# Patient Record
Sex: FEMALE | Race: BLACK OR AFRICAN AMERICAN | NOT HISPANIC OR LATINO | Employment: FULL TIME | URBAN - METROPOLITAN AREA
[De-identification: names, ages, dates, MRNs, and addresses within clinical notes are randomized per-mention and may not be internally consistent; named-entity substitution may affect disease eponyms.]

---

## 2017-04-10 ENCOUNTER — TELEPHONE (OUTPATIENT)
Dept: ORTHOPEDICS | Facility: CLINIC | Age: 47
End: 2017-04-10

## 2017-04-10 NOTE — TELEPHONE ENCOUNTER
----- Message from Moe Iqbal sent at 4/10/2017  8:46 AM CDT -----  Contact: self  Patient ask if can be seen sooner than 4/24 appointment due to in pain.

## 2017-04-24 ENCOUNTER — OFFICE VISIT (OUTPATIENT)
Dept: ORTHOPEDICS | Facility: CLINIC | Age: 47
End: 2017-04-24
Payer: COMMERCIAL

## 2017-04-24 VITALS — BODY MASS INDEX: 44.43 KG/M2 | WEIGHT: 283.06 LBS | HEIGHT: 67 IN

## 2017-04-24 DIAGNOSIS — M72.2 PLANTAR FASCIITIS, BILATERAL: Primary | ICD-10-CM

## 2017-04-24 PROCEDURE — 20551 NJX 1 TENDON ORIGIN/INSJ: CPT | Mod: LT,S$GLB,, | Performed by: ORTHOPAEDIC SURGERY

## 2017-04-24 PROCEDURE — 99213 OFFICE O/P EST LOW 20 MIN: CPT | Mod: 25,S$GLB,, | Performed by: ORTHOPAEDIC SURGERY

## 2017-04-24 PROCEDURE — 99999 PR PBB SHADOW E&M-EST. PATIENT-LVL II: CPT | Mod: PBBFAC,,, | Performed by: ORTHOPAEDIC SURGERY

## 2017-04-25 RX ORDER — METHYLPREDNISOLONE ACETATE 80 MG/ML
80 INJECTION, SUSPENSION INTRA-ARTICULAR; INTRALESIONAL; INTRAMUSCULAR; SOFT TISSUE
Status: COMPLETED | OUTPATIENT
Start: 2017-04-25 | End: 2017-04-25

## 2017-04-25 RX ADMIN — METHYLPREDNISOLONE ACETATE 80 MG: 80 INJECTION, SUSPENSION INTRA-ARTICULAR; INTRALESIONAL; INTRAMUSCULAR; SOFT TISSUE at 07:04

## 2017-04-26 ENCOUNTER — TELEPHONE (OUTPATIENT)
Dept: ORTHOPEDICS | Facility: CLINIC | Age: 47
End: 2017-04-26

## 2017-04-26 NOTE — TELEPHONE ENCOUNTER
----- Message from Roger Perkins sent at 4/26/2017  9:14 AM CDT -----  Contact: self/home  Pt would like to speak with you regarding an excuse for work due to the pain she is currently experiencing. Pt would also like a rx for pain medication.

## 2017-04-26 NOTE — PROGRESS NOTES
Ms. Martínez returns today.  This is a 46-year-old female with bilateral plantar   fasciitis.  I saw her last about four months ago, at which time her right foot   was most symptomatic, and I performed an injection in addition to advising her   on heel cord stretching and wearing supportive shoes.  She returns today for a   followup and reports her right foot is doing well, but her left foot has flared   up more and she would like get an injection on the left side.    Examination reveals tenderness over the medial tuberosity of her calcaneus   consistent with plantar fasciitis.  She has minimal tenderness on the right   side.  Her heel cords are still mildly tight.    After verbal consent and sterile prep, I injected the origin of her left plantar   fascia with 2 mL of lidocaine and 40 mg of Depo-Medrol.  I am going to have her   return to see me as needed.      LV/OSWALD  dd: 04/25/2017 07:07:19 (CDT)  td: 04/25/2017 20:15:22 (AUGIE)  Doc ID   #[id number]  Job ID #[id number]    CC:

## 2017-04-26 NOTE — TELEPHONE ENCOUNTER
Returned call. Told patient  can not give pain medication unless it is for surgery. Instructed her to take ibuprofen and ice the injection area. Placed work excuse note from  at  for pickup.

## 2017-06-12 ENCOUNTER — OFFICE VISIT (OUTPATIENT)
Dept: INTERNAL MEDICINE | Facility: CLINIC | Age: 47
End: 2017-06-12
Payer: COMMERCIAL

## 2017-06-12 VITALS
DIASTOLIC BLOOD PRESSURE: 82 MMHG | WEIGHT: 288.69 LBS | BODY MASS INDEX: 45.31 KG/M2 | OXYGEN SATURATION: 99 % | SYSTOLIC BLOOD PRESSURE: 120 MMHG | HEIGHT: 67 IN | HEART RATE: 80 BPM

## 2017-06-12 DIAGNOSIS — M72.2 PLANTAR FASCIITIS: ICD-10-CM

## 2017-06-12 DIAGNOSIS — I10 ESSENTIAL HYPERTENSION: ICD-10-CM

## 2017-06-12 DIAGNOSIS — Z13.220 SCREENING, LIPID: ICD-10-CM

## 2017-06-12 DIAGNOSIS — Z13.21 ENCOUNTER FOR VITAMIN DEFICIENCY SCREENING: ICD-10-CM

## 2017-06-12 DIAGNOSIS — K63.5 POLYP OF COLON, UNSPECIFIED PART OF COLON, UNSPECIFIED TYPE: ICD-10-CM

## 2017-06-12 DIAGNOSIS — Z98.890 S/P COLONOSCOPY: ICD-10-CM

## 2017-06-12 DIAGNOSIS — R73.03 PREDIABETES: ICD-10-CM

## 2017-06-12 DIAGNOSIS — Z80.0 FAMILY HISTORY OF COLON CANCER: ICD-10-CM

## 2017-06-12 DIAGNOSIS — Z11.3 SCREENING FOR STD (SEXUALLY TRANSMITTED DISEASE): ICD-10-CM

## 2017-06-12 DIAGNOSIS — G47.33 OSA (OBSTRUCTIVE SLEEP APNEA): ICD-10-CM

## 2017-06-12 DIAGNOSIS — Z00.00 ANNUAL PHYSICAL EXAM: Primary | ICD-10-CM

## 2017-06-12 LAB
BILIRUB UR QL STRIP: NEGATIVE
CLARITY UR REFRACT.AUTO: CLEAR
COLOR UR AUTO: YELLOW
GLUCOSE UR QL STRIP: NEGATIVE
HGB UR QL STRIP: NEGATIVE
KETONES UR QL STRIP: NEGATIVE
LEUKOCYTE ESTERASE UR QL STRIP: NEGATIVE
NITRITE UR QL STRIP: NEGATIVE
PH UR STRIP: 5 [PH] (ref 5–8)
PROT UR QL STRIP: NEGATIVE
SP GR UR STRIP: 1.02 (ref 1–1.03)
URN SPEC COLLECT METH UR: NORMAL
UROBILINOGEN UR STRIP-ACNC: NEGATIVE EU/DL

## 2017-06-12 PROCEDURE — 99396 PREV VISIT EST AGE 40-64: CPT | Mod: S$GLB,,, | Performed by: FAMILY MEDICINE

## 2017-06-12 PROCEDURE — 87591 N.GONORRHOEAE DNA AMP PROB: CPT

## 2017-06-12 PROCEDURE — 99999 PR PBB SHADOW E&M-EST. PATIENT-LVL V: CPT | Mod: PBBFAC,,, | Performed by: FAMILY MEDICINE

## 2017-06-12 PROCEDURE — 81003 URINALYSIS AUTO W/O SCOPE: CPT

## 2017-06-12 RX ORDER — TRIAMTERENE/HYDROCHLOROTHIAZID 37.5-25 MG
1 TABLET ORAL DAILY
Qty: 90 TABLET | Refills: 1 | Status: SHIPPED | OUTPATIENT
Start: 2017-06-12 | End: 2018-02-23 | Stop reason: SDUPTHER

## 2017-06-12 RX ORDER — MELOXICAM 15 MG/1
15 TABLET ORAL DAILY
Qty: 30 TABLET | Refills: 1 | Status: SHIPPED | OUTPATIENT
Start: 2017-06-12 | End: 2017-10-30 | Stop reason: CLARIF

## 2017-06-12 NOTE — PROGRESS NOTES
"Subjective:       Patient ID: Dina Martínez is a 46 y.o. female.    Chief Complaint: Establish Care    HPI 45 y/o HTN, BILL has home CPAP, and family hx of colon cancer is here to establish  Care.  Denies f/n/v/d/constipation, colonoscopy in 10/2014, showed colon polyps, due in 10/2019, has plantar fascitis, has had steroid shots and is taking aleve without relief, has inserts from good feet but never seen by podiatry, denies cp/sob/urinary sx. She does cardio 2 days a week for 45 min. Sleeping ok. Mood is a little agitated. Eating fair, trying to eat healthy.  HTN: has been on Maxide for about 8 months when she was diagnoses  Eye exam due  Dental utd  Vaccines: not sure  GYN: mmg due in October  OTC: prilosec about 1 time a week, Aleve daily or most days    Review of Systems   Constitutional: Negative for activity change, appetite change, fatigue and fever.   Respiratory: Negative for cough and shortness of breath.    Cardiovascular: Negative for chest pain, palpitations and leg swelling.   Gastrointestinal: Negative for constipation, diarrhea, nausea and vomiting.   Genitourinary: Negative for difficulty urinating and dysuria.   Skin: Negative for rash.   Neurological: Negative for dizziness and light-headedness.   Psychiatric/Behavioral: Negative for sleep disturbance.       Objective:      /82   Pulse 80   Ht 5' 7" (1.702 m)   Wt 131 kg (288 lb 11.1 oz)   LMP 10/25/2015   SpO2 99%   BMI 45.22 kg/m²   Physical Exam   Constitutional: She appears well-developed and well-nourished.   HENT:   Head: Normocephalic and atraumatic.   Mouth/Throat: No oropharyngeal exudate.   Neck: Normal range of motion. Neck supple. No thyromegaly present.   Cardiovascular: Normal rate, regular rhythm and normal heart sounds.    Pulmonary/Chest: Effort normal and breath sounds normal. No respiratory distress.   Musculoskeletal: She exhibits no edema.   Lymphadenopathy:     She has no cervical adenopathy.   Neurological: " She is alert.   Skin: Skin is warm and dry.   Psychiatric: She has a normal mood and affect.   Nursing note and vitals reviewed.      Assessment:       1. Annual physical exam    2. BILL (obstructive sleep apnea)    3. Family history of colon cancer    4. S/P colonoscopy    5. Polyp of colon, unspecified part of colon, unspecified type    6. Plantar fasciitis    7. Essential hypertension    8. Prediabetes    9. Screening, lipid    10. Encounter for vitamin deficiency screening    11. Screening for STD (sexually transmitted disease)        Plan:   Dina Robbins was seen today for establish care.    Diagnoses and all orders for this visit:    Annual physical exam  -     Hemoglobin A1c; Future  -     CBC auto differential; Future  -     Comprehensive metabolic panel; Future  -     Lipid panel; Future  -     TSH; Future  -     Vitamin D; Future  -     Urinalysis    BILL (obstructive sleep apnea)  -     Ambulatory consult for Sleep Study    Family history of colon cancer    S/P colonoscopy    Polyp of colon, unspecified part of colon, unspecified type    Plantar fasciitis  -     Ambulatory Referral to Podiatry  -     meloxicam (MOBIC) 15 MG tablet; Take 1 tablet (15 mg total) by mouth once daily.    Essential hypertension  -     Ambulatory Referral to Optometry  -     CBC auto differential; Future  -     Comprehensive metabolic panel; Future  -     Lipid panel; Future  -     TSH; Future  -     Urinalysis  -     triamterene-hydrochlorothiazide 37.5-25 mg (MAXZIDE-25) 37.5-25 mg per tablet; Take 1 tablet by mouth once daily.    Prediabetes  -     Hemoglobin A1c; Future  -     Lipid panel; Future    Screening, lipid  -     Lipid panel; Future    Encounter for vitamin deficiency screening  -     Vitamin D; Future    Screening for STD (sexually transmitted disease)  -     C. trachomatis/N. gonorrhoeae by AMP DNA Urine  -     Hepatitis panel, acute; Future  -     HIV-1 and HIV-2 antibodies; Future  -     RPR; Future

## 2017-06-13 ENCOUNTER — LAB VISIT (OUTPATIENT)
Dept: LAB | Facility: HOSPITAL | Age: 47
End: 2017-06-13
Attending: FAMILY MEDICINE
Payer: COMMERCIAL

## 2017-06-13 ENCOUNTER — TELEPHONE (OUTPATIENT)
Dept: INTERNAL MEDICINE | Facility: CLINIC | Age: 47
End: 2017-06-13

## 2017-06-13 DIAGNOSIS — Z00.00 ANNUAL PHYSICAL EXAM: ICD-10-CM

## 2017-06-13 DIAGNOSIS — R73.03 PREDIABETES: ICD-10-CM

## 2017-06-13 DIAGNOSIS — Z13.220 SCREENING, LIPID: ICD-10-CM

## 2017-06-13 DIAGNOSIS — E55.9 VITAMIN D DEFICIENCY: ICD-10-CM

## 2017-06-13 DIAGNOSIS — Z13.21 ENCOUNTER FOR VITAMIN DEFICIENCY SCREENING: ICD-10-CM

## 2017-06-13 DIAGNOSIS — I10 ESSENTIAL HYPERTENSION: ICD-10-CM

## 2017-06-13 DIAGNOSIS — Z11.3 SCREENING FOR STD (SEXUALLY TRANSMITTED DISEASE): ICD-10-CM

## 2017-06-13 LAB
25(OH)D3+25(OH)D2 SERPL-MCNC: 25 NG/ML
ALBUMIN SERPL BCP-MCNC: 3.3 G/DL
ALP SERPL-CCNC: 79 U/L
ALT SERPL W/O P-5'-P-CCNC: 14 U/L
ANION GAP SERPL CALC-SCNC: 10 MMOL/L
AST SERPL-CCNC: 13 U/L
BASOPHILS # BLD AUTO: 0.02 K/UL
BASOPHILS NFR BLD: 0.4 %
BILIRUB SERPL-MCNC: 0.2 MG/DL
BUN SERPL-MCNC: 19 MG/DL
C TRACH DNA SPEC QL NAA+PROBE: NOT DETECTED
CALCIUM SERPL-MCNC: 9.2 MG/DL
CHLORIDE SERPL-SCNC: 108 MMOL/L
CHOLEST/HDLC SERPL: 3 {RATIO}
CO2 SERPL-SCNC: 25 MMOL/L
CREAT SERPL-MCNC: 0.8 MG/DL
DIFFERENTIAL METHOD: ABNORMAL
EOSINOPHIL # BLD AUTO: 0.1 K/UL
EOSINOPHIL NFR BLD: 1.3 %
ERYTHROCYTE [DISTWIDTH] IN BLOOD BY AUTOMATED COUNT: 15.2 %
EST. GFR  (AFRICAN AMERICAN): >60 ML/MIN/1.73 M^2
EST. GFR  (NON AFRICAN AMERICAN): >60 ML/MIN/1.73 M^2
ESTIMATED AVG GLUCOSE: 134 MG/DL
GLUCOSE SERPL-MCNC: 102 MG/DL
HAV IGM SERPL QL IA: NEGATIVE
HBA1C MFR BLD HPLC: 6.3 %
HBV CORE IGM SERPL QL IA: NEGATIVE
HBV SURFACE AG SERPL QL IA: NEGATIVE
HCT VFR BLD AUTO: 37 %
HCV AB SERPL QL IA: NEGATIVE
HDL/CHOLESTEROL RATIO: 32.9 %
HDLC SERPL-MCNC: 158 MG/DL
HDLC SERPL-MCNC: 52 MG/DL
HGB BLD-MCNC: 11.7 G/DL
HIV 1+2 AB+HIV1 P24 AG SERPL QL IA: NEGATIVE
LDLC SERPL CALC-MCNC: 93.8 MG/DL
LYMPHOCYTES # BLD AUTO: 2 K/UL
LYMPHOCYTES NFR BLD: 38.8 %
MCH RBC QN AUTO: 26.1 PG
MCHC RBC AUTO-ENTMCNC: 31.6 %
MCV RBC AUTO: 82 FL
MONOCYTES # BLD AUTO: 0.2 K/UL
MONOCYTES NFR BLD: 4.2 %
N GONORRHOEA DNA SPEC QL NAA+PROBE: NOT DETECTED
NEUTROPHILS # BLD AUTO: 2.9 K/UL
NEUTROPHILS NFR BLD: 55.1 %
NONHDLC SERPL-MCNC: 106 MG/DL
PLATELET # BLD AUTO: 239 K/UL
PMV BLD AUTO: 10.1 FL
POTASSIUM SERPL-SCNC: 3.9 MMOL/L
PROT SERPL-MCNC: 7 G/DL
RBC # BLD AUTO: 4.49 M/UL
RPR SER QL: NORMAL
SODIUM SERPL-SCNC: 143 MMOL/L
TRIGL SERPL-MCNC: 61 MG/DL
TSH SERPL DL<=0.005 MIU/L-ACNC: 1.34 UIU/ML
WBC # BLD AUTO: 5.21 K/UL

## 2017-06-13 PROCEDURE — 86703 HIV-1/HIV-2 1 RESULT ANTBDY: CPT

## 2017-06-13 PROCEDURE — 80074 ACUTE HEPATITIS PANEL: CPT

## 2017-06-13 PROCEDURE — 82306 VITAMIN D 25 HYDROXY: CPT

## 2017-06-13 PROCEDURE — 80053 COMPREHEN METABOLIC PANEL: CPT

## 2017-06-13 PROCEDURE — 84443 ASSAY THYROID STIM HORMONE: CPT

## 2017-06-13 PROCEDURE — 85025 COMPLETE CBC W/AUTO DIFF WBC: CPT

## 2017-06-13 PROCEDURE — 86592 SYPHILIS TEST NON-TREP QUAL: CPT

## 2017-06-13 PROCEDURE — 36415 COLL VENOUS BLD VENIPUNCTURE: CPT

## 2017-06-13 PROCEDURE — 80061 LIPID PANEL: CPT

## 2017-06-13 PROCEDURE — 83036 HEMOGLOBIN GLYCOSYLATED A1C: CPT

## 2017-06-21 ENCOUNTER — TELEPHONE (OUTPATIENT)
Dept: INTERNAL MEDICINE | Facility: CLINIC | Age: 47
End: 2017-06-21

## 2017-06-21 DIAGNOSIS — R73.03 PREDIABETES: Primary | ICD-10-CM

## 2017-06-21 RX ORDER — METFORMIN HYDROCHLORIDE 500 MG/1
500 TABLET ORAL 2 TIMES DAILY WITH MEALS
Qty: 180 TABLET | Refills: 3 | Status: SHIPPED | OUTPATIENT
Start: 2017-06-21 | End: 2018-06-13

## 2017-06-22 ENCOUNTER — OFFICE VISIT (OUTPATIENT)
Dept: PODIATRY | Facility: CLINIC | Age: 47
End: 2017-06-22
Payer: COMMERCIAL

## 2017-06-22 ENCOUNTER — OFFICE VISIT (OUTPATIENT)
Dept: OPTOMETRY | Facility: CLINIC | Age: 47
End: 2017-06-22
Payer: COMMERCIAL

## 2017-06-22 VITALS
DIASTOLIC BLOOD PRESSURE: 94 MMHG | BODY MASS INDEX: 43.79 KG/M2 | HEART RATE: 83 BPM | WEIGHT: 279 LBS | SYSTOLIC BLOOD PRESSURE: 140 MMHG | HEIGHT: 67 IN

## 2017-06-22 DIAGNOSIS — H52.4 PRESBYOPIA: ICD-10-CM

## 2017-06-22 DIAGNOSIS — G89.29 CHRONIC HEEL PAIN, LEFT: ICD-10-CM

## 2017-06-22 DIAGNOSIS — M21.41 PES PLANUS OF BOTH FEET: ICD-10-CM

## 2017-06-22 DIAGNOSIS — E11.9 TYPE 2 DIABETES MELLITUS WITHOUT OPHTHALMIC MANIFESTATIONS: Primary | ICD-10-CM

## 2017-06-22 DIAGNOSIS — M79.672 CHRONIC HEEL PAIN, LEFT: ICD-10-CM

## 2017-06-22 DIAGNOSIS — M21.42 PES PLANUS OF BOTH FEET: ICD-10-CM

## 2017-06-22 DIAGNOSIS — M72.2 PLANTAR FASCIITIS: Primary | ICD-10-CM

## 2017-06-22 DIAGNOSIS — M24.573 EQUINUS CONTRACTURE OF ANKLE: ICD-10-CM

## 2017-06-22 PROCEDURE — 20550 NJX 1 TENDON SHEATH/LIGAMENT: CPT | Mod: RT,S$GLB,, | Performed by: PODIATRIST

## 2017-06-22 PROCEDURE — 92004 COMPRE OPH EXAM NEW PT 1/>: CPT | Mod: S$GLB,,, | Performed by: OPTOMETRIST

## 2017-06-22 PROCEDURE — 99999 PR PBB SHADOW E&M-EST. PATIENT-LVL II: CPT | Mod: PBBFAC,,, | Performed by: OPTOMETRIST

## 2017-06-22 PROCEDURE — 99203 OFFICE O/P NEW LOW 30 MIN: CPT | Mod: 25,S$GLB,, | Performed by: PODIATRIST

## 2017-06-22 PROCEDURE — 99999 PR PBB SHADOW E&M-EST. PATIENT-LVL III: CPT | Mod: PBBFAC,,, | Performed by: PODIATRIST

## 2017-06-22 RX ORDER — TRIAMCINOLONE ACETONIDE 40 MG/ML
20 INJECTION, SUSPENSION INTRA-ARTICULAR; INTRAMUSCULAR ONCE
Status: COMPLETED | OUTPATIENT
Start: 2017-06-22 | End: 2017-06-22

## 2017-06-22 RX ORDER — DEXAMETHASONE SODIUM PHOSPHATE 4 MG/ML
2 INJECTION, SOLUTION INTRA-ARTICULAR; INTRALESIONAL; INTRAMUSCULAR; INTRAVENOUS; SOFT TISSUE ONCE
Status: COMPLETED | OUTPATIENT
Start: 2017-06-22 | End: 2017-06-22

## 2017-06-22 RX ORDER — DICLOFENAC SODIUM 10 MG/G
2 GEL TOPICAL 4 TIMES DAILY
Qty: 1 TUBE | Refills: 4 | Status: SHIPPED | OUTPATIENT
Start: 2017-06-22 | End: 2018-06-13

## 2017-06-22 RX ADMIN — TRIAMCINOLONE ACETONIDE 20 MG: 40 INJECTION, SUSPENSION INTRA-ARTICULAR; INTRAMUSCULAR at 07:06

## 2017-06-22 RX ADMIN — DEXAMETHASONE SODIUM PHOSPHATE 2 MG: 4 INJECTION, SOLUTION INTRA-ARTICULAR; INTRALESIONAL; INTRAMUSCULAR; INTRAVENOUS; SOFT TISSUE at 07:06

## 2017-06-22 NOTE — LETTER
June 22, 2017      Richelle Hong MD  [address]  [place] [address]           [facility] - Podiatry  [address]  [place] [address]  Phone: [phone]          Patient: Dina Martínez   MR Number: [id number]   YOB: 1970   Date of Visit: 6/22/2017       Dear Dr. Richelle Hong:    Thank you for referring Dina Martínez to me for evaluation. Attached you will find relevant portions of my assessment and plan of care.    If you have questions, please do not hesitate to call me. I look forward to following Dina Martínez along with you.    Sincerely,    Priya Little DPM    Enclosure  CC:  No Recipients    If you would like to receive this communication electronically, please contact [email or web address] or [phone] to request more information on [email or web address] Link access.    For providers and/or their staff who would like to refer a patient to [facility], please contact us through our one-stop-shop provider referral line, [facility], at [phone].    If you feel you have received this communication in error or would no longer like to receive these types of communications, please e-mail [email or web address]

## 2017-06-22 NOTE — LETTER
June 22, 2017      Richelle Hong MD  [address]  [facility] [address]           [facility] - Optometry  [address]  [facility] [address]  Phone: [phone]  Fax: [phone]          Patient: Dina Martínez   MR Number: [id number]   YOB: 1970   Date of Visit: 6/22/2017       Dear Dr. Richelle Hong:    Thank you for referring Dina Martínez to me for evaluation. Attached you will find relevant portions of my assessment and plan of care.    If you have questions, please do not hesitate to call me. I look forward to following Dina Martínez along with you.    Sincerely,    Lula Wolf, OD    Enclosure  CC:  No Recipients    If you would like to receive this communication electronically, please contact [email or web address] or [phone] to request more information on [email or web address] Link access.    For providers and/or their staff who would like to refer a patient to [facility], please contact us through our one-stop-shop provider referral line, [facility], at [phone].    If you feel you have received this communication in error or would no longer like to receive these types of communications, please e-mail [email or web address]

## 2017-06-22 NOTE — PROGRESS NOTES
Subjective:      Patient ID: Dina Martínez is a 46 y.o. female.    Chief Complaint: Foot Pain (lt foot)    Dina Robbins is a 46 y.o. female who presents to the clinic complaining of heel pain in the left foot, especially with the first step in the morning. The pain is described as Tight, Deep and Sharp. The onset of the pain was gradual and has worsened over the past several months. Dina Robbins rates the pain as 8/10. She denies a history of trauma. Prior treatments include steroid injection per Orthopedics, stretching but has not done this regularly, Good Feet orthotics which hurt. Inquiring about further treatment options.     Past Medical History:   Diagnosis Date    Hypertension        Past Surgical History:   Procedure Laterality Date    ENDOMETRIAL ABLATION      HYSTERECTOMY  11/12/2015    fibroids    HYSTERECTOMY         Family History   Problem Relation Age of Onset    Colon cancer Father 76    Diabetes Neg Hx     Heart disease Neg Hx        Social History     Social History    Marital status:      Spouse name: N/A    Number of children: N/A    Years of education: N/A     Occupational History    security      Social History Main Topics    Smoking status: Never Smoker    Smokeless tobacco: None    Alcohol use 0.6 oz/week     1 Cans of beer per week      Comment: social    Drug use: No    Sexual activity: No     Other Topics Concern    None     Social History Narrative    None       Current Outpatient Prescriptions   Medication Sig Dispense Refill    diclofenac sodium 1 % Gel Apply 2 g topically 4 (four) times daily. Apply topically up to 4 times daily for left heel pain. 1 Tube 4    meloxicam (MOBIC) 15 MG tablet Take 1 tablet (15 mg total) by mouth once daily. 30 tablet 1    metformin (GLUCOPHAGE) 500 MG tablet Take 1 tablet (500 mg total) by mouth 2 (two) times daily with meals. 180 tablet 3    multivitamin capsule Take 1 capsule by mouth once daily.       triamterene-hydrochlorothiazide 37.5-25 mg (MAXZIDE-25) 37.5-25 mg per tablet Take 1 tablet by mouth once daily. 90 tablet 1     No current facility-administered medications for this visit.        Review of patient's allergies indicates:  No Known Allergies      Review of Systems   Constitution: Negative for chills and fever.   Cardiovascular: Negative for chest pain, claudication and leg swelling.   Respiratory: Negative for cough and shortness of breath.    Musculoskeletal: Positive for stiffness.        Heel pain left   Gastrointestinal: Negative for nausea and vomiting.   Neurological: Negative for numbness and paresthesias.   Psychiatric/Behavioral: Negative for altered mental status.           Objective:      Physical Exam   Constitutional: She is oriented to person, place, and time. She appears well-developed and well-nourished.   HENT:   Head: Normocephalic.   Cardiovascular: Intact distal pulses.    Pulses:       Dorsalis pedis pulses are 2+ on the right side, and 2+ on the left side.        Posterior tibial pulses are 2+ on the right side, and 2+ on the left side.   CRT < 3 sec to tips of toes. No edema noted to b/l LE. No vericosities noted to b/l LEs.      Pulmonary/Chest: No respiratory distress.   Musculoskeletal:   Gastrocnemius equinus noted to b/l ankles with decreased DF noted on exam. MMT 5/5 in DF/PF/Inv/Ev resistance with no reproduction of pain in any direction. Passive range of motion of ankle and pedal joints is painless. Adequate pedal joint ROM.     Moderate pain with palpation of plantar medial, central and lateral calcaneal tuber left. No pain with lateral calc squeeze. No pain with STJ ROM or palpation of Achilles tendon/insertion, Abductor hallucis muscle belly, TA tendon/insertion left.     Hypermobility noted to 1st ray b/l with near complete collapse of medial longitudinal arch b/l with loading. Pes planus foot type b/l.    Neurological: She is alert and oriented to person, place,  and time. She has normal strength. No sensory deficit.   Light touch, proprioception, and sharp/dull sensation are all intact bilaterally. Protective threshold with the Simonton-Wienstein monofilament is intact bilaterally.    Skin: Skin is warm, dry and intact. No erythema.   No open lesions, lacerations or wounds noted. Nails are normotrophic to R 1-5 and L 1-5. Interdigital spaces clean, dry and intact b/l. No erythema noted to b/l foot. Skin texture normal. Pedal hair adequate. Skin temperature normal b/l foot.      Psychiatric: She has a normal mood and affect. Her behavior is normal. Judgment and thought content normal.   Vitals reviewed.            Assessment:       Encounter Diagnoses   Name Primary?    Plantar fasciitis Yes    Chronic heel pain, left     Equinus contracture of ankle     Pes planus of both feet          Plan:       Dina Robbins was seen today for foot pain.    Diagnoses and all orders for this visit:    Plantar fasciitis    Chronic heel pain, left    Equinus contracture of ankle    Pes planus of both feet    Other orders  -     triamcinolone acetonide injection 20 mg; Inject 0.5 mLs (20 mg total) into the muscle once.  -     dexamethasone injection 2 mg; 0.5 mLs (2 mg total) by Other route once.  -     diclofenac sodium 1 % Gel; Apply 2 g topically 4 (four) times daily. Apply topically up to 4 times daily for left heel pain.      I counseled the patient on her conditions, their implications and medical management.    Patient given information on OTC Spenco orthotics, stretching, icing with associated handouts. Current Good Feet orthotics are excessively narrow for her feet and she is walking on the outside lateral edge of the insert which may be contributing to more pain. Recommend Spenco as these are wider.     Rx Voltaren gel to be applied to affected area up to 4-5 x daily as needed for pain.      Patient given the option of steroid injection directly into the heel. She opted for injection  today. Last injection was > 2 months ago.     Counseled the patient on the potential complications of local injection of corticosteroid including, but not limited to: Discoloration of skin, erosion of soft tissue, and increased likelihood of rupture of a soft tissue structure (ie. Plantar fascia, muscle, tendon, ligament, or capsule in the area of injection). Patient indicates understanding of my explanation, that any questions have been answered to patient satisfaction, and patient gives verbal consent for injection of affected area.    With patients permission and after sterilizing the area with an alcohol prep, the affected area was injected with a  solution containing 0.5 ml of 2% Lidocaine plain + 0.5ml 0.5% marcaine plain + 0.5ml Kenalog (20mg) + 0.5ml of Dexamethasone (2mg) injected into the R heel medial approach.  The patient tolerated the injection well and reported comfort to the area immediately after injection.    Advised to stretch calf muscles multiple times daily for increased flexibility of ankles and to help decrease forefoot pressure over time. Illustrative stretching exercise handout provided.     Use above treatment options as combination over next several weeks and return in 4-6 weeks for reassessment, call sooner PRN. Will consider PT if no improvement after sufficient time.

## 2017-06-22 NOTE — PROGRESS NOTES
HPI     Diabetic Eye Exam    Additional comments: pre diabetic           Comments   Patient's last dilated exam was: never  Pt states:trouble reading small print, having to hold her books or phone   further out. Patient denies flashes, floaters, pain and double vision. Not   using any gtts, no other ocular complaints     Hemoglobin A1C       Date                     Value               Ref Range             Status                06/13/2017               6.3 (H)             4.0 - 5.6 %           Final                 06/08/2016               6.0                 4.5 - 6.2 %           Final                 08/21/2014               5.9                 4.5 - 6.2 %           Final            ----------           Last edited by Kamille Blackwell, PCT on 6/22/2017  8:10 AM.   (History)        ROS     Negative for: Constitutional, Gastrointestinal, Neurological, Skin,   Genitourinary, Musculoskeletal, HENT, Endocrine, Cardiovascular, Eyes,   Respiratory, Psychiatric, Allergic/Imm, Heme/Lymph    Last edited by Lula Wolf, OD on 6/22/2017 12:21 PM. (History)        Assessment /Plan     For exam results, see Encounter Report.    Type 2 diabetes mellitus without ophthalmic manifestations    Presbyopia          1.  No retinopathy--monitor yearly.  Educated pt eye health correlates with blood sugar control.    2.  No rx given.  Recommend +1.00 OTC readers.        RTC 1 year for dm exam.

## 2017-06-28 ENCOUNTER — PATIENT MESSAGE (OUTPATIENT)
Dept: INTERNAL MEDICINE | Facility: CLINIC | Age: 47
End: 2017-06-28

## 2017-10-29 ENCOUNTER — HOSPITAL ENCOUNTER (EMERGENCY)
Facility: HOSPITAL | Age: 47
Discharge: HOME | End: 2017-10-30
Attending: EMERGENCY MEDICINE
Payer: COMMERCIAL

## 2017-10-29 DIAGNOSIS — R07.9 CHEST PAIN: ICD-10-CM

## 2017-10-29 PROCEDURE — 99284 EMERGENCY DEPT VISIT MOD MDM: CPT

## 2017-10-29 PROCEDURE — 93005 ELECTROCARDIOGRAM TRACING: CPT

## 2017-10-29 PROCEDURE — 93010 ELECTROCARDIOGRAM REPORT: CPT | Mod: ,,, | Performed by: INTERNAL MEDICINE

## 2017-10-29 RX ORDER — ASPIRIN 325 MG
325 TABLET ORAL
Status: COMPLETED | OUTPATIENT
Start: 2017-10-29 | End: 2017-10-30

## 2017-10-30 VITALS
HEIGHT: 67 IN | DIASTOLIC BLOOD PRESSURE: 77 MMHG | SYSTOLIC BLOOD PRESSURE: 137 MMHG | RESPIRATION RATE: 12 BRPM | BODY MASS INDEX: 43.95 KG/M2 | OXYGEN SATURATION: 99 % | WEIGHT: 280 LBS | TEMPERATURE: 99 F | HEART RATE: 82 BPM

## 2017-10-30 LAB
ALBUMIN SERPL BCP-MCNC: 3.5 G/DL
ALP SERPL-CCNC: 84 U/L
ALT SERPL W/O P-5'-P-CCNC: 19 U/L
ANION GAP SERPL CALC-SCNC: 9 MMOL/L
AST SERPL-CCNC: 19 U/L
BASOPHILS # BLD AUTO: 0.01 K/UL
BASOPHILS NFR BLD: 0.1 %
BILIRUB SERPL-MCNC: 0.2 MG/DL
BNP SERPL-MCNC: 37 PG/ML
BUN SERPL-MCNC: 15 MG/DL
CALCIUM SERPL-MCNC: 9.4 MG/DL
CHLORIDE SERPL-SCNC: 106 MMOL/L
CO2 SERPL-SCNC: 25 MMOL/L
CREAT SERPL-MCNC: 0.8 MG/DL
D DIMER PPP IA.FEU-MCNC: 0.2 MG/L FEU
DIFFERENTIAL METHOD: ABNORMAL
EOSINOPHIL # BLD AUTO: 0.1 K/UL
EOSINOPHIL NFR BLD: 1.4 %
ERYTHROCYTE [DISTWIDTH] IN BLOOD BY AUTOMATED COUNT: 15.3 %
EST. GFR  (AFRICAN AMERICAN): >60 ML/MIN/1.73 M^2
EST. GFR  (NON AFRICAN AMERICAN): >60 ML/MIN/1.73 M^2
GLUCOSE SERPL-MCNC: 133 MG/DL
HCT VFR BLD AUTO: 37.1 %
HGB BLD-MCNC: 11.9 G/DL
LYMPHOCYTES # BLD AUTO: 2.6 K/UL
LYMPHOCYTES NFR BLD: 33.2 %
MCH RBC QN AUTO: 25.9 PG
MCHC RBC AUTO-ENTMCNC: 32.1 G/DL
MCV RBC AUTO: 81 FL
MONOCYTES # BLD AUTO: 0.5 K/UL
MONOCYTES NFR BLD: 6.4 %
NEUTROPHILS # BLD AUTO: 4.6 K/UL
NEUTROPHILS NFR BLD: 58.6 %
PLATELET # BLD AUTO: 262 K/UL
PMV BLD AUTO: 9.8 FL
POTASSIUM SERPL-SCNC: 4.2 MMOL/L
PROT SERPL-MCNC: 7.4 G/DL
RBC # BLD AUTO: 4.59 M/UL
SODIUM SERPL-SCNC: 140 MMOL/L
TROPONIN I SERPL DL<=0.01 NG/ML-MCNC: <0.006 NG/ML
TROPONIN I SERPL DL<=0.01 NG/ML-MCNC: <0.006 NG/ML
WBC # BLD AUTO: 7.81 K/UL

## 2017-10-30 PROCEDURE — 25000003 PHARM REV CODE 250: Performed by: EMERGENCY MEDICINE

## 2017-10-30 PROCEDURE — 85379 FIBRIN DEGRADATION QUANT: CPT

## 2017-10-30 PROCEDURE — 93005 ELECTROCARDIOGRAM TRACING: CPT

## 2017-10-30 PROCEDURE — 83880 ASSAY OF NATRIURETIC PEPTIDE: CPT

## 2017-10-30 PROCEDURE — 93010 ELECTROCARDIOGRAM REPORT: CPT | Mod: ,,, | Performed by: INTERNAL MEDICINE

## 2017-10-30 PROCEDURE — 84484 ASSAY OF TROPONIN QUANT: CPT | Mod: 91

## 2017-10-30 PROCEDURE — 80053 COMPREHEN METABOLIC PANEL: CPT

## 2017-10-30 PROCEDURE — 85025 COMPLETE CBC W/AUTO DIFF WBC: CPT

## 2017-10-30 PROCEDURE — 84484 ASSAY OF TROPONIN QUANT: CPT

## 2017-10-30 RX ORDER — ESOMEPRAZOLE MAGNESIUM 40 MG/1
40 CAPSULE, DELAYED RELEASE ORAL DAILY
Qty: 30 CAPSULE | Refills: 0 | Status: SHIPPED | OUTPATIENT
Start: 2017-10-30 | End: 2018-06-13

## 2017-10-30 RX ADMIN — LIDOCAINE HYDROCHLORIDE: 20 SOLUTION ORAL; TOPICAL at 12:10

## 2017-10-30 RX ADMIN — ASPIRIN 325 MG ORAL TABLET 325 MG: 325 PILL ORAL at 12:10

## 2017-10-30 NOTE — ED NOTES
To ER with c/o midsternal chest pain starting yesterday while watching football on TV  Pt states that pain has not increased but will not go away.  resp even and unlabored.  Lungs clear.  abd soft, bowel sounds positive to all quadrants.  Pt states that pain is 8/10 prior to GI cocktail and then 4/10 after medication taken.

## 2017-10-30 NOTE — ED PROVIDER NOTES
Encounter Date: 10/29/2017    SCRIBE #1 NOTE: I, Gwendolyn Baezgloria, am scribing for, and in the presence of, Dr. Paige.       History     Chief Complaint   Patient presents with    Chest Pain     chest pain with SOB since yesterday, intermittent described as pressure and sharp pain. reports was sitting down watching football when the pain started     Time seen by provider: 11:25 PM    This is a 47 y.o. female with a PMHx of HTN and DM who presents with complaint of chest pain. The patient has intermittent chest pain since yesterday. The episode she is currently having has been ongoing since she went to the [employer] game this afternoon. The patient states he went to the [employer] game today, has some drinks and the pain started but improved after a while. The pain worsened while she and her friends were at a casino. She denies the pain being reproducible to touch. She states her pain is 8/10. The patient reports an episode of tremors this afternoon that was resolved after she ate a peppermint.       The history is provided by the patient.     Review of patient's allergies indicates:  No Known Allergies  Past Medical History:   Diagnosis Date    Diabetes mellitus     Hypertension      Past Surgical History:   Procedure Laterality Date    ENDOMETRIAL ABLATION      HYSTERECTOMY  11/12/2015    fibroids    HYSTERECTOMY       Family History   Problem Relation Age of Onset    Colon cancer Father 76    Diabetes Neg Hx     Heart disease Neg Hx      Social History   Substance Use Topics    Smoking status: Never Smoker    Smokeless tobacco: Not on file    Alcohol use 0.6 oz/week     1 Cans of beer per week      Comment: social     Review of Systems   Constitutional: Negative for chills and fever.   HENT: Negative for facial swelling.    Eyes: Negative for visual disturbance.   Respiratory: Positive for chest tightness.    Cardiovascular: Positive for chest pain.   Gastrointestinal: Negative for abdominal pain, nausea and  vomiting.   Genitourinary: Negative for difficulty urinating.   Musculoskeletal: Negative for gait problem.   Skin: Negative for rash.   Neurological: Positive for tremors. Negative for speech difficulty.       Physical Exam     Initial Vitals   BP Pulse Resp Temp SpO2   -- -- -- -- --      MAP       --         Physical Exam    Nursing note and vitals reviewed.  Constitutional: She appears well-developed and well-nourished. She is not diaphoretic. No distress.   HENT:   Head: Normocephalic and atraumatic.   Mouth/Throat: Oropharynx is clear and moist.   Eyes: Conjunctivae and EOM are normal. Pupils are equal, round, and reactive to light.   Neck: Normal range of motion. Neck supple. No JVD present.   Cardiovascular: Normal rate, regular rhythm and normal heart sounds.   No murmur heard.  Pulses:       Radial pulses are 2+ on the right side, and 2+ on the left side.   Pulmonary/Chest: Breath sounds normal. No respiratory distress. She has no wheezes. She has no rhonchi. She has no rales. She exhibits tenderness (anterior chest wall tenderness ).   Abdominal: Soft. Bowel sounds are normal. She exhibits no distension and no mass. There is no tenderness. There is no rebound and no guarding.   Musculoskeletal: Normal range of motion. She exhibits no edema or tenderness.   No sign of DVT   Neurological: She is alert and oriented to person, place, and time. She has normal strength. No cranial nerve deficit or sensory deficit.   Skin: Skin is warm and dry. No rash noted.   Psychiatric: She has a normal mood and affect. Thought content normal.         ED Course   Procedures  Labs Reviewed   CBC W/ AUTO DIFFERENTIAL - Abnormal; Notable for the following:        Result Value    Hemoglobin 11.9 (*)     MCV 81 (*)     MCH 25.9 (*)     RDW 15.3 (*)     All other components within normal limits   COMPREHENSIVE METABOLIC PANEL - Abnormal; Notable for the following:     Glucose 133 (*)     All other components within normal limits    TROPONIN I   B-TYPE NATRIURETIC PEPTIDE   D DIMER, QUANTITATIVE   TROPONIN I     EKG Readings: (Independently Interpreted)   Heart Rate: 90.   Normal sinus rhythm. No ischemic changes.    Other EKG Interpretations: Repeat: Normal sinus rhythm. 83 bpm, no ischemic changes.        X-Rays:   Independently Interpreted Readings:   Chest X-Ray: Normal heart and lungs      Medical Decision Making:   History:   Old Medical Records: I decided to obtain old medical records.  Initial Assessment:   47 y.o. female with 2 days of chest pain that seems to come and go. This episode has been ongoing for the past 9 hours. Will evaluate with cardiac enzymes and d-dimer. Patient appears comfortable. There are not many features to her history and exam that indicate cardiac etiology other than chest pain.   Independently Interpreted Test(s):   I have ordered and independently interpreted X-rays - see prior notes.  I have ordered and independently interpreted EKG Reading(s) - see prior notes  Clinical Tests:   Lab Tests: Ordered and Reviewed  Radiological Study: Ordered and Reviewed  Medical Tests: Ordered and Reviewed  ED Management:  I have reviewed studies, patient with negative troponin and d-dimer. She feels significant improvement in her chest discomfort after a GI cocktail. Will repeat troponin at 3 hours. If negative, I believe this will be sufficient to rule out acute MI. She has been having consistent chest pain for sometime. I doubt dissection or PE.     3:04 AM  Pt repeat troponin is negative. The patient reports she is asymptomatic. I believe this is GI. Will give prescription for Nexium and discharge home.                    ED Course      Clinical Impression:   The encounter diagnosis was Chest pain.    Disposition:   Disposition: Discharged  Condition: Stable                   I, Dr. Jimmy Paige, personally performed the services described in this documentation. All medical record entries made by the scribe were at my  direction and in my presence.  I have reviewed the chart and agree that the record reflects my personal performance and is accurate and complete. Jimmy Paige MD.  7:35 PM 10/31/2017     Jimmy Paige MD  10/31/17 1935

## 2017-12-06 ENCOUNTER — PATIENT MESSAGE (OUTPATIENT)
Dept: INTERNAL MEDICINE | Facility: CLINIC | Age: 47
End: 2017-12-06

## 2017-12-06 DIAGNOSIS — Z12.39 SCREENING FOR BREAST CANCER: Primary | ICD-10-CM

## 2017-12-15 ENCOUNTER — HOSPITAL ENCOUNTER (OUTPATIENT)
Dept: RADIOLOGY | Facility: HOSPITAL | Age: 47
Discharge: HOME | End: 2017-12-15
Attending: FAMILY MEDICINE
Payer: COMMERCIAL

## 2017-12-15 VITALS — HEIGHT: 67 IN | WEIGHT: 280 LBS | BODY MASS INDEX: 43.95 KG/M2

## 2017-12-15 DIAGNOSIS — Z12.39 SCREENING FOR BREAST CANCER: ICD-10-CM

## 2017-12-15 PROCEDURE — 77067 SCR MAMMO BI INCL CAD: CPT | Mod: TC

## 2017-12-15 PROCEDURE — 77067 SCR MAMMO BI INCL CAD: CPT | Mod: 26,,, | Performed by: RADIOLOGY

## 2017-12-15 PROCEDURE — 77063 BREAST TOMOSYNTHESIS BI: CPT | Mod: 26,,, | Performed by: RADIOLOGY

## 2018-02-23 DIAGNOSIS — I10 ESSENTIAL HYPERTENSION: ICD-10-CM

## 2018-02-23 RX ORDER — TRIAMTERENE/HYDROCHLOROTHIAZID 37.5-25 MG
1 TABLET ORAL DAILY
Qty: 90 TABLET | Refills: 0 | Status: SHIPPED | OUTPATIENT
Start: 2018-02-23 | End: 2018-06-13 | Stop reason: SDUPTHER

## 2018-04-13 ENCOUNTER — OFFICE VISIT (OUTPATIENT)
Dept: URGENT CARE | Facility: CLINIC | Age: 48
End: 2018-04-13
Payer: COMMERCIAL

## 2018-04-13 VITALS
DIASTOLIC BLOOD PRESSURE: 87 MMHG | TEMPERATURE: 98 F | HEIGHT: 68 IN | WEIGHT: 282 LBS | HEART RATE: 82 BPM | OXYGEN SATURATION: 100 % | BODY MASS INDEX: 42.74 KG/M2 | RESPIRATION RATE: 18 BRPM | SYSTOLIC BLOOD PRESSURE: 142 MMHG

## 2018-04-13 DIAGNOSIS — M54.6 LEFT-SIDED THORACIC BACK PAIN, UNSPECIFIED CHRONICITY: Primary | ICD-10-CM

## 2018-04-13 PROCEDURE — 3079F DIAST BP 80-89 MM HG: CPT | Mod: CPTII,S$GLB,, | Performed by: NURSE PRACTITIONER

## 2018-04-13 PROCEDURE — 3077F SYST BP >= 140 MM HG: CPT | Mod: CPTII,S$GLB,, | Performed by: NURSE PRACTITIONER

## 2018-04-13 PROCEDURE — 96372 THER/PROPH/DIAG INJ SC/IM: CPT | Mod: S$GLB,,, | Performed by: FAMILY MEDICINE

## 2018-04-13 PROCEDURE — 99213 OFFICE O/P EST LOW 20 MIN: CPT | Mod: 25,S$GLB,, | Performed by: NURSE PRACTITIONER

## 2018-04-13 RX ORDER — CYCLOBENZAPRINE HCL 10 MG
10 TABLET ORAL 3 TIMES DAILY PRN
Qty: 15 TABLET | Refills: 0 | Status: SHIPPED | OUTPATIENT
Start: 2018-04-13 | End: 2018-04-18

## 2018-04-13 RX ORDER — KETOROLAC TROMETHAMINE 30 MG/ML
30 INJECTION, SOLUTION INTRAMUSCULAR; INTRAVENOUS ONCE
Status: COMPLETED | OUTPATIENT
Start: 2018-04-13 | End: 2018-04-13

## 2018-04-13 RX ADMIN — KETOROLAC TROMETHAMINE 30 MG: 30 INJECTION, SOLUTION INTRAMUSCULAR; INTRAVENOUS at 03:04

## 2018-04-13 NOTE — LETTER
April 13, 2018      [facility]  [address]  [facility] [address]  Phone: [phone]  Fax: [phone]       Patient: Dina Martínez   YOB: 1970  Date of Visit: 04/13/2018    To Whom It May Concern:    Sehrri Martínez  was at [facility] on 04/13/2018. She may return to work/school on 04/16/18 with no restrictions. If you have any questions or concerns, or if I can be of further assistance, please do not hesitate to contact me.    Sincerely,        Serena Marcial, NP

## 2018-04-13 NOTE — PATIENT INSTRUCTIONS
Back Pain (Acute or Chronic)    Back pain is one of the most common problems. The good news is that most people feel better in 1 to 2 weeks, and most of the rest in 1 to 2 months. Most people can remain active.  People experience and describe pain differently; not everyone is the same.  · The pain can be sharp, stabbing, shooting, aching, cramping or burning.  · Movement, standing, bending, lifting, sitting, or walking may worsen pain.  · It can be localized to one spot or area, or it can be more generalized.  · It can spread or radiate upwards, to the front, or go down your arms or legs (sciatica).  · It can cause muscle spasm.  Most of the time, mechanical problems with the muscles or spine cause the pain. Mechanical problems are usually caused by an injury to the muscles or ligaments. While illness can cause back pain, it is usually not caused by a serious illness. Mechanical problems include:   · Physical activity such as sports, exercise, work, or normal activity  · Overexertion, lifting, pushing, pulling incorrectly or too aggressively  · Sudden twisting, bending, or stretching from an accident, or accidental movement  · Poor posture  · Stretching or moving wrong, without noticing pain at the time  · Poor coordination, lack of regular exercise (check with your doctor about this)  · Spinal disc disease or arthritis  · Stress  Pain can also be related to pregnancy, or illness like appendicitis, bladder or kidney infections, pelvic infections, and many other things.  Acute back pain usually gets better in 1 to 2 weeks. Back pain related to disk disease, arthritis in the spinal joints or spinal stenosis (narrowing of the spinal canal) can become chronic and last for months or years.  Unless you had a physical injury (for example, a car accident or fall) X-rays are usually not needed for the initial evaluation of back pain. If pain continues and does not respond to medical treatment, X-rays and other tests may be  needed.  Home care  Try these home care recommendations:  · When in bed, try to find a position of comfort. A firm mattress is best. Try lying flat on your back with pillows under your knees. You can also try lying on your side with your knees bent up towards your chest and a pillow between your knees.  · At first, do not try to stretch out the sore spots. If there is a strain, it is not like the good soreness you get after exercising without an injury. In this case, stretching may make it worse.  · Avoid prolong sitting, long car rides, or travel. This puts more stress on the lower back than standing or walking.  · During the first 24 to 72 hours after an acute injury or flare up of chronic back pain, apply an ice pack to the painful area for 20 minutes and then remove it for 20 minutes. Do this over a period of 60 to 90 minutes or several times a day. This will reduce swelling and pain. Wrap the ice pack in a thin towel or plastic to protect your skin.  · You can start with ice, then switch to heat. Heat (hot shower, hot bath, or heating pad) reduces pain and works well for muscle spasms. Heat can be applied to the painful area for 20 minutes then remove it for 20 minutes. Do this over a period of 60 to 90 minutes or several times a day. Do not sleep on a heating pad. It can lead to skin burns or tissue damage.  · You can alternate ice and heat therapy. Talk with your doctor about the best treatment for your back pain.  · Therapeutic massage can help relax the back muscles without stretching them.  · Be aware of safe lifting methods and do not lift anything without stretching first.  Medicines  Talk to your doctor before using medicine, especially if you have other medical problems or are taking other medicines.  · You may use over-the-counter medicine as directed on the bottle to control pain, unless another pain medicine was prescribed. If you have chronic conditions like diabetes, liver or kidney disease,  stomach ulcers, or gastrointestinal bleeding, or are taking blood thinners, talk to your doctor before taking any medicine.  · Be careful if you are given a prescription medicines, narcotics, or medicine for muscle spasms. They can cause drowsiness, affect your coordination, reflexes, and judgement. Do not drive or operate heavy machinery.  Follow-up care  Follow up with your healthcare provider, or as advised.   A radiologist will review any X-rays that were taken. Your provide will notify you of any new findings that may affect your care.  Call 911  Call emergency services if any of the following occur:  · Trouble breathing  · Confusion  · Very drowsy or trouble awakening  · Fainting or loss of consciousness  · Rapid or very slow heart rate  · Loss of bowel or bladder control  When to seek medical advice  Call your healthcare provider right away if any of these occur:   · Pain becomes worse or spreads to your legs  · Weakness or numbness in one or both legs  · Numbness in the groin or genital area  Date Last Reviewed: 7/1/2016 © 2000-2017 [employer]. [address] [place] [address]. All rights reserved. This information is not intended as a substitute for professional medical care. Always follow your healthcare professional's instructions.        Back Care Tips  Caring for your back  These are things you can do to prevent a recurrence of acute back pain and to reduce symptoms from chronic back pain:  · Maintain a healthy weight. If you are overweight, losing weight will help most types of back pain.  · Exercise is an important part of recovery from most types of back pain. The muscles behind and in front of the spine support the back. This means strengthening both the back muscles and the abdominal muscles will provide better support for your spine.   · Swimming and brisk walking are good overall exercises to improve your fitness level.  · Practice safe lifting methods  (below).  · Practice good posture when sitting, standing and walking. Avoid prolonged sitting. This puts more stress on the lower back than standing or walking.  · Wear quality shoes with sufficient arch support. Foot and ankle alignment can affect back symptoms. Women should avoid wearing high heels.  · Therapeutic massage can help relax the back muscles without stretching them.  · During the first 24 to 72 hours after an acute injury or flare-up of chronic back pain, apply an ice pack to the painful area for 20 minutes and then remove it for 20 minutes, over a period of 60 to 90 minutes, or several times a day. As a safety precaution, do not use a heating pad at bedtime. Sleeping on a heating pad can lead to skin burns or tissue damage.  · You can alternate ice and heat therapies.  Medications  Talk to your healthcare provider before using medicines, especially if you have other medical problems or are taking other medicines.  · You may use acetaminophen or ibuprofen to control pain, unless your healthcare provider prescribed other pain medicine. If you have chronic conditions like diabetes, liver or kidney disease, stomach ulcers, or gastrointestinal bleeding, or are taking blood thinners, talk with your healthcare provider before taking any medicines.  · Be careful if you are given prescription pain medicines, narcotics, or medicine for muscle spasm. They can cause drowsiness, affect your coordination, reflexes, and judgment. Do not drive or operate heavy machinery while taking these types of medicines. Take prescription pain medicine only as prescribed by your healthcare provider.  Lumbar stretch  Here is a simple stretching exercise that will help relax muscle spasm and keep your back more limber. If exercise makes your back pain worse, dont do it.  · Lie on your back with your knees bent and both feet on the ground.  · Slowly raise your left knee to your chest as you flatten your lower back against the  floor. Hold for 5 seconds.  · Relax and repeat the exercise with your right knee.  · Do 10 of these exercises for each leg.  Safe lifting method  · Dont bend over at the waist to lift an object off the floor.  Instead, bend your knees and hips in a squat.   · Keep your back and head upright  · Hold the object close to your body, directly in front of you.  · Straighten your legs to lift the object.   · Lower the object to the floor in the reverse fashion.  · If you must slide something across the floor, push it.  Posture tips  Sitting  Sit in chairs with straight backs or low-back support. Keep your knees lower than your hips, with your feet flat on the floor.  When driving, sit up straight. Adjust the seat forward so you are not leaning toward the steering wheel.  A small pillow or rolled towel behind your lower back may help if you are driving long distances.   Standing  When standing for long periods, shift most of your weight to one leg at a time. Alternate legs every few minutes.   Sleeping  The best way to sleep is on your side with your knees bent. Put a low pillow under your head to support your neck in a neutral spine position. Avoid thick pillows that bend your neck to one side. Put a pillow between your legs to further relax your lower back. If you sleep on your back, put pillows under your knees to support your legs in a slightly flexed position. Use a firm mattress. If your mattress sags, replace it, or use a 1/2-inch plywood board under the mattress to add support.  Follow-up care  Follow up with your healthcare provider, or as advised.  If X-rays, a CT scan or an MRI scan were taken, they will be reviewed by a radiologist. You will be notified of any new findings that may affect your care.  Call 911  Seek emergency medical care if any of the following occur:  · Trouble breathing  · Confusion  · Very drowsy  · Fainting or loss of consciousness  · Rapid or very slow heart rate  · Loss of  bowel or  bladder control  When to seek medical care  Call your healthcare provider if any of the following occur:  · Pain becomes worse or spreads to your arms or legs  · Weakness or numbness in one or both arms or legs  · Numbness in the groin area  Date Last Reviewed: 6/1/2016  © 4728-7201 [employer]. [address] [place] [address]. All rights reserved. This information is not intended as a substitute for professional medical care. Always follow your healthcare professional's instructions.    -As we discussed start taking the Flexeril as needed for pain.  -You can also take Ibuprofen as need for pain.  -Avoid heavy lifting for the next few days.

## 2018-04-13 NOTE — PROGRESS NOTES
"Subjective:       Patient ID: Dina Martínez is a 47 y.o. female.    Vitals:  height is 5' 8" (1.727 m) and weight is 127.9 kg (282 lb). Her temperature is 98.4 °F (36.9 °C). Her blood pressure is 142/87 (abnormal) and her pulse is 82. Her respiration is 18 and oxygen saturation is 100%.     Chief Complaint: Back Pain    This is a 47 y.o. female with Past Medical History:  No date: Diabetes mellitus  No date: Hypertension   Past Surgical History:  No date: ENDOMETRIAL ABLATION  11/12/2015: HYSTERECTOMY      Comment: fibroids  No date: HYSTERECTOMY  who presents today with a chief complaint of mid back pain severe for a few days only taking IBP with no help    The patient presents with complains of worsening back pain x 3 days. The patient recently had a cough over the last week but other than that she denies any recent trauma. The patient is a  at [employer]. She denies any urinary symptoms. She denies any lower extremity numbness or tingling. She also denies urinary/fecal incontinence. No rashes present.The pain is reproducible with palpitation. Neuro intact and muscular strength and  equal. No CVA tenderness.The patient verbalizes understanding of the plan.           Back Pain   This is a new problem. The current episode started in the past 7 days. The problem occurs constantly. The problem has been gradually worsening since onset. The pain is present in the costovertebral angle. The quality of the pain is described as shooting, stabbing and aching. Radiates to: across back  The pain is at a severity of 10/10. The pain is severe. The pain is the same all the time. The symptoms are aggravated by sitting, lying down, position and twisting. Stiffness is present all day. Pertinent negatives include no abdominal pain, bladder incontinence, bowel incontinence, dysuria or numbness. She has tried NSAIDs (icy hot ) for the symptoms. The treatment provided no relief.     Review of Systems "   Constitution: Negative for malaise/fatigue.   Skin: Negative for color change and rash.   Musculoskeletal: Positive for back pain. Negative for muscle cramps, muscle weakness and stiffness.   Gastrointestinal: Negative for abdominal pain and bowel incontinence.   Genitourinary: Negative for bladder incontinence, dysuria, flank pain, frequency, hematuria and urgency.   Neurological: Negative for disturbances in coordination and numbness.       Objective:      Physical Exam   Constitutional: She is oriented to person, place, and time. Vital signs are normal. She appears well-developed and well-nourished. She is active and cooperative. No distress.   HENT:   Head: Normocephalic and atraumatic.   Nose: Nose normal.   Mouth/Throat: Oropharynx is clear and moist and mucous membranes are normal.   Eyes: Conjunctivae and lids are normal.   Neck: Trachea normal, normal range of motion, full passive range of motion without pain and phonation normal. Neck supple.   Cardiovascular: Normal rate, regular rhythm, normal heart sounds, intact distal pulses and normal pulses.    Pulmonary/Chest: Effort normal and breath sounds normal.   Abdominal: Soft. Normal appearance and bowel sounds are normal. She exhibits no abdominal bruit, no pulsatile midline mass and no mass.   Musculoskeletal: She exhibits no edema or deformity.        Thoracic back: She exhibits tenderness and pain. She exhibits no bony tenderness, no swelling, no edema and no deformity.        Back:    Neurological: She is alert and oriented to person, place, and time. She has normal strength and normal reflexes. No sensory deficit.   Skin: Skin is warm, dry and intact. She is not diaphoretic.   Psychiatric: She has a normal mood and affect. Her speech is normal and behavior is normal. Judgment and thought content normal. Cognition and memory are normal.   Nursing note and vitals reviewed.      Assessment:       1. Left-sided thoracic back pain, unspecified chronicity         Plan:         Left-sided thoracic back pain, unspecified chronicity  -     cyclobenzaprine (FLEXERIL) 10 MG tablet; Take 1 tablet (10 mg total) by mouth 3 (three) times daily as needed for Muscle spasms.  Dispense: 15 tablet; Refill: 0  -     ketorolac injection 30 mg; Inject 1 mL (30 mg total) into the muscle once.      Back Pain (Acute or Chronic)    Back pain is one of the most common problems. The good news is that most people feel better in 1 to 2 weeks, and most of the rest in 1 to 2 months. Most people can remain active.  People experience and describe pain differently; not everyone is the same.  · The pain can be sharp, stabbing, shooting, aching, cramping or burning.  · Movement, standing, bending, lifting, sitting, or walking may worsen pain.  · It can be localized to one spot or area, or it can be more generalized.  · It can spread or radiate upwards, to the front, or go down your arms or legs (sciatica).  · It can cause muscle spasm.  Most of the time, mechanical problems with the muscles or spine cause the pain. Mechanical problems are usually caused by an injury to the muscles or ligaments. While illness can cause back pain, it is usually not caused by a serious illness. Mechanical problems include:   · Physical activity such as sports, exercise, work, or normal activity  · Overexertion, lifting, pushing, pulling incorrectly or too aggressively  · Sudden twisting, bending, or stretching from an accident, or accidental movement  · Poor posture  · Stretching or moving wrong, without noticing pain at the time  · Poor coordination, lack of regular exercise (check with your doctor about this)  · Spinal disc disease or arthritis  · Stress  Pain can also be related to pregnancy, or illness like appendicitis, bladder or kidney infections, pelvic infections, and many other things.  Acute back pain usually gets better in 1 to 2 weeks. Back pain related to disk disease, arthritis in the spinal joints or  spinal stenosis (narrowing of the spinal canal) can become chronic and last for months or years.  Unless you had a physical injury (for example, a car accident or fall) X-rays are usually not needed for the initial evaluation of back pain. If pain continues and does not respond to medical treatment, X-rays and other tests may be needed.  Home care  Try these home care recommendations:  · When in bed, try to find a position of comfort. A firm mattress is best. Try lying flat on your back with pillows under your knees. You can also try lying on your side with your knees bent up towards your chest and a pillow between your knees.  · At first, do not try to stretch out the sore spots. If there is a strain, it is not like the good soreness you get after exercising without an injury. In this case, stretching may make it worse.  · Avoid prolong sitting, long car rides, or travel. This puts more stress on the lower back than standing or walking.  · During the first 24 to 72 hours after an acute injury or flare up of chronic back pain, apply an ice pack to the painful area for 20 minutes and then remove it for 20 minutes. Do this over a period of 60 to 90 minutes or several times a day. This will reduce swelling and pain. Wrap the ice pack in a thin towel or plastic to protect your skin.  · You can start with ice, then switch to heat. Heat (hot shower, hot bath, or heating pad) reduces pain and works well for muscle spasms. Heat can be applied to the painful area for 20 minutes then remove it for 20 minutes. Do this over a period of 60 to 90 minutes or several times a day. Do not sleep on a heating pad. It can lead to skin burns or tissue damage.  · You can alternate ice and heat therapy. Talk with your doctor about the best treatment for your back pain.  · Therapeutic massage can help relax the back muscles without stretching them.  · Be aware of safe lifting methods and do not lift anything without stretching  first.  Medicines  Talk to your doctor before using medicine, especially if you have other medical problems or are taking other medicines.  · You may use over-the-counter medicine as directed on the bottle to control pain, unless another pain medicine was prescribed. If you have chronic conditions like diabetes, liver or kidney disease, stomach ulcers, or gastrointestinal bleeding, or are taking blood thinners, talk to your doctor before taking any medicine.  · Be careful if you are given a prescription medicines, narcotics, or medicine for muscle spasms. They can cause drowsiness, affect your coordination, reflexes, and judgement. Do not drive or operate heavy machinery.  Follow-up care  Follow up with your healthcare provider, or as advised.   A radiologist will review any X-rays that were taken. Your provide will notify you of any new findings that may affect your care.  Call 911  Call emergency services if any of the following occur:  · Trouble breathing  · Confusion  · Very drowsy or trouble awakening  · Fainting or loss of consciousness  · Rapid or very slow heart rate  · Loss of bowel or bladder control  When to seek medical advice  Call your healthcare provider right away if any of these occur:   · Pain becomes worse or spreads to your legs  · Weakness or numbness in one or both legs  · Numbness in the groin or genital area  Date Last Reviewed: 7/1/2016 © 2000-2017 [employer]. [address] [place] [address], [place] [address]. All rights reserved. This information is not intended as a substitute for professional medical care. Always follow your healthcare professional's instructions.        Back Care Tips  Caring for your back  These are things you can do to prevent a recurrence of acute back pain and to reduce symptoms from chronic back pain:  · Maintain a healthy weight. If you are overweight, losing weight will help most types of back pain.  · Exercise is an important part of recovery from most  types of back pain. The muscles behind and in front of the spine support the back. This means strengthening both the back muscles and the abdominal muscles will provide better support for your spine.   · Swimming and brisk walking are good overall exercises to improve your fitness level.  · Practice safe lifting methods (below).  · Practice good posture when sitting, standing and walking. Avoid prolonged sitting. This puts more stress on the lower back than standing or walking.  · Wear quality shoes with sufficient arch support. Foot and ankle alignment can affect back symptoms. Women should avoid wearing high heels.  · Therapeutic massage can help relax the back muscles without stretching them.  · During the first 24 to 72 hours after an acute injury or flare-up of chronic back pain, apply an ice pack to the painful area for 20 minutes and then remove it for 20 minutes, over a period of 60 to 90 minutes, or several times a day. As a safety precaution, do not use a heating pad at bedtime. Sleeping on a heating pad can lead to skin burns or tissue damage.  · You can alternate ice and heat therapies.  Medications  Talk to your healthcare provider before using medicines, especially if you have other medical problems or are taking other medicines.  · You may use acetaminophen or ibuprofen to control pain, unless your healthcare provider prescribed other pain medicine. If you have chronic conditions like diabetes, liver or kidney disease, stomach ulcers, or gastrointestinal bleeding, or are taking blood thinners, talk with your healthcare provider before taking any medicines.  · Be careful if you are given prescription pain medicines, narcotics, or medicine for muscle spasm. They can cause drowsiness, affect your coordination, reflexes, and judgment. Do not drive or operate heavy machinery while taking these types of medicines. Take prescription pain medicine only as prescribed by your healthcare provider.  Lumbar  stretch  Here is a simple stretching exercise that will help relax muscle spasm and keep your back more limber. If exercise makes your back pain worse, dont do it.  · Lie on your back with your knees bent and both feet on the ground.  · Slowly raise your left knee to your chest as you flatten your lower back against the floor. Hold for 5 seconds.  · Relax and repeat the exercise with your right knee.  · Do 10 of these exercises for each leg.  Safe lifting method  · Dont bend over at the waist to lift an object off the floor.  Instead, bend your knees and hips in a squat.   · Keep your back and head upright  · Hold the object close to your body, directly in front of you.  · Straighten your legs to lift the object.   · Lower the object to the floor in the reverse fashion.  · If you must slide something across the floor, push it.  Posture tips  Sitting  Sit in chairs with straight backs or low-back support. Keep your knees lower than your hips, with your feet flat on the floor.  When driving, sit up straight. Adjust the seat forward so you are not leaning toward the steering wheel.  A small pillow or rolled towel behind your lower back may help if you are driving long distances.   Standing  When standing for long periods, shift most of your weight to one leg at a time. Alternate legs every few minutes.   Sleeping  The best way to sleep is on your side with your knees bent. Put a low pillow under your head to support your neck in a neutral spine position. Avoid thick pillows that bend your neck to one side. Put a pillow between your legs to further relax your lower back. If you sleep on your back, put pillows under your knees to support your legs in a slightly flexed position. Use a firm mattress. If your mattress sags, replace it, or use a 1/2-inch plywood board under the mattress to add support.  Follow-up care  Follow up with your healthcare provider, or as advised.  If X-rays, a CT scan or an MRI scan were taken,  they will be reviewed by a radiologist. You will be notified of any new findings that may affect your care.  Call 911  Seek emergency medical care if any of the following occur:  · Trouble breathing  · Confusion  · Very drowsy  · Fainting or loss of consciousness  · Rapid or very slow heart rate  · Loss of  bowel or bladder control  When to seek medical care  Call your healthcare provider if any of the following occur:  · Pain becomes worse or spreads to your arms or legs  · Weakness or numbness in one or both arms or legs  · Numbness in the groin area  Date Last Reviewed: 6/1/2016  © 0905-2953 [employer]. [address] [place] [address]. All rights reserved. This information is not intended as a substitute for professional medical care. Always follow your healthcare professional's instructions.    -As we discussed start taking the Flexeril as needed for pain.  -You can also take Ibuprofen as need for pain.  -Avoid heavy lifting for the next few days.

## 2018-05-30 ENCOUNTER — HOSPITAL ENCOUNTER (EMERGENCY)
Facility: HOSPITAL | Age: 48
Discharge: HOME | End: 2018-05-31
Attending: EMERGENCY MEDICINE
Payer: COMMERCIAL

## 2018-05-30 VITALS
OXYGEN SATURATION: 99 % | SYSTOLIC BLOOD PRESSURE: 128 MMHG | BODY MASS INDEX: 45 KG/M2 | TEMPERATURE: 98 F | WEIGHT: 280 LBS | DIASTOLIC BLOOD PRESSURE: 77 MMHG | HEART RATE: 78 BPM | RESPIRATION RATE: 15 BRPM | HEIGHT: 66 IN

## 2018-05-30 DIAGNOSIS — R10.9 ABDOMINAL PAIN, UNSPECIFIED ABDOMINAL LOCATION: ICD-10-CM

## 2018-05-30 DIAGNOSIS — R07.9 CHEST PAIN: Primary | ICD-10-CM

## 2018-05-30 LAB
ABO + RH BLD: NORMAL
ALBUMIN SERPL BCP-MCNC: 3.9 G/DL
ALP SERPL-CCNC: 93 U/L
ALT SERPL W/O P-5'-P-CCNC: 16 U/L
ANION GAP SERPL CALC-SCNC: 10 MMOL/L
AST SERPL-CCNC: 17 U/L
B-HCG UR QL: NEGATIVE
BASOPHILS # BLD AUTO: 0.01 K/UL
BASOPHILS NFR BLD: 0.1 %
BILIRUB SERPL-MCNC: 0.2 MG/DL
BILIRUB UR QL STRIP: NEGATIVE
BLD GP AB SCN CELLS X3 SERPL QL: NORMAL
BNP SERPL-MCNC: 83 PG/ML
BUN SERPL-MCNC: 10 MG/DL
CALCIUM SERPL-MCNC: 9.8 MG/DL
CHLORIDE SERPL-SCNC: 103 MMOL/L
CLARITY UR: CLEAR
CO2 SERPL-SCNC: 27 MMOL/L
COLOR UR: YELLOW
CREAT SERPL-MCNC: 0.9 MG/DL
CTP QC/QA: YES
DIFFERENTIAL METHOD: ABNORMAL
EOSINOPHIL # BLD AUTO: 0.1 K/UL
EOSINOPHIL NFR BLD: 1.7 %
ERYTHROCYTE [DISTWIDTH] IN BLOOD BY AUTOMATED COUNT: 14.6 %
EST. GFR  (AFRICAN AMERICAN): >60 ML/MIN/1.73 M^2
EST. GFR  (NON AFRICAN AMERICAN): >60 ML/MIN/1.73 M^2
GLUCOSE SERPL-MCNC: 101 MG/DL
GLUCOSE UR QL STRIP: NEGATIVE
HCT VFR BLD AUTO: 37 %
HGB BLD-MCNC: 11.8 G/DL
HGB UR QL STRIP: NEGATIVE
KETONES UR QL STRIP: NEGATIVE
LEUKOCYTE ESTERASE UR QL STRIP: NEGATIVE
LIPASE SERPL-CCNC: 27 U/L
LYMPHOCYTES # BLD AUTO: 3.3 K/UL
LYMPHOCYTES NFR BLD: 41 %
MCH RBC QN AUTO: 25.9 PG
MCHC RBC AUTO-ENTMCNC: 31.9 G/DL
MCV RBC AUTO: 81 FL
MONOCYTES # BLD AUTO: 0.4 K/UL
MONOCYTES NFR BLD: 5.4 %
NEUTROPHILS # BLD AUTO: 4.2 K/UL
NEUTROPHILS NFR BLD: 51.6 %
NITRITE UR QL STRIP: NEGATIVE
PH UR STRIP: 7 [PH] (ref 5–8)
PLATELET # BLD AUTO: 245 K/UL
PMV BLD AUTO: 9.7 FL
POTASSIUM SERPL-SCNC: 4 MMOL/L
PROT SERPL-MCNC: 7.7 G/DL
PROT UR QL STRIP: NEGATIVE
RBC # BLD AUTO: 4.55 M/UL
SODIUM SERPL-SCNC: 140 MMOL/L
SP GR UR STRIP: <=1.005 (ref 1–1.03)
TROPONIN I SERPL DL<=0.01 NG/ML-MCNC: <0.006 NG/ML
URN SPEC COLLECT METH UR: ABNORMAL
UROBILINOGEN UR STRIP-ACNC: NEGATIVE EU/DL
WBC # BLD AUTO: 8.08 K/UL

## 2018-05-30 PROCEDURE — 81025 URINE PREGNANCY TEST: CPT

## 2018-05-30 PROCEDURE — 83880 ASSAY OF NATRIURETIC PEPTIDE: CPT

## 2018-05-30 PROCEDURE — 81003 URINALYSIS AUTO W/O SCOPE: CPT

## 2018-05-30 PROCEDURE — 96372 THER/PROPH/DIAG INJ SC/IM: CPT

## 2018-05-30 PROCEDURE — 83690 ASSAY OF LIPASE: CPT

## 2018-05-30 PROCEDURE — 81025 URINE PREGNANCY TEST: CPT | Performed by: EMERGENCY MEDICINE

## 2018-05-30 PROCEDURE — 99284 EMERGENCY DEPT VISIT MOD MDM: CPT | Mod: 25

## 2018-05-30 PROCEDURE — 25000003 PHARM REV CODE 250: Performed by: EMERGENCY MEDICINE

## 2018-05-30 PROCEDURE — 86850 RBC ANTIBODY SCREEN: CPT

## 2018-05-30 PROCEDURE — 84484 ASSAY OF TROPONIN QUANT: CPT

## 2018-05-30 PROCEDURE — 85025 COMPLETE CBC W/AUTO DIFF WBC: CPT

## 2018-05-30 PROCEDURE — 93010 ELECTROCARDIOGRAM REPORT: CPT | Mod: ,,, | Performed by: INTERNAL MEDICINE

## 2018-05-30 PROCEDURE — 93005 ELECTROCARDIOGRAM TRACING: CPT

## 2018-05-30 PROCEDURE — 80053 COMPREHEN METABOLIC PANEL: CPT

## 2018-05-30 PROCEDURE — 63600175 PHARM REV CODE 636 W HCPCS: Performed by: EMERGENCY MEDICINE

## 2018-05-30 RX ORDER — SUCRALFATE 1 G/10ML
1 SUSPENSION ORAL
Status: COMPLETED | OUTPATIENT
Start: 2018-05-30 | End: 2018-05-30

## 2018-05-30 RX ORDER — DICYCLOMINE HYDROCHLORIDE 10 MG/ML
20 INJECTION INTRAMUSCULAR
Status: COMPLETED | OUTPATIENT
Start: 2018-05-30 | End: 2018-05-30

## 2018-05-30 RX ADMIN — DICYCLOMINE HYDROCHLORIDE 20 MG: 20 INJECTION, SOLUTION INTRAMUSCULAR at 10:05

## 2018-05-30 RX ADMIN — LIDOCAINE HYDROCHLORIDE: 20 SOLUTION ORAL; TOPICAL at 10:05

## 2018-05-30 RX ADMIN — SUCRALFATE 1 G: 1 SUSPENSION ORAL at 10:05

## 2018-05-31 ENCOUNTER — PATIENT MESSAGE (OUTPATIENT)
Dept: INTERNAL MEDICINE | Facility: CLINIC | Age: 48
End: 2018-05-31

## 2018-05-31 ENCOUNTER — TELEPHONE (OUTPATIENT)
Dept: GASTROENTEROLOGY | Facility: CLINIC | Age: 48
End: 2018-05-31

## 2018-05-31 DIAGNOSIS — K92.1 BLACK STOOL: Primary | ICD-10-CM

## 2018-05-31 RX ORDER — OMEPRAZOLE 20 MG/1
20 CAPSULE, DELAYED RELEASE ORAL DAILY
Qty: 30 CAPSULE | Refills: 0 | Status: SHIPPED | OUTPATIENT
Start: 2018-05-31 | End: 2018-06-12 | Stop reason: SDUPTHER

## 2018-05-31 RX ORDER — SUCRALFATE 1 G/1
1 TABLET ORAL 4 TIMES DAILY PRN
Qty: 30 TABLET | Refills: 0 | Status: SHIPPED | OUTPATIENT
Start: 2018-05-31 | End: 2018-06-12 | Stop reason: SDUPTHER

## 2018-05-31 NOTE — ED NOTES
Pt here c/o having black stools for 1 week-either partially black or brown mixed with black. Denies weakness or dizziness. Has left upper quad abd pain and mild nausea with decreased appetite. Chest pains have been present to center left and right intermittently. Pt is AAOx3, resp are regular and unlabored. Trace edema to lower ext-pt states this is normal for her in afternoons. Pt having 2 BM daily. Denies urinary s/s.

## 2018-05-31 NOTE — TELEPHONE ENCOUNTER
----- Message from Rachel Caputo sent at 5/31/2018  9:54 AM CDT -----  Contact: [phone]  Went to er with noncardiac chest pain, abdominal pain, and dark stool - offered 6/21 at the [facility] - patient wants to be seen sooner - please call patient at [phone]

## 2018-05-31 NOTE — ED PROVIDER NOTES
Encounter Date: 5/30/2018    SCRIBE #1 NOTE: I, Magalie Baker, am scribing for, and in the presence of,  Dr. Gallo. I have scribed the entire note.       History     Chief Complaint   Patient presents with    Chest Pain     reports mid sternal chest tightness X 1 week, associated with LUQ abd pain which is worse after eating.     Abdominal Pain     pt reports has been having black stools as well.      Dina Martínez is a 47 y.o. female who  has a past medical history of Diabetes mellitus and Hypertension.    The patient presents to the ED due to intermittent chest pain. She reports onset of symptoms about 1 week ago. The patient states the pain occurs throughout the chest. She describes the pain as pressure.  When the pain occurs it lasts for about 1 minute.  She states the pain is mostly noted after eating and sometimes with walking. The patient denies any associated shortness of breath.  The patient also complains of left upper abdominal pain. She reports onset of symptoms was also about 1 week ago. The patient describes the pain as cramping. She notes associated nausea and black stool but denies any vomiting, diarrhea, urinary symptoms, fever or chills. The patient notes the pain is worse after eating. She has tried Nexium, Kaopectate and other OTC medications with minimal relief.      The history is provided by the patient.     Review of patient's allergies indicates:  No Known Allergies  Past Medical History:   Diagnosis Date    Diabetes mellitus     Hypertension      Past Surgical History:   Procedure Laterality Date    ENDOMETRIAL ABLATION      HYSTERECTOMY  11/12/2015    fibroids    HYSTERECTOMY       Family History   Problem Relation Age of Onset    Colon cancer Father 76    Diabetes Neg Hx     Heart disease Neg Hx      Social History   Substance Use Topics    Smoking status: Never Smoker    Smokeless tobacco: Not on file    Alcohol use 0.6 oz/week     1 Cans of beer per week      Comment:  social     Review of Systems   Constitutional: Negative for fever.   Respiratory: Negative for shortness of breath.    Cardiovascular: Positive for chest pain.   Gastrointestinal: Positive for abdominal pain and nausea. Negative for diarrhea and vomiting.        Black colored stool   Genitourinary: Negative for dysuria and frequency.   All other systems reviewed and are negative.      Physical Exam     Initial Vitals [05/30/18 2026]   BP Pulse Resp Temp SpO2   (!) 193/88 81 15 97.9 °F (36.6 °C) 100 %      MAP       123         Physical Exam    Nursing note and vitals reviewed.  Constitutional: She appears well-developed and well-nourished. No distress.   HENT:   Head: Normocephalic and atraumatic.   Eyes: Conjunctivae are normal.   Neck: Normal range of motion.   Cardiovascular: Normal rate, regular rhythm and normal heart sounds.   No murmur heard.  Pulmonary/Chest: Breath sounds normal. No respiratory distress.   Abdominal: Soft. Bowel sounds are normal. She exhibits no distension. There is no tenderness. There is no rebound and no guarding.   Musculoskeletal: Normal range of motion.   Neurological: She is alert and oriented to person, place, and time.   Skin: Skin is warm and dry.   Psychiatric: She has a normal mood and affect. Her behavior is normal.         ED Course   Procedures  Labs Reviewed   URINALYSIS - Abnormal; Notable for the following:        Result Value    Specific Gravity, UA <=1.005 (*)     All other components within normal limits   CBC W/ AUTO DIFFERENTIAL - Abnormal; Notable for the following:     Hemoglobin 11.8 (*)     MCV 81 (*)     MCH 25.9 (*)     MCHC 31.9 (*)     RDW 14.6 (*)     All other components within normal limits   COMPREHENSIVE METABOLIC PANEL   LIPASE   TROPONIN I   B-TYPE NATRIURETIC PEPTIDE   OCCULT BLOOD X 1, STOOL   POCT URINE PREGNANCY   TYPE & SCREEN     EKG Readings: (Independently Interpreted)   Initial Reading: No STEMI. Rhythm: Normal Sinus Rhythm. Heart Rate: 81.  Ectopy: No Ectopy. Conduction: Normal. ST Segments: Normal ST Segments. Clinical Impression: Normal Sinus Rhythm Other Impression: flattened T waves in inferior leads; LVH     Imaging Results          X-Ray Chest PA And Lateral (Final result)  Result time 05/30/18 21:37:56    Final result by Freedom Olmstead MD (05/30/18 21:37:56)                 Impression:      No acute process.      Electronically signed by: Freedom Olmstead MD  Date:    05/30/2018  Time:    21:37             Narrative:    EXAMINATION:  XR CHEST PA AND LATERAL    CLINICAL HISTORY:  Chest pain.    TECHNIQUE:  PA and lateral views of the chest were performed.    COMPARISON:  Chest x-ray dated 10/30/2017.    FINDINGS:  The trachea is unremarkable.  The cardiomediastinal silhouette is within normal limits.  The hemidiaphragms are unremarkable.  There is no evidence of free air beneath the hemidiaphragms.  There are no pleural effusions.  There is no evidence of a pneumothorax.  There is no evidence of pneumomediastinum.  No airspace opacity is present.  The osseous structures are unremarkable.                                 Medical Decision Making:   Independently Interpreted Test(s):   I have ordered and independently interpreted EKG Reading(s) - see prior notes  Clinical Tests:   Lab Tests: Ordered and Reviewed  Radiological Study: Ordered and Reviewed  Medical Tests: Ordered and Reviewed  ED Management:  Intermittent CP and abd pain x 1 week, worse after eating.  Hx of GERD and not on any daily medications.  No evidence of AMI.  No other acute lab abnormalities.  Pain did occur in ER and was relieved with GI cocktail.  I will treat with daily prilosec and carafate prn.  Pt advised to follow up with PCP next week.  Return to ER for worsening symptoms.                      Clinical Impression:     1. Chest pain    2. Abdominal pain, unspecified abdominal location         I, Dr. Brynn Gallo, personally performed the services described in this documentation.    All medical record entries made by the scribe were at my direction and in my presence.   I have reviewed the chart and agree that the record is accurate and complete.   Brynn Gallo MD.  10:21 PM 05/31/2018                       Brynn Gallo MD  05/31/18 0010

## 2018-06-01 ENCOUNTER — OFFICE VISIT (OUTPATIENT)
Dept: GASTROENTEROLOGY | Facility: CLINIC | Age: 48
End: 2018-06-01
Payer: COMMERCIAL

## 2018-06-01 ENCOUNTER — PATIENT OUTREACH (OUTPATIENT)
Dept: ADMINISTRATIVE | Facility: HOSPITAL | Age: 48
End: 2018-06-01

## 2018-06-01 VITALS
HEART RATE: 86 BPM | BODY MASS INDEX: 44.66 KG/M2 | DIASTOLIC BLOOD PRESSURE: 74 MMHG | SYSTOLIC BLOOD PRESSURE: 124 MMHG | WEIGHT: 276.69 LBS

## 2018-06-01 DIAGNOSIS — R19.5 DARK STOOLS: ICD-10-CM

## 2018-06-01 DIAGNOSIS — R10.13 EPIGASTRIC PAIN: Primary | ICD-10-CM

## 2018-06-01 DIAGNOSIS — D50.9 MICROCYTIC ANEMIA: ICD-10-CM

## 2018-06-01 PROCEDURE — 99999 PR PBB SHADOW E&M-EST. PATIENT-LVL II: CPT | Mod: PBBFAC,,, | Performed by: INTERNAL MEDICINE

## 2018-06-01 PROCEDURE — 99205 OFFICE O/P NEW HI 60 MIN: CPT | Mod: S$GLB,,, | Performed by: INTERNAL MEDICINE

## 2018-06-01 PROCEDURE — 3078F DIAST BP <80 MM HG: CPT | Mod: CPTII,S$GLB,, | Performed by: INTERNAL MEDICINE

## 2018-06-01 PROCEDURE — 3074F SYST BP LT 130 MM HG: CPT | Mod: CPTII,S$GLB,, | Performed by: INTERNAL MEDICINE

## 2018-06-01 PROCEDURE — 3008F BODY MASS INDEX DOCD: CPT | Mod: CPTII,S$GLB,, | Performed by: INTERNAL MEDICINE

## 2018-06-01 NOTE — PROGRESS NOTES
[facility] is committed to your overall health.  To help you get the most out of each of your visits, we will review your information to make sure you are up to date on all of your recommended tests and/or procedures.       Your PCP  Richelle Hong MD   found that you may be due for:       Health Maintenance Due   Topic Date Due    TETANUS VACCINE  09/24/1988             If you have had any of the above done at another facility, please bring the records or information with you so that your record at [facility] will be complete.  If you would like to schedule any of these, please contact me.     If you are currently taking medication, please bring it with you to your appointment for review.     Also, if you have any type of Advanced Directives, please bring them with you to your office visit so we may scan them into your chart.       Thank you for Choosing [facility] for your healthcare needs.        Additional Information  If you have questions, you can email [email or web address] or call [phone]  to talk to our [facility] staff. Remember, [facility] is NOT to be used for urgent needs. For medical emergencies, dial 911.

## 2018-06-01 NOTE — LETTER
June 1, 2018      Richelle Hong MD  [address] [place] [address]  [address]  [place] [address]           [facility]  [address]  [place] [address]  Phone: [phone]          Patient: Dina Martínez   MR Number: [id number]   YOB: 1970   Date of Visit: 6/1/2018       Dear Dr. Richelle Hong:    Thank you for referring Dina Martínez to me for evaluation. Attached you will find relevant portions of my assessment and plan of care.    If you have questions, please do not hesitate to call me. I look forward to following Dina Martínez along with you.    Sincerely,    Christine Vizcaino MD    Enclosure  CC:  No Recipients    If you would like to receive this communication electronically, please contact [email or web address] or [phone] to request more information on [employer] access.    For providers and/or their staff who would like to refer a patient to [facility], please contact us through our one-stop-shop provider referral line, [facility] , at [phone].    If you feel you have received this communication in error or would no longer like to receive these types of communications, please e-mail [email or web address]

## 2018-06-01 NOTE — PROGRESS NOTES
[facility]     CC: Abdominal Pain    HPI 47 y.o. female with history of DM, HTN and obesity, presents with 1.5 weeks of moderate to severe, primarily post-prandial, epigastric abdominal pain, associated with dark, loose stools. Her pain became severe and associated with chest burning, thus she recently presented to the ED and was discharged home with PPI and Carafate after unrevealing labs, troponin and EKG. Her symptoms have somewhat improved with PPI and Carafate (she takes PPI early in AM). She denies dysphagia. She has no prior EGD. Her father had colon cancer and her last colonoscopy was notable for 2 small polyps, performed in 2014. She underwent total abdominal hysterectomy in 2015 for heavy menses. Prior to that time she was told to take iron pills but not since.     Past Medical History:   Diagnosis Date    Diabetes mellitus     Hypertension        Past Surgical History:   Procedure Laterality Date    ENDOMETRIAL ABLATION      HYSTERECTOMY  11/12/2015    fibroids    HYSTERECTOMY         Social History   Substance Use Topics    Smoking status: Never Smoker    Smokeless tobacco: Not on file    Alcohol use 0.6 oz/week     1 Cans of beer per week      Comment: social   -No illicits    Family History   Problem Relation Age of Onset    Colon cancer Father 76    Diabetes Neg Hx     Heart disease Neg Hx        Review of Systems  General ROS: negative for - chills, fever or weight loss  Psychological ROS: negative for - hallucination, depression or suicidal ideation  Ophthalmic ROS: negative for - blurry vision, photophobia or eye pain  ENT ROS: negative for - epistaxis, sore throat or rhinorrhea  Respiratory ROS: no cough, shortness of breath, or wheezing  Cardiovascular ROS: no chest pain or dyspnea on exertion  Gastrointestinal ROS: + epigastric pain, + dark stools, no dypshagia  Genito-Urinary ROS: no dysuria, trouble voiding, or hematuria  Musculoskeletal ROS: negative for - arthralgia,  myalgia, weakness  Neurological ROS: no syncope or seizures; no ataxia  Dermatological ROS: negative for pruritis, rash and jaundice    Physical Examination  /74   Pulse 86   Wt 125.5 kg (276 lb 10.8 oz)   LMP 10/25/2015   BMI 44.66 kg/m²   General appearance: alert, cooperative, no distress  HENT: Normocephalic, atraumatic, neck symmetrical, no nasal discharge   Eyes: conjunctivae/corneas clear, PERRL, EOM's intact, sclera anicteric  Lungs: clear to auscultation bilaterally, no dullness to percussion bilaterally, symmetric expansion, breathing unlabored  Heart: regular rate and rhythm without rub; no displacement of the PMI   Abdomen: obese, mild ttp in epigastrium without rebound or guarding,   Extremities: extremities symmetric; no clubbing, cyanosis, or edema  Integument: Skin color, texture, turgor normal; no rashes; hair distrubution normal, no jaundice  Neurologic: Alert and oriented X 3, no focal sensory or motor neurologic deficits  Psychiatric: no pressured speech; normal affect; no evidence of impaired cognition, no anxiety/depression     Labs:  Lab Results   Component Value Date    WBC 8.08 05/30/2018    HGB 11.8 (L) 05/30/2018    HCT 37.0 05/30/2018    MCV 81 (L) 05/30/2018     05/30/2018     -LFTs -normal    Imaging:  CXR 5/30/18 was independently visualized and reviewed by me and showed clear lungs    Assessment:   47 y.o. female presents with chest pain and epigastric pain for the last several weeks that is worse after eating and associated with dark stools. She also has microcytic anemia.     Plan:  1.Epigastric pain/Chest pain/Dark stools, ? PUD  -Continue Prilosec (instructed to take 30 minutes prior to eating) and Carafate  -Schedule EGD with biopsies for H.pylori (patient prefers [facility] as lives in [place], will attempt to arrange at that facility)  -If EGD unreamrkable and symptoms not improved with PPI will check abdomianl ultrasound as next step  -Avoid  NSAIDs    2.Microcytic Anemia  -Ferritin, Iron, TIBC    Christine Vizcaino MD  [facility]  [address], [address]  [place] [address]  Office: [phone]  Fax: [phone]

## 2018-06-05 ENCOUNTER — TELEPHONE (OUTPATIENT)
Dept: GASTROENTEROLOGY | Facility: CLINIC | Age: 48
End: 2018-06-05

## 2018-06-05 DIAGNOSIS — R07.9 CHEST PAIN, UNSPECIFIED TYPE: ICD-10-CM

## 2018-06-05 DIAGNOSIS — R10.13 EPIGASTRIC PAIN: Primary | ICD-10-CM

## 2018-06-05 DIAGNOSIS — R19.5 DARK STOOLS: ICD-10-CM

## 2018-06-05 NOTE — TELEPHONE ENCOUNTER
Returned call to pt regarding scheduling for her procedure in [place]. Informed pt that our office sent a message to [facility], pt stated she did receive a call to schedule an appt with [facility], so pt will be able to get her procedure in [place].

## 2018-06-05 NOTE — TELEPHONE ENCOUNTER
Spoke with pt and she had an OV with Dr. Vizcaino in [place]. Pt would like to be scheduled for an EGD in [place]. Pt has been scheduled with Dr. Cooney on 6/27/18. Prep and instructions were reviewed with pt and mailed out to home address. Verbal Understanding.

## 2018-06-05 NOTE — TELEPHONE ENCOUNTER
----- Message from Marjorie Campuzano sent at 6/5/2018  8:58 AM CDT -----  Contact: self/[phone]  Patient would like to be scheduled for an endoscopy.   She saw Dr. Vizcaino in [place], who recommended this procedure.      Patient wants it done in [place] as this is closer to her home and transportation will be available.    Please call and advise.

## 2018-06-05 NOTE — TELEPHONE ENCOUNTER
----- Message from Annamarie Barnhart sent at 6/5/2018  8:51 AM CDT -----  Type: Needs Medical Advice    Who Called:  patient  Symptoms (please be specific):  Brianna  How long has patient had these symptoms: Brianna  Pharmacy name and phone #: [facility]  Best Call Back Number: [phone] (home)     Additional Information: Wanted to check on the status of [facility]

## 2018-06-11 ENCOUNTER — PATIENT MESSAGE (OUTPATIENT)
Dept: GASTROENTEROLOGY | Facility: CLINIC | Age: 48
End: 2018-06-11

## 2018-06-13 ENCOUNTER — OFFICE VISIT (OUTPATIENT)
Dept: INTERNAL MEDICINE | Facility: CLINIC | Age: 48
End: 2018-06-13
Payer: COMMERCIAL

## 2018-06-13 VITALS
SYSTOLIC BLOOD PRESSURE: 130 MMHG | DIASTOLIC BLOOD PRESSURE: 84 MMHG | HEART RATE: 87 BPM | OXYGEN SATURATION: 99 % | WEIGHT: 279.31 LBS | BODY MASS INDEX: 44.89 KG/M2 | HEIGHT: 66 IN

## 2018-06-13 DIAGNOSIS — I10 ESSENTIAL HYPERTENSION: ICD-10-CM

## 2018-06-13 DIAGNOSIS — Z51.81 ENCOUNTER FOR MONITORING LONG-TERM PROTON PUMP INHIBITOR THERAPY: ICD-10-CM

## 2018-06-13 DIAGNOSIS — Z23 NEED FOR TDAP VACCINATION: ICD-10-CM

## 2018-06-13 DIAGNOSIS — G47.33 OSA (OBSTRUCTIVE SLEEP APNEA): ICD-10-CM

## 2018-06-13 DIAGNOSIS — E55.9 VITAMIN D DEFICIENCY: ICD-10-CM

## 2018-06-13 DIAGNOSIS — Z13.6 ENCOUNTER FOR LIPID SCREENING FOR CARDIOVASCULAR DISEASE: ICD-10-CM

## 2018-06-13 DIAGNOSIS — Z80.0 FAMILY HISTORY OF COLON CANCER: ICD-10-CM

## 2018-06-13 DIAGNOSIS — M23.8X1 KNEE CREPITUS, RIGHT: ICD-10-CM

## 2018-06-13 DIAGNOSIS — Z00.00 ANNUAL PHYSICAL EXAM: Primary | ICD-10-CM

## 2018-06-13 DIAGNOSIS — K63.5 POLYP OF COLON, UNSPECIFIED PART OF COLON, UNSPECIFIED TYPE: ICD-10-CM

## 2018-06-13 DIAGNOSIS — M25.561 ACUTE PAIN OF RIGHT KNEE: ICD-10-CM

## 2018-06-13 DIAGNOSIS — Z13.220 ENCOUNTER FOR LIPID SCREENING FOR CARDIOVASCULAR DISEASE: ICD-10-CM

## 2018-06-13 DIAGNOSIS — Z79.899 ENCOUNTER FOR MONITORING LONG-TERM PROTON PUMP INHIBITOR THERAPY: ICD-10-CM

## 2018-06-13 DIAGNOSIS — R73.03 PREDIABETES: ICD-10-CM

## 2018-06-13 PROCEDURE — 3075F SYST BP GE 130 - 139MM HG: CPT | Mod: CPTII,S$GLB,, | Performed by: FAMILY MEDICINE

## 2018-06-13 PROCEDURE — 90715 TDAP VACCINE 7 YRS/> IM: CPT | Mod: S$GLB,,, | Performed by: FAMILY MEDICINE

## 2018-06-13 PROCEDURE — 99999 PR PBB SHADOW E&M-EST. PATIENT-LVL IV: CPT | Mod: PBBFAC,,, | Performed by: FAMILY MEDICINE

## 2018-06-13 PROCEDURE — 90471 IMMUNIZATION ADMIN: CPT | Mod: S$GLB,,, | Performed by: FAMILY MEDICINE

## 2018-06-13 PROCEDURE — 99396 PREV VISIT EST AGE 40-64: CPT | Mod: 25,S$GLB,, | Performed by: FAMILY MEDICINE

## 2018-06-13 PROCEDURE — 3079F DIAST BP 80-89 MM HG: CPT | Mod: CPTII,S$GLB,, | Performed by: FAMILY MEDICINE

## 2018-06-13 RX ORDER — DICLOFENAC SODIUM 10 MG/G
2 GEL TOPICAL 4 TIMES DAILY
Qty: 100 G | Refills: 0 | Status: ON HOLD | OUTPATIENT
Start: 2018-06-13 | End: 2018-06-27 | Stop reason: HOSPADM

## 2018-06-13 RX ORDER — TRIAMTERENE/HYDROCHLOROTHIAZID 37.5-25 MG
1 TABLET ORAL DAILY
Qty: 90 TABLET | Refills: 1 | Status: SHIPPED | OUTPATIENT
Start: 2018-06-13 | End: 2019-01-24 | Stop reason: SDUPTHER

## 2018-06-13 NOTE — PROGRESS NOTES
After obtaining consent, and per orders of Dr. Hong, injection of tdap Lot [id number] 9/22/19 given in the LD by OLIVIER SWANN. Patient tolerated well and band aid applied. Patient instructed to remain in clinic for 15 minutes afterwards, and to report any adverse reaction to me immediately.

## 2018-06-13 NOTE — PROGRESS NOTES
"Subjective:       Patient ID: Dina Martínez is a 47 y.o. female.    Chief Complaint: Annual Exam    HPI   46 y/o HTN, BILL has home CPAP, and family hx of colon cancer, colon polyps due in 2019 is here for annual exam.  Doing ok, denies f, has some nausea, denies v, has epigastric pain that has improved with ppi and carafate, denies d/constipation, was having dark stools that has resolved, has EGD set for this month, denies cp/sob/urinary sx.  Sleeping ok, using cpap most nights. Eating healthy and crummy.  She is not doing dedicated exercise, she is active at work, she is a member of [employer] but not going.  R knee pain started this past Sunday, no fall or injury, pain is sharp over medial and back of R knee, worse with activity, comes and goes multiple times a day, feels like she can hear something rubbing, has been avoiding ibuprofen secondary to epigastric pain and upcoming endoscopy  HTN: did not take meds today, has home cuff  Eye exam due  Dental utd  Vaccines:tdap today  GYN: mmg due in December    Review of Systems   Constitutional: Negative for activity change, appetite change, fatigue and fever.   Respiratory: Negative for cough and shortness of breath.    Cardiovascular: Negative for chest pain, palpitations and leg swelling.   Gastrointestinal: Positive for abdominal pain and nausea. Negative for constipation, diarrhea and vomiting.   Genitourinary: Negative for difficulty urinating and dysuria.   Skin: Negative for rash.   Neurological: Negative for dizziness and light-headedness.   Psychiatric/Behavioral: Negative for sleep disturbance.       Objective:      /84   Pulse 87   Ht 5' 6" (1.676 m)   Wt 126.7 kg (279 lb 5.2 oz)   LMP 10/25/2015   SpO2 99%   BMI 45.08 kg/m²   Physical Exam   Constitutional: She appears well-developed and well-nourished.   HENT:   Head: Normocephalic and atraumatic.   Mouth/Throat: No oropharyngeal exudate.   Neck: Normal range of motion. Neck supple. " No thyromegaly present.   Cardiovascular: Normal rate, regular rhythm and normal heart sounds.    Pulmonary/Chest: Effort normal and breath sounds normal. No respiratory distress.   Abdominal: Soft. Bowel sounds are normal. She exhibits no distension. There is no tenderness.   Musculoskeletal: Normal range of motion. She exhibits no edema or tenderness.   Bilateral knee crepitus   Lymphadenopathy:     She has no cervical adenopathy.   Neurological: She is alert.   Skin: Skin is warm and dry.   Psychiatric: She has a normal mood and affect.   Nursing note and vitals reviewed.      Assessment:       1. Annual physical exam    2. Need for Tdap vaccination    3. Essential hypertension    4. Prediabetes    5. Polyp of colon, unspecified part of colon, unspecified type    6. Family history of colon cancer    7. BILL (obstructive sleep apnea)    8. Vitamin D deficiency    9. Encounter for lipid screening for cardiovascular disease    10. Encounter for monitoring long-term proton pump inhibitor therapy    11. Acute pain of right knee    12. Knee crepitus, right        Plan:   Dina Robbins was seen today for annual exam.    Diagnoses and all orders for this visit:    Annual physical exam  -     CBC auto differential; Future  -     Hemoglobin A1c; Future  -     Comprehensive metabolic panel; Future  -     Lipid panel; Future  -     TSH; Future  -     Vitamin D; Future    Need for Tdap vaccination  -     Tdap Vaccine; Future  -     Tdap Vaccine    Essential hypertension  -     triamterene-hydrochlorothiazide 37.5-25 mg (MAXZIDE-25) 37.5-25 mg per tablet; Take 1 tablet by mouth once daily.  -     Comprehensive metabolic panel; Future  -     Lipid panel; Future    Prediabetes  -     Hemoglobin A1c; Future    Polyp of colon, unspecified part of colon, unspecified type    Family history of colon cancer    BILL (obstructive sleep apnea)    Vitamin D deficiency  -     Vitamin D; Future    Encounter for lipid screening for cardiovascular  disease  -     Lipid panel; Future    Encounter for monitoring long-term proton pump inhibitor therapy  -     Vitamin B12; Future  -     Magnesium; Future    Acute pain of right knee  -     X-Ray Knee 1 or 2 View Right; Future  -     diclofenac sodium 1 % Gel; Apply 2 grams topically 4 (four) times daily.    Knee crepitus, right  -     diclofenac sodium 1 % Gel; Apply 2 grams topically 4 (four) times daily.

## 2018-06-14 ENCOUNTER — HOSPITAL ENCOUNTER (OUTPATIENT)
Dept: RADIOLOGY | Facility: HOSPITAL | Age: 48
Discharge: HOME | End: 2018-06-14
Attending: FAMILY MEDICINE
Payer: COMMERCIAL

## 2018-06-14 DIAGNOSIS — M25.561 ACUTE PAIN OF RIGHT KNEE: ICD-10-CM

## 2018-06-14 PROCEDURE — 73560 X-RAY EXAM OF KNEE 1 OR 2: CPT | Mod: TC,RT

## 2018-06-14 PROCEDURE — 73560 X-RAY EXAM OF KNEE 1 OR 2: CPT | Mod: 26,RT,, | Performed by: RADIOLOGY

## 2018-06-15 ENCOUNTER — TELEPHONE (OUTPATIENT)
Dept: INTERNAL MEDICINE | Facility: CLINIC | Age: 48
End: 2018-06-15

## 2018-06-15 DIAGNOSIS — R73.03 PREDIABETES: Primary | ICD-10-CM

## 2018-06-15 RX ORDER — METFORMIN HYDROCHLORIDE 500 MG/1
500 TABLET ORAL 2 TIMES DAILY WITH MEALS
Qty: 180 TABLET | Refills: 3 | Status: SHIPPED | OUTPATIENT
Start: 2018-06-15 | End: 2019-02-14 | Stop reason: SDUPTHER

## 2018-06-15 NOTE — TELEPHONE ENCOUNTER
Spoke to patient, gave results, patient understood and verbalized. She is willing to start on metformin.

## 2018-06-15 NOTE — TELEPHONE ENCOUNTER
Please let patient know her blood sugar is elevated, I recommend we start metformin and repeat her A1c in 2 months.  Her vitamin-D is also to please have her take over-the-counter vitamin D3 2000 international units daily.

## 2018-06-27 ENCOUNTER — ANESTHESIA EVENT (OUTPATIENT)
Dept: ENDOSCOPY | Facility: HOSPITAL | Age: 48
End: 2018-06-27
Payer: COMMERCIAL

## 2018-06-27 ENCOUNTER — ANESTHESIA (OUTPATIENT)
Dept: ENDOSCOPY | Facility: HOSPITAL | Age: 48
End: 2018-06-27
Payer: COMMERCIAL

## 2018-06-27 ENCOUNTER — HOSPITAL ENCOUNTER (OUTPATIENT)
Facility: HOSPITAL | Age: 48
Discharge: HOME | End: 2018-06-27
Attending: INTERNAL MEDICINE | Admitting: INTERNAL MEDICINE
Payer: COMMERCIAL

## 2018-06-27 ENCOUNTER — SURGERY (OUTPATIENT)
Age: 48
End: 2018-06-27

## 2018-06-27 VITALS
SYSTOLIC BLOOD PRESSURE: 134 MMHG | HEIGHT: 66 IN | DIASTOLIC BLOOD PRESSURE: 66 MMHG | WEIGHT: 276 LBS | HEART RATE: 72 BPM | BODY MASS INDEX: 44.36 KG/M2 | OXYGEN SATURATION: 100 % | TEMPERATURE: 98 F | RESPIRATION RATE: 17 BRPM

## 2018-06-27 DIAGNOSIS — R10.13 EPIGASTRIC PAIN: Primary | ICD-10-CM

## 2018-06-27 LAB
GLUCOSE SERPL-MCNC: 93 MG/DL (ref 70–110)
POCT GLUCOSE: 93 MG/DL (ref 70–110)

## 2018-06-27 PROCEDURE — 43239 EGD BIOPSY SINGLE/MULTIPLE: CPT | Mod: ,,, | Performed by: INTERNAL MEDICINE

## 2018-06-27 PROCEDURE — 88305 TISSUE EXAM BY PATHOLOGIST: CPT | Performed by: PATHOLOGY

## 2018-06-27 PROCEDURE — 25000003 PHARM REV CODE 250: Performed by: NURSE ANESTHETIST, CERTIFIED REGISTERED

## 2018-06-27 PROCEDURE — 37000009 HC ANESTHESIA EA ADD 15 MINS: Performed by: INTERNAL MEDICINE

## 2018-06-27 PROCEDURE — 82962 GLUCOSE BLOOD TEST: CPT | Performed by: INTERNAL MEDICINE

## 2018-06-27 PROCEDURE — 25000003 PHARM REV CODE 250: Performed by: INTERNAL MEDICINE

## 2018-06-27 PROCEDURE — 43239 EGD BIOPSY SINGLE/MULTIPLE: CPT | Performed by: INTERNAL MEDICINE

## 2018-06-27 PROCEDURE — 63600175 PHARM REV CODE 636 W HCPCS: Performed by: NURSE ANESTHETIST, CERTIFIED REGISTERED

## 2018-06-27 PROCEDURE — 27201012 HC FORCEPS, HOT/COLD, DISP: Performed by: INTERNAL MEDICINE

## 2018-06-27 PROCEDURE — 88305 TISSUE EXAM BY PATHOLOGIST: CPT | Mod: 26,,, | Performed by: PATHOLOGY

## 2018-06-27 PROCEDURE — 37000008 HC ANESTHESIA 1ST 15 MINUTES: Performed by: INTERNAL MEDICINE

## 2018-06-27 RX ORDER — GLYCOPYRROLATE 0.2 MG/ML
INJECTION INTRAMUSCULAR; INTRAVENOUS
Status: DISCONTINUED | OUTPATIENT
Start: 2018-06-27 | End: 2018-06-27

## 2018-06-27 RX ORDER — PROPOFOL 10 MG/ML
VIAL (ML) INTRAVENOUS
Status: DISCONTINUED | OUTPATIENT
Start: 2018-06-27 | End: 2018-06-27

## 2018-06-27 RX ORDER — LIDOCAINE HCL/PF 100 MG/5ML
SYRINGE (ML) INTRAVENOUS
Status: DISCONTINUED | OUTPATIENT
Start: 2018-06-27 | End: 2018-06-27

## 2018-06-27 RX ORDER — SODIUM CHLORIDE 9 MG/ML
INJECTION, SOLUTION INTRAVENOUS CONTINUOUS
Status: DISCONTINUED | OUTPATIENT
Start: 2018-06-27 | End: 2018-06-27 | Stop reason: HOSPADM

## 2018-06-27 RX ADMIN — TOPICAL ANESTHETIC 1 EACH: 200 SPRAY DENTAL; PERIODONTAL at 11:06

## 2018-06-27 RX ADMIN — PROPOFOL 50 MG: 10 INJECTION, EMULSION INTRAVENOUS at 11:06

## 2018-06-27 RX ADMIN — LIDOCAINE HYDROCHLORIDE 50 MG: 20 INJECTION, SOLUTION INTRAVENOUS at 11:06

## 2018-06-27 RX ADMIN — GLYCOPYRROLATE 0.2 MG: 0.2 INJECTION, SOLUTION INTRAMUSCULAR; INTRAVENOUS at 11:06

## 2018-06-27 RX ADMIN — PROPOFOL 20 MG: 10 INJECTION, EMULSION INTRAVENOUS at 11:06

## 2018-06-27 RX ADMIN — SODIUM CHLORIDE: 0.9 INJECTION, SOLUTION INTRAVENOUS at 11:06

## 2018-06-27 RX ADMIN — PROPOFOL 30 MG: 10 INJECTION, EMULSION INTRAVENOUS at 11:06

## 2018-06-27 NOTE — ANESTHESIA PREPROCEDURE EVALUATION
"                                                                                                             06/27/2018  Dina Martínez is a 47 y.o., female w epigastric/chest pain & dark stools    PRIOR ANES (in Epic) 20180627 [id number] TAHBSO GA    [mid 2, fent 100X2, prop 200 => ->105]    [sevo, fent 350]    [easy mask; Heath#2 Grade I]  2014128 Colonoscopy MAC    prop 60 -> 100..150 mcg/kg/min    VSS NAAC    ANES-RELATED MED/SURG  Patient Active Problem List   Diagnosis    Family history of colon cancer    Morbid obesity with BMI of 45.0-49.9, adult    Menorrhagia    S/P TONYA-BSO    Sleep apnea    BILL (obstructive sleep apnea)    S/P colonoscopy    Colon polyps    Vitamin D deficiency    Prediabetes     Past Medical History:   Diagnosis Date    Diabetes mellitus     Hypertension      Past Surgical History:   Procedure Laterality Date    ENDOMETRIAL ABLATION      HYSTERECTOMY  11/12/2015    fibroids    HYSTERECTOMY       ALLERGIES  Review of patient's allergies indicates:  No Known Allergies    ANES-RELATED HOME Rx [id number]  Triamterene-hctz Metformin omeprazole    Pre-op Assessment      I have reviewed the Medications.     Review of Systems  Anesthesia Hx:  No problems with previous Anesthesia  History of prior surgery of interest to airway management or planning:  Denies Personal Hx of Anesthesia complications.   Social:  Non-Smoker    Hematology/Oncology:  Hematology Normal   Oncology Normal     EENT/Dental:   No nosebleeds  No loose/false teeth   Cardiovascular:   Exercise tolerance: good Hypertension    Pulmonary:   Denies COPD.  Denies Asthma.  Denies Shortness of breath. Sleep Apnea, CPAP    Renal/:  Renal/ Normal     Hepatic/GI:   Denies Liver Disease. Denies Hepatitis.    OB/GYN/PEDS:  "Not pregnant" [id number]   Neurological:  Neurology Normal    Endocrine:   Diabetes      Wt Readings from Last 1 Encounters:   06/13/18 126.7 kg (279 lb 5.2 oz)     Temp Readings from Last 1 " Encounters:   05/30/18 36.7 °C (98 °F) (Oral)     BP Readings from Last 1 Encounters:   06/13/18 130/84     Pulse Readings from Last 1 Encounters:   06/13/18 87     SpO2 Readings from Last 1 Encounters:   06/13/18 99%       Physical Exam  General:  Well nourished, Morbid Obesity    Airway/Jaw/Neck:  Airway Findings: Mouth Opening: Normal Tongue: Normal  General Airway Assessment: Adult  Mallampati: II  TM Distance: Normal, at least 6 cm  Jaw/Neck Findings:      Dental:  Dental Findings: In tact   Chest/Lungs:  Chest/Lungs Clear           Lab Results   Component Value Date    WBC 6.48 06/14/2018    HGB 13.2 06/14/2018    HCT 39.8 06/14/2018    MCV 81 (L) 06/14/2018     06/14/2018       Chemistry        Component Value Date/Time     06/14/2018 0910    K 4.2 06/14/2018 0910     06/14/2018 0910    CO2 29 06/14/2018 0910    BUN 14 06/14/2018 0910    CREATININE 0.8 06/14/2018 0910     (H) 06/14/2018 0910        Component Value Date/Time    CALCIUM 9.7 06/14/2018 0910    ALKPHOS 89 06/14/2018 0910    AST 12 06/14/2018 0910    ALT 14 06/14/2018 0910    BILITOT 0.3 06/14/2018 0910    ESTGFRAFRICA >60 06/14/2018 0910    EGFRNONAA >60 06/14/2018 0910          Lab Results   Component Value Date    ALBUMIN 3.6 06/14/2018      Lab Results   Component Value Date    TSH 2.312 06/14/2018       CXR      EKG      ECHO      Anesthesia Plan  Type of Anesthesia, risks & benefits discussed:  Anesthesia Type:  MAC  Patient's Preference:   Intra-op Monitoring Plan:   Intra-op Monitoring Plan Comments:   Post Op Pain Control Plan:   Post Op Pain Control Plan Comments:   Induction:   IV  Beta Blocker:         Informed Consent: Patient understands risks and agrees with Anesthesia plan.  Questions answered. Anesthesia consent signed with patient.  ASA Score: 3     Day of Surgery Review of History & Physical:    H&P update referred to the surgeon.         Ready For Surgery From Anesthesia Perspective.

## 2018-06-27 NOTE — [employer] PATIENT INSTRUCTIONS
Discharge Summary/Instructions after an Endoscopic Procedure  Patient Name: Dina Martínez  Patient MRN: [id number]  Patient YOB: 1970 Wednesday, June 27, 2018  Bianca Cooney MD  RESTRICTIONS:  During your procedure today, you received medications for sedation.  These   medications may affect your judgment, balance and coordination.  Therefore,   for 24 hours, you have the following restrictions:   - DO NOT drive a car, operate machinery, make legal/financial decisions,   sign important papers or drink alcohol.    ACTIVITY:  Today: no heavy lifting, straining or running due to procedural   sedation/anesthesia.  The following day: return to full activity including work.  DIET:  Eat and drink normally unless instructed otherwise.     TREATMENT FOR COMMON SIDE EFFECTS:  - Mild abdominal pain, nausea, belching, bloating or excessive gas:  rest,   eat lightly and use a heating pad.  - Sore Throat: treat with throat lozenges and/or gargle with warm salt   water.  - Because air was used during the procedure, expelling large amounts of air   from your rectum or belching is normal.  - If a bowel prep was taken, you may not have a bowel movement for 1-3 days.    This is normal.  SYMPTOMS TO WATCH FOR AND REPORT TO YOUR PHYSICIAN:  1. Abdominal pain or bloating, other than gas cramps.  2. Chest pain.  3. Back pain.  4. Signs of infection such as: chills or fever occurring within 24 hours   after the procedure.  5. Rectal bleeding, which would show as bright red, maroon, or black stools.   (A tablespoon of blood from the rectum is not serious, especially if   hemorrhoids are present.)  6. Vomiting.  7. Weakness or dizziness.  GO DIRECTLY TO THE NEAREST EMERGENCY ROOM IF YOU HAVE ANY OF THE FOLLOWING:      Difficulty breathing              Chills and/or fever over 101 F   Persistent vomiting and/or vomiting blood   Severe abdominal pain   Severe chest pain   Black, tarry stools   Bleeding- more than one  tablespoon   Any other symptom or condition that you feel may need urgent attention  Your doctor recommends these additional instructions:  If any biopsies were taken, your doctors clinic will contact you in 1 to 2   weeks with any results.  - Discharge patient to home.   - Patient has a contact number available for emergencies.  The signs and   symptoms of potential delayed complications were discussed with the   patient.  Return to normal activities tomorrow.  Written discharge   instructions were provided to the patient.   - Resume previous diet.   - Continue present medications.   - Await pathology results.   - Return to my office in 3 weeks.  For questions, problems or results please call your physician - Bianca Cooney MD at Work:  [phone].  EMERGENCY PHONE NUMBER: [phone],  LAB RESULTS: [phone]  IF A COMPLICATION OR EMERGENCY SITUATION ARISES AND YOU ARE UNABLE TO REACH   YOUR PHYSICIAN - GO DIRECTLY TO THE EMERGENCY ROOM.  MD Bianca Sherwood MD  6/27/2018 11:45:03 AM  This report has been verified and signed electronically.  [employer]

## 2018-06-27 NOTE — TRANSFER OF CARE
"Anesthesia Transfer of Care Note    Patient: Dina Martínez    Procedure(s) Performed: Procedure(s) (LRB):  ESOPHAGOGASTRODUODENOSCOPY (EGD) (N/A)    Patient location: GI    Anesthesia Type: MAC    Transport from OR: Transported from OR on room air with adequate spontaneous ventilation    Post pain: adequate analgesia    Post assessment: no apparent anesthetic complications and tolerated procedure well    Post vital signs: stable    Level of consciousness: awake, alert and oriented    Nausea/Vomiting: no nausea/vomiting    Complications: none    Transfer of care protocol was followed      Last vitals:   Visit Vitals  /72   Pulse 73   Temp 37 °C (98.6 °F)   Resp 16   Ht 5' 6" (1.676 m)   Wt 125.2 kg (276 lb)   LMP 10/25/2015   SpO2 100%   Breastfeeding? No   BMI 44.55 kg/m²     "

## 2018-06-27 NOTE — H&P (VIEW-ONLY)
[facility]     CC: Abdominal Pain    HPI 47 y.o. female with history of DM, HTN and obesity, presents with 1.5 weeks of moderate to severe, primarily post-prandial, epigastric abdominal pain, associated with dark, loose stools. Her pain became severe and associated with chest burning, thus she recently presented to the ED and was discharged home with PPI and Carafate after unrevealing labs, troponin and EKG. Her symptoms have somewhat improved with PPI and Carafate (she takes PPI early in AM). She denies dysphagia. She has no prior EGD. Her father had colon cancer and her last colonoscopy was notable for 2 small polyps, performed in 2014. She underwent total abdominal hysterectomy in 2015 for heavy menses. Prior to that time she was told to take iron pills but not since.     Past Medical History:   Diagnosis Date    Diabetes mellitus     Hypertension        Past Surgical History:   Procedure Laterality Date    ENDOMETRIAL ABLATION      HYSTERECTOMY  11/12/2015    fibroids    HYSTERECTOMY         Social History   Substance Use Topics    Smoking status: Never Smoker    Smokeless tobacco: Not on file    Alcohol use 0.6 oz/week     1 Cans of beer per week      Comment: social   -No illicits    Family History   Problem Relation Age of Onset    Colon cancer Father 76    Diabetes Neg Hx     Heart disease Neg Hx        Review of Systems  General ROS: negative for - chills, fever or weight loss  Psychological ROS: negative for - hallucination, depression or suicidal ideation  Ophthalmic ROS: negative for - blurry vision, photophobia or eye pain  ENT ROS: negative for - epistaxis, sore throat or rhinorrhea  Respiratory ROS: no cough, shortness of breath, or wheezing  Cardiovascular ROS: no chest pain or dyspnea on exertion  Gastrointestinal ROS: + epigastric pain, + dark stools, no dypshagia  Genito-Urinary ROS: no dysuria, trouble voiding, or hematuria  Musculoskeletal ROS: negative for - arthralgia,  myalgia, weakness  Neurological ROS: no syncope or seizures; no ataxia  Dermatological ROS: negative for pruritis, rash and jaundice    Physical Examination  /74   Pulse 86   Wt 125.5 kg (276 lb 10.8 oz)   LMP 10/25/2015   BMI 44.66 kg/m²   General appearance: alert, cooperative, no distress  HENT: Normocephalic, atraumatic, neck symmetrical, no nasal discharge   Eyes: conjunctivae/corneas clear, PERRL, EOM's intact, sclera anicteric  Lungs: clear to auscultation bilaterally, no dullness to percussion bilaterally, symmetric expansion, breathing unlabored  Heart: regular rate and rhythm without rub; no displacement of the PMI   Abdomen: obese, mild ttp in epigastrium without rebound or guarding,   Extremities: extremities symmetric; no clubbing, cyanosis, or edema  Integument: Skin color, texture, turgor normal; no rashes; hair distrubution normal, no jaundice  Neurologic: Alert and oriented X 3, no focal sensory or motor neurologic deficits  Psychiatric: no pressured speech; normal affect; no evidence of impaired cognition, no anxiety/depression     Labs:  Lab Results   Component Value Date    WBC 8.08 05/30/2018    HGB 11.8 (L) 05/30/2018    HCT 37.0 05/30/2018    MCV 81 (L) 05/30/2018     05/30/2018     -LFTs -normal    Imaging:  CXR 5/30/18 was independently visualized and reviewed by me and showed clear lungs    Assessment:   47 y.o. female presents with chest pain and epigastric pain for the last several weeks that is worse after eating and associated with dark stools. She also has microcytic anemia.     Plan:  1.Epigastric pain/Chest pain/Dark stools, ? PUD  -Continue Prilosec (instructed to take 30 minutes prior to eating) and Carafate  -Schedule EGD with biopsies for H.pylori (patient prefers [facility] as lives in [place], will attempt to arrange at that facility)  -If EGD unreamrkable and symptoms not improved with PPI will check abdomianl ultrasound as next step  -Avoid  NSAIDs    2.Microcytic Anemia  -Ferritin, Iron, TIBC    Christine Vizcaino MD  [facility]  [address], [address]  [place] [address]  Office: [phone]  Fax: [phone]

## 2018-06-28 ENCOUNTER — TELEPHONE (OUTPATIENT)
Dept: GASTROENTEROLOGY | Facility: CLINIC | Age: 48
End: 2018-06-28

## 2018-06-28 NOTE — TELEPHONE ENCOUNTER
----- Message from Bianca Cooney MD sent at 6/27/2018 11:45 AM CDT -----  Please schedule f/u appt with me in 2-3 weeks

## 2018-07-05 ENCOUNTER — CLINICAL SUPPORT (OUTPATIENT)
Dept: INTERNAL MEDICINE | Facility: CLINIC | Age: 48
End: 2018-07-05
Payer: COMMERCIAL

## 2018-07-05 ENCOUNTER — TELEPHONE (OUTPATIENT)
Dept: INTERNAL MEDICINE | Facility: CLINIC | Age: 48
End: 2018-07-05

## 2018-07-05 VITALS — SYSTOLIC BLOOD PRESSURE: 120 MMHG | HEART RATE: 78 BPM | DIASTOLIC BLOOD PRESSURE: 84 MMHG

## 2018-07-05 PROCEDURE — 99999 PR PBB SHADOW E&M-EST. PATIENT-LVL I: CPT | Mod: PBBFAC,,,

## 2018-07-05 NOTE — TELEPHONE ENCOUNTER
Dina Martínez 47 y.o. female is here today for Blood Pressure check.   History of HTN yes.      Patient verifies taking blood pressure medications. Last dose of blood pressure medication was taken at 8:30 Am. Patient took maxzide 37.5-25 mg.     Does patient have record of home blood pressure readings yes. Readings have been averaging 120's/80's.     Patient is asymptomatic. Patient denies Headache, chest pain, blurred vision, left arm pain, right arm pain, leg pain, and/or swelling in the ankles or feet. Patient has no complaints today.     Blood pressure reading was 120/84, Pulse 78.     Pt's Home blood pressure machine read 114/83, Pulse 78.     Pt's Home blood pressure machine is accurate. Patient's blood pressure machine is off by 1 point on the top.

## 2018-07-05 NOTE — TELEPHONE ENCOUNTER
Informed pt of Dr. Hong advice. Pt verbalized understanding. Pt states that she is going to try diet and exercise and see if her BP improves for now. Pt has no further questions or concerns.

## 2018-07-05 NOTE — TELEPHONE ENCOUNTER
Her blood pressure is almost to goal.   I believe we could get her to her goal with some increased exercise and dietary changes.  Please find out if she is willing to implement 30 min of brisk activity 5 days a week along with following a Mediterranean style diet. If we need to add another medication I would start low dose amlodipine.

## 2018-07-18 ENCOUNTER — OFFICE VISIT (OUTPATIENT)
Dept: GASTROENTEROLOGY | Facility: CLINIC | Age: 48
End: 2018-07-18
Payer: COMMERCIAL

## 2018-07-18 VITALS
HEIGHT: 66 IN | BODY MASS INDEX: 44.2 KG/M2 | DIASTOLIC BLOOD PRESSURE: 78 MMHG | WEIGHT: 275 LBS | SYSTOLIC BLOOD PRESSURE: 123 MMHG | HEART RATE: 75 BPM

## 2018-07-18 DIAGNOSIS — Z86.010 PERSONAL HISTORY OF COLONIC POLYPS: ICD-10-CM

## 2018-07-18 DIAGNOSIS — Z80.0 FAMILY HISTORY OF COLON CANCER: ICD-10-CM

## 2018-07-18 DIAGNOSIS — R10.13 EPIGASTRIC PAIN: Primary | ICD-10-CM

## 2018-07-18 DIAGNOSIS — R11.0 NAUSEA: ICD-10-CM

## 2018-07-18 DIAGNOSIS — K21.9 GASTROESOPHAGEAL REFLUX DISEASE WITHOUT ESOPHAGITIS: ICD-10-CM

## 2018-07-18 PROBLEM — Z86.0100 PERSONAL HISTORY OF COLONIC POLYPS: Status: ACTIVE | Noted: 2018-07-18

## 2018-07-18 PROBLEM — K63.5 COLON POLYPS: Status: RESOLVED | Noted: 2017-06-12 | Resolved: 2018-07-18

## 2018-07-18 PROBLEM — Z98.890 S/P COLONOSCOPY: Status: RESOLVED | Noted: 2017-06-12 | Resolved: 2018-07-18

## 2018-07-18 PROCEDURE — 99214 OFFICE O/P EST MOD 30 MIN: CPT | Mod: S$GLB,,, | Performed by: INTERNAL MEDICINE

## 2018-07-18 PROCEDURE — 99999 PR PBB SHADOW E&M-EST. PATIENT-LVL III: CPT | Mod: PBBFAC,,, | Performed by: INTERNAL MEDICINE

## 2018-07-18 RX ORDER — SUCRALFATE 1 G/1
1 TABLET ORAL 4 TIMES DAILY
Qty: 120 TABLET | Refills: 6 | Status: SHIPPED | OUTPATIENT
Start: 2018-07-18 | End: 2018-10-03

## 2018-07-19 ENCOUNTER — TELEPHONE (OUTPATIENT)
Dept: GASTROENTEROLOGY | Facility: CLINIC | Age: 48
End: 2018-07-19

## 2018-07-19 NOTE — TELEPHONE ENCOUNTER
Patient stated from her office visit an she stated she was going to give the office a call back in regards to scheduling an Colonoscopy.

## 2018-07-24 ENCOUNTER — TELEPHONE (OUTPATIENT)
Dept: GASTROENTEROLOGY | Facility: CLINIC | Age: 48
End: 2018-07-24

## 2018-07-24 NOTE — TELEPHONE ENCOUNTER
Spoke with patient an all questions was answered. Patient stated that she will schedule procedure around her work schedule.

## 2018-07-24 NOTE — TELEPHONE ENCOUNTER
----- Message from Falguni Salter sent at 7/24/2018  8:37 AM CDT -----  Contact: pt  Pt called to speak to the nurse to schedule a Gastro Emptying Study and would like a call back.    Pt can be reacheed at [phone].    Thanks

## 2018-07-30 ENCOUNTER — HOSPITAL ENCOUNTER (OUTPATIENT)
Dept: RADIOLOGY | Facility: HOSPITAL | Age: 48
Discharge: HOME | End: 2018-07-30
Attending: INTERNAL MEDICINE
Payer: COMMERCIAL

## 2018-07-30 DIAGNOSIS — R11.0 NAUSEA: ICD-10-CM

## 2018-07-30 DIAGNOSIS — K21.9 GASTROESOPHAGEAL REFLUX DISEASE WITHOUT ESOPHAGITIS: ICD-10-CM

## 2018-07-30 PROCEDURE — 78264 GASTRIC EMPTYING IMG STUDY: CPT | Mod: TC

## 2018-07-30 PROCEDURE — 78264 GASTRIC EMPTYING IMG STUDY: CPT | Mod: 26,,, | Performed by: RADIOLOGY

## 2018-07-30 PROCEDURE — A9541 TC99M SULFUR COLLOID: HCPCS

## 2018-08-01 ENCOUNTER — TELEPHONE (OUTPATIENT)
Dept: GASTROENTEROLOGY | Facility: CLINIC | Age: 48
End: 2018-08-01

## 2018-08-01 NOTE — TELEPHONE ENCOUNTER
----- Message from Bianca Cooney MD sent at 7/31/2018  8:12 AM CDT -----  Gastric emptying study is normal.  How is she feeling?  Keep cscope appt already ordered.

## 2018-08-01 NOTE — TELEPHONE ENCOUNTER
Patient was notified of test results, pt stated that she will give the office a call back at a later date to schedule Colonoscopy.

## 2018-08-03 ENCOUNTER — TELEPHONE (OUTPATIENT)
Dept: GASTROENTEROLOGY | Facility: CLINIC | Age: 48
End: 2018-08-03

## 2018-08-03 NOTE — TELEPHONE ENCOUNTER
----- Message from Jada Humphries sent at 8/3/2018  9:29 AM CDT -----  Contact: [phone]/self.  Patient is requesting a call back regarding 8/22 is fine for her procedure. Please call and advise.

## 2018-08-03 NOTE — TELEPHONE ENCOUNTER
Patient stated that she will give the office a call back in regards to scheduling a Colonoscopy on 8/22/18. Patient stated she will call back on 8/7/18 because she is out of town.

## 2018-08-07 ENCOUNTER — TELEPHONE (OUTPATIENT)
Dept: GASTROENTEROLOGY | Facility: CLINIC | Age: 48
End: 2018-08-07

## 2018-08-07 NOTE — TELEPHONE ENCOUNTER
----- Message from Rachael Castillo sent at 8/7/2018  9:31 AM CDT -----  Contact: [phone]/self  Pt states she's returning your call   Please call and advise

## 2018-08-16 ENCOUNTER — TELEPHONE (OUTPATIENT)
Dept: GASTROENTEROLOGY | Facility: CLINIC | Age: 48
End: 2018-08-16

## 2018-08-16 NOTE — TELEPHONE ENCOUNTER
----- Message from Yina Flores sent at 8/16/2018  9:40 AM CDT -----  Contact: [phone]  Patient advised she did not receive the instructions for the prep kit for her upcoming colonoscopy. Please call and advise.

## 2018-08-22 ENCOUNTER — HOSPITAL ENCOUNTER (OUTPATIENT)
Facility: HOSPITAL | Age: 48
Discharge: HOME | End: 2018-08-22
Attending: INTERNAL MEDICINE | Admitting: INTERNAL MEDICINE
Payer: COMMERCIAL

## 2018-08-22 ENCOUNTER — ANESTHESIA (OUTPATIENT)
Dept: ENDOSCOPY | Facility: HOSPITAL | Age: 48
End: 2018-08-22
Payer: COMMERCIAL

## 2018-08-22 ENCOUNTER — ANESTHESIA EVENT (OUTPATIENT)
Dept: ENDOSCOPY | Facility: HOSPITAL | Age: 48
End: 2018-08-22
Payer: COMMERCIAL

## 2018-08-22 VITALS
WEIGHT: 275 LBS | HEIGHT: 67 IN | OXYGEN SATURATION: 98 % | TEMPERATURE: 99 F | HEART RATE: 81 BPM | SYSTOLIC BLOOD PRESSURE: 123 MMHG | DIASTOLIC BLOOD PRESSURE: 81 MMHG | RESPIRATION RATE: 16 BRPM | BODY MASS INDEX: 43.16 KG/M2

## 2018-08-22 DIAGNOSIS — Z80.0 FAMILY HISTORY OF COLON CANCER: ICD-10-CM

## 2018-08-22 DIAGNOSIS — Z86.010 PERSONAL HISTORY OF COLONIC POLYPS: Primary | ICD-10-CM

## 2018-08-22 PROCEDURE — 37000009 HC ANESTHESIA EA ADD 15 MINS: Performed by: INTERNAL MEDICINE

## 2018-08-22 PROCEDURE — G0105 COLORECTAL SCRN; HI RISK IND: HCPCS | Performed by: INTERNAL MEDICINE

## 2018-08-22 PROCEDURE — 63600175 PHARM REV CODE 636 W HCPCS: Performed by: NURSE ANESTHETIST, CERTIFIED REGISTERED

## 2018-08-22 PROCEDURE — G0105 COLORECTAL SCRN; HI RISK IND: HCPCS | Mod: ,,, | Performed by: INTERNAL MEDICINE

## 2018-08-22 PROCEDURE — 37000008 HC ANESTHESIA 1ST 15 MINUTES: Performed by: INTERNAL MEDICINE

## 2018-08-22 PROCEDURE — 25000003 PHARM REV CODE 250: Performed by: INTERNAL MEDICINE

## 2018-08-22 RX ORDER — PROPOFOL 10 MG/ML
VIAL (ML) INTRAVENOUS
Status: DISCONTINUED | OUTPATIENT
Start: 2018-08-22 | End: 2018-08-22

## 2018-08-22 RX ORDER — SODIUM CHLORIDE 9 MG/ML
INJECTION, SOLUTION INTRAVENOUS CONTINUOUS
Status: DISCONTINUED | OUTPATIENT
Start: 2018-08-22 | End: 2018-08-22 | Stop reason: HOSPADM

## 2018-08-22 RX ORDER — LIDOCAINE HCL/PF 100 MG/5ML
SYRINGE (ML) INTRAVENOUS
Status: DISCONTINUED | OUTPATIENT
Start: 2018-08-22 | End: 2018-08-22

## 2018-08-22 RX ORDER — PROPOFOL 10 MG/ML
VIAL (ML) INTRAVENOUS CONTINUOUS PRN
Status: DISCONTINUED | OUTPATIENT
Start: 2018-08-22 | End: 2018-08-22

## 2018-08-22 RX ORDER — SODIUM CHLORIDE 0.9 % (FLUSH) 0.9 %
3 SYRINGE (ML) INJECTION
Status: DISCONTINUED | OUTPATIENT
Start: 2018-08-22 | End: 2018-08-22 | Stop reason: HOSPADM

## 2018-08-22 RX ADMIN — LIDOCAINE HYDROCHLORIDE 40 MG: 20 INJECTION, SOLUTION INTRAVENOUS at 11:08

## 2018-08-22 RX ADMIN — PROPOFOL 150 MCG/KG/MIN: 10 INJECTION, EMULSION INTRAVENOUS at 11:08

## 2018-08-22 RX ADMIN — SODIUM CHLORIDE: 0.9 INJECTION, SOLUTION INTRAVENOUS at 10:08

## 2018-08-22 RX ADMIN — PROPOFOL 50 MG: 10 INJECTION, EMULSION INTRAVENOUS at 11:08

## 2018-08-22 NOTE — PLAN OF CARE
Admission process complete. Patient ready for procedure. Plan of care reviewed with patient. Preoperative fasting appropriate for procedure and sedation. Call light in reach and bed in lowest position.

## 2018-08-22 NOTE — [employer] PATIENT INSTRUCTIONS
Discharge Summary/Instructions after an Endoscopic Procedure  Patient Name: Dina Martínez  Patient MRN: [id number]  Patient YOB: 1970 Wednesday, August 22, 2018  Bianca Cooney MD  RESTRICTIONS:  During your procedure today, you received medications for sedation.  These   medications may affect your judgment, balance and coordination.  Therefore,   for 24 hours, you have the following restrictions:   - DO NOT drive a car, operate machinery, make legal/financial decisions,   sign important papers or drink alcohol.    ACTIVITY:  Today: no heavy lifting, straining or running due to procedural   sedation/anesthesia.  The following day: return to full activity including work.  DIET:  Eat and drink normally unless instructed otherwise.     TREATMENT FOR COMMON SIDE EFFECTS:  - Mild abdominal pain, nausea, belching, bloating or excessive gas:  rest,   eat lightly and use a heating pad.  - Sore Throat: treat with throat lozenges and/or gargle with warm salt   water.  - Because air was used during the procedure, expelling large amounts of air   from your rectum or belching is normal.  - If a bowel prep was taken, you may not have a bowel movement for 1-3 days.    This is normal.  SYMPTOMS TO WATCH FOR AND REPORT TO YOUR PHYSICIAN:  1. Abdominal pain or bloating, other than gas cramps.  2. Chest pain.  3. Back pain.  4. Signs of infection such as: chills or fever occurring within 24 hours   after the procedure.  5. Rectal bleeding, which would show as bright red, maroon, or black stools.   (A tablespoon of blood from the rectum is not serious, especially if   hemorrhoids are present.)  6. Vomiting.  7. Weakness or dizziness.  GO DIRECTLY TO THE NEAREST EMERGENCY ROOM IF YOU HAVE ANY OF THE FOLLOWING:      Difficulty breathing              Chills and/or fever over 101 F   Persistent vomiting and/or vomiting blood   Severe abdominal pain   Severe chest pain   Black, tarry stools   Bleeding- more than one  tablespoon   Any other symptom or condition that you feel may need urgent attention  Your doctor recommends these additional instructions:  If any biopsies were taken, your doctors clinic will contact you in 1 to 2   weeks with any results.  - Discharge patient to home.   - Patient has a contact number available for emergencies.  The signs and   symptoms of potential delayed complications were discussed with the   patient.  Return to normal activities tomorrow.  Written discharge   instructions were provided to the patient.   - Resume previous diet.   - Continue present medications.   - Repeat colonoscopy in 5 years for screening purposes.   - Return to GI clinic PRN.  For questions, problems or results please call your physician - Bianca Cooney MD at Work:  [phone].  EMERGENCY PHONE NUMBER: [phone],  LAB RESULTS: [phone]  IF A COMPLICATION OR EMERGENCY SITUATION ARISES AND YOU ARE UNABLE TO REACH   YOUR PHYSICIAN - GO DIRECTLY TO THE EMERGENCY ROOM.  MD Bianca Sherwood MD  8/22/2018 11:45:18 AM  This report has been verified and signed electronically.  [employer]

## 2018-08-22 NOTE — H&P
CC: Personal h/o colon polyps, FH of colon cancer    47 year old F with personal h/o polyps and FH of colon cancer.    ROS:  No headache, no fever/chills, no chest pain/SOB, no nausea/vomiting/diarrhea/constipation/GI bleeding/abdominal pain, no dysuria/hematuria.    VSSAF   Exam:   Alert and oriented x 3; no apparent distress   PERRLA, sclera anicteric  CV: Regular rate/rhythm, normal PMI   Lungs: Clear bilaterally with no wheeze/rales   Abdomen: Soft, NT/ND, normal bowel sounds   Ext: No cyanosis, clubbing     Impression:   Personal h/o colon polyps, FH of colon cancer.    Plan:   Proceed with endoscopy. Further recs to follow.

## 2018-08-22 NOTE — PLAN OF CARE
Patient meets criteria for discharge. Instrucutions given patient and spouse verbalize understanding. Patient discharged home with her spouse.

## 2018-08-22 NOTE — ANESTHESIA POSTPROCEDURE EVALUATION
"Anesthesia Post Evaluation    Patient: Dina Martínez    Procedure(s) Performed: Procedure(s) (LRB):  COLONOSCOPY (N/A)    Final Anesthesia Type: MAC  Patient location during evaluation: GI PACU  Patient participation: Yes- Able to Participate  Level of consciousness: awake and alert and oriented  Post-procedure vital signs: reviewed and stable  Pain management: adequate  Airway patency: patent  PONV status at discharge: No PONV  Anesthetic complications: no      Cardiovascular status: blood pressure returned to baseline and hemodynamically stable  Respiratory status: unassisted, spontaneous ventilation and room air  Hydration status: euvolemic  Follow-up not needed.        Visit Vitals  /81 (BP Location: Left arm, Patient Position: Lying)   Pulse 81   Temp 37 °C (98.6 °F) (Tympanic)   Resp 16   Ht 5' 7" (1.702 m)   Wt 124.7 kg (275 lb)   LMP 10/25/2015   SpO2 98%   Breastfeeding? No   BMI 43.07 kg/m²       Pain/Barrett Score: Pain Assessment Performed: Yes (8/22/2018  9:59 AM)  Presence of Pain: denies (8/22/2018  9:59 AM)        "

## 2018-08-22 NOTE — ANESTHESIA PREPROCEDURE EVALUATION
08/22/2018  Dina Martínez is a 47 y.o., female having lower endo hx of polyps.  HTN.    Anesthesia Evaluation    I have reviewed the Patient Summary Reports.    I have reviewed the Nursing Notes.   I have reviewed the Medications.     Review of Systems  Anesthesia Hx:  No problems with previous Anesthesia   Denies Personal Hx of Anesthesia complications.   Social:  Non-Smoker    Cardiovascular:   Hypertension    Endocrine:   Diabetes        Physical Exam  General:  Well nourished    Airway/Jaw/Neck:  Airway Findings: Mouth Opening: Normal Tongue: Normal  General Airway Assessment: Adult  Mallampati: II  Improves to I with phonation.  TM Distance: Normal, at least 6 cm  Jaw/Neck Findings:  Neck ROM: Normal ROM       Chest/Lungs:  Chest/Lungs Findings: Clear to auscultation, Normal Respiratory Rate     Heart/Vascular:  Heart Findings: Rate: Normal  Rhythm: Regular Rhythm  Sounds: Normal        Mental Status:  Mental Status Findings:  Alert and Oriented         Anesthesia Plan  Type of Anesthesia, risks & benefits discussed:  Anesthesia Type:  MAC  Patient's Preference:   Intra-op Monitoring Plan:   Intra-op Monitoring Plan Comments:   Post Op Pain Control Plan:   Post Op Pain Control Plan Comments:   Induction:   IV  Beta Blocker:  Patient is not currently on a Beta-Blocker (No further documentation required).       Informed Consent: Patient understands risks and agrees with Anesthesia plan.  Questions answered. Anesthesia consent signed with patient.  ASA Score: 2     Day of Surgery Review of History & Physical:            Ready For Surgery From Anesthesia Perspective.

## 2018-10-03 ENCOUNTER — HOSPITAL ENCOUNTER (EMERGENCY)
Facility: HOSPITAL | Age: 48
Discharge: HOME | End: 2018-10-03
Attending: EMERGENCY MEDICINE
Payer: COMMERCIAL

## 2018-10-03 VITALS
TEMPERATURE: 98 F | HEART RATE: 84 BPM | RESPIRATION RATE: 16 BRPM | SYSTOLIC BLOOD PRESSURE: 139 MMHG | WEIGHT: 270 LBS | DIASTOLIC BLOOD PRESSURE: 89 MMHG | HEIGHT: 66 IN | BODY MASS INDEX: 43.39 KG/M2 | OXYGEN SATURATION: 100 %

## 2018-10-03 DIAGNOSIS — M25.561 RIGHT KNEE PAIN: ICD-10-CM

## 2018-10-03 PROCEDURE — 99284 EMERGENCY DEPT VISIT MOD MDM: CPT | Mod: 25

## 2018-10-03 PROCEDURE — 63600175 PHARM REV CODE 636 W HCPCS: Performed by: PHYSICIAN ASSISTANT

## 2018-10-03 PROCEDURE — 25000003 PHARM REV CODE 250: Performed by: PHYSICIAN ASSISTANT

## 2018-10-03 PROCEDURE — 96372 THER/PROPH/DIAG INJ SC/IM: CPT

## 2018-10-03 RX ORDER — KETOROLAC TROMETHAMINE 30 MG/ML
30 INJECTION, SOLUTION INTRAMUSCULAR; INTRAVENOUS
Status: COMPLETED | OUTPATIENT
Start: 2018-10-03 | End: 2018-10-03

## 2018-10-03 RX ORDER — HYDROCODONE BITARTRATE AND ACETAMINOPHEN 5; 325 MG/1; MG/1
1 TABLET ORAL
Status: COMPLETED | OUTPATIENT
Start: 2018-10-03 | End: 2018-10-03

## 2018-10-03 RX ORDER — HYDROCODONE BITARTRATE AND ACETAMINOPHEN 5; 325 MG/1; MG/1
1 TABLET ORAL EVERY 4 HOURS PRN
Qty: 6 TABLET | Refills: 0 | Status: SHIPPED | OUTPATIENT
Start: 2018-10-03 | End: 2019-08-12

## 2018-10-03 RX ORDER — NAPROXEN 500 MG/1
500 TABLET ORAL 2 TIMES DAILY WITH MEALS
Qty: 14 TABLET | Refills: 0 | Status: SHIPPED | OUTPATIENT
Start: 2018-10-03 | End: 2019-07-25

## 2018-10-03 RX ADMIN — HYDROCODONE BITARTRATE AND ACETAMINOPHEN 1 TABLET: 5; 325 TABLET ORAL at 10:10

## 2018-10-03 RX ADMIN — KETOROLAC TROMETHAMINE 30 MG: 30 INJECTION, SOLUTION INTRAMUSCULAR at 09:10

## 2018-10-03 NOTE — ED PROVIDER NOTES
Encounter Date: 10/3/2018       History     Chief Complaint   Patient presents with    Knee Pain     rt knee pain while working out yesterday on bike, n+ swelling and difficulty bearing wt.      Dina Martínez, a 48 y.o. female that presents to the ED for evaluation of right knee pain that started acutely after she was doing leg lifts yesterday while working out.  Patient states that the pain has been constant since that time, worse with movement.  Sensation of swelling to knee.  No history of surgeries to knee.  Treatments tried include ice and tylenol with little improvement.  No fevers.            The history is provided by the patient.     Review of patient's allergies indicates:  No Known Allergies  Past Medical History:   Diagnosis Date    Diabetes mellitus     Hypertension      Past Surgical History:   Procedure Laterality Date    COLONOSCOPY N/A 8/22/2018    Procedure: COLONOSCOPY;  Surgeon: Bianca Cooney MD;  Location: [facility];  Service: Endoscopy;  Laterality: N/A;    COLONOSCOPY N/A 8/22/2018    Performed by Bianca Cooney MD at [facility]    COLONOSCOPY N/A 10/28/2014    Performed [facility] at [facility] ([facility]    CYSTOSCOPY N/A 11/12/2015    Performed by Drea Yoon [facility] at [facility] OR    ENDOMETRIAL ABLATION      ESOPHAGOGASTRODUODENOSCOPY N/A 6/27/2018    Procedure: ESOPHAGOGASTRODUODENOSCOPY (EGD);  Surgeon: Bianca Cooney MD;  Location: [facility];  Service: Endoscopy;  Laterality: N/A;    ESOPHAGOGASTRODUODENOSCOPY (EGD) N/A 6/27/2018    Performed by Bianca Cooney MD at [facility]    HYSTERECTOMY  11/12/2015    fibroids    HYSTERECTOMY      HYSTERECTOMY-TOTAL-ABDOMINAL AND BSO N/A 11/12/2015    Performed by Drea Yoon MD at [facility] OR     Family History   Problem Relation Age of Onset    Colon cancer Father 76    Diabetes Neg Hx     Heart disease Neg Hx      Social History     Tobacco Use    Smoking status: Never Smoker   Substance  Use Topics    Alcohol use: Yes     Alcohol/week: 0.6 oz     Types: 1 Cans of beer per week     Comment: social    Drug use: No     Review of Systems   Constitutional: Negative for fever.   Musculoskeletal: Positive for arthralgias (right knee pain ) and joint swelling.   Skin: Negative for color change and wound.   Neurological: Negative for numbness.   All other systems reviewed and are negative.      Physical Exam     Initial Vitals [10/03/18 0826]   BP Pulse Resp Temp SpO2   (!) 154/82 84 16 98.3 °F (36.8 °C) 100 %      MAP       --         Physical Exam    Nursing note and vitals reviewed.  Constitutional: She appears well-developed and well-nourished. She is not diaphoretic. No distress.   HENT:   Head: Normocephalic and atraumatic.   Right Ear: External ear normal.   Left Ear: External ear normal.   Nose: Nose normal.   Eyes: Conjunctivae and EOM are normal. Left eye exhibits no discharge.   Neck: Normal range of motion.   Cardiovascular: Normal rate and regular rhythm.   Pulmonary/Chest: Breath sounds normal.   Musculoskeletal:        Right hip: Normal.        Right knee: She exhibits decreased range of motion and swelling. She exhibits no effusion, no ecchymosis, no deformity, no laceration, no erythema, normal alignment, no LCL laxity, normal patellar mobility, no bony tenderness, normal meniscus and no MCL laxity. Tenderness (anterior knee ) found. No medial joint line, no lateral joint line, no MCL, no LCL and no patellar tendon tenderness noted.        Right ankle: Normal.   Right knee: increased pain with varus and Angeles's testing.  No laxity noted with varus, valgus, anterior, posterior drawer    Neurological: She is alert and oriented to person, place, and time.   Skin: Skin is warm and dry. Capillary refill takes less than 2 seconds. No erythema.   Psychiatric: She has a normal mood and affect. Thought content normal.         ED Course   Procedures  Labs Reviewed - No data to display        Imaging Results          X-Ray Knee 3 View Right (Final result)  Result time 10/03/18 10:28:28    Final result by Zen Srivastava MD (10/03/18 10:28:28)                 Impression:      DJD right knee      Electronically signed by: Zen Srivastava MD  Date:    10/03/2018  Time:    10:28             Narrative:    EXAMINATION:  XR KNEE 3 VIEW RIGHT    CLINICAL HISTORY:  Pain in right knee    TECHNIQUE:  AP, lateral, and Merchant views of the right knee were performed.    COMPARISON:  06/14/2018    FINDINGS:  The skeletal structures at the right knee are intact.  No fracture or dislocation is identified.  Degenerative joint disease is again identified with spurring at multiple positions and mild narrowing of the medial tibiofemoral joint space.  Small joint effusion is present.  No focal soft tissue swelling is detected.                                 Medical Decision Making:   Initial Assessment:   Acute right knee pain after working out   Differential Diagnosis:   Internal derangement of knee, sprain/strain, fracture   Clinical Tests:   Radiological Study: Ordered and Reviewed  ED Management:  Patient presents to ED for evaluation of acute onset of right knee pain after a workout yesterday.  No history of surgeries to the site.  Patient has flexion of knee greater than 90° with no obvious effusion, warmth or erythema present.  Patient has increased pain with varus testing.  No ligamentous laxity noted.  X-ray shows evidence of arthritis.  Toradol and Norco were given.  Ace wrap was placed and crutches provided.  Symptoms and exam correlate with muscular strain but there is concern for an internal derangement of knee.  Patient was given information on symptomatic control including RICE protocol and use of anti-inflammatories.  She was given a course of Norco to take only at night and was given driving precautions.  She was instructed to follow up with PCP or orthopedist for further evaluation and possible  MRI if no improvement with conservative interventions.  She verbalized understanding and is in agreement with plan.                     Clinical Impression:   The encounter diagnosis was Right knee pain.                             Ximena Childress PA-C  10/03/18 1195

## 2018-11-23 ENCOUNTER — PATIENT MESSAGE (OUTPATIENT)
Dept: INTERNAL MEDICINE | Facility: CLINIC | Age: 48
End: 2018-11-23

## 2018-11-28 ENCOUNTER — PATIENT MESSAGE (OUTPATIENT)
Dept: INTERNAL MEDICINE | Facility: CLINIC | Age: 48
End: 2018-11-28

## 2019-01-08 ENCOUNTER — PATIENT MESSAGE (OUTPATIENT)
Dept: INTERNAL MEDICINE | Facility: CLINIC | Age: 49
End: 2019-01-08

## 2019-01-08 DIAGNOSIS — Z12.39 SCREENING FOR BREAST CANCER: Primary | ICD-10-CM

## 2019-01-11 ENCOUNTER — HOSPITAL ENCOUNTER (OUTPATIENT)
Dept: RADIOLOGY | Facility: HOSPITAL | Age: 49
Discharge: HOME | End: 2019-01-11
Attending: FAMILY MEDICINE
Payer: COMMERCIAL

## 2019-01-11 VITALS — BODY MASS INDEX: 43.39 KG/M2 | WEIGHT: 270 LBS | HEIGHT: 66 IN

## 2019-01-11 DIAGNOSIS — Z12.39 SCREENING FOR BREAST CANCER: ICD-10-CM

## 2019-01-11 PROCEDURE — 77063 MAMMO DIGITAL SCREENING BILAT WITH TOMOSYNTHESIS_CAD: ICD-10-PCS | Mod: 26,,, | Performed by: RADIOLOGY

## 2019-01-11 PROCEDURE — 77067 SCR MAMMO BI INCL CAD: CPT | Mod: TC

## 2019-01-11 PROCEDURE — 77063 BREAST TOMOSYNTHESIS BI: CPT | Mod: 26,,, | Performed by: RADIOLOGY

## 2019-01-11 PROCEDURE — 77067 SCR MAMMO BI INCL CAD: CPT | Mod: 26,,, | Performed by: RADIOLOGY

## 2019-01-11 PROCEDURE — 77067 MAMMO DIGITAL SCREENING BILAT WITH TOMOSYNTHESIS_CAD: ICD-10-PCS | Mod: 26,,, | Performed by: RADIOLOGY

## 2019-01-24 ENCOUNTER — PATIENT MESSAGE (OUTPATIENT)
Dept: INTERNAL MEDICINE | Facility: CLINIC | Age: 49
End: 2019-01-24

## 2019-01-24 DIAGNOSIS — I10 ESSENTIAL HYPERTENSION: ICD-10-CM

## 2019-01-24 DIAGNOSIS — R73.03 PREDIABETES: Primary | ICD-10-CM

## 2019-01-24 RX ORDER — TRIAMTERENE/HYDROCHLOROTHIAZID 37.5-25 MG
1 TABLET ORAL DAILY
Qty: 90 TABLET | Refills: 0 | Status: SHIPPED | OUTPATIENT
Start: 2019-01-24 | End: 2019-02-14 | Stop reason: SDUPTHER

## 2019-01-30 ENCOUNTER — TELEPHONE (OUTPATIENT)
Dept: INTERNAL MEDICINE | Facility: CLINIC | Age: 49
End: 2019-01-30

## 2019-01-31 DIAGNOSIS — Z12.39 SCREENING FOR BREAST CANCER: Primary | ICD-10-CM

## 2019-02-04 ENCOUNTER — PATIENT MESSAGE (OUTPATIENT)
Dept: INTERNAL MEDICINE | Facility: CLINIC | Age: 49
End: 2019-02-04

## 2019-02-04 RX ORDER — ACYCLOVIR 50 MG/G
OINTMENT TOPICAL
Status: CANCELLED | OUTPATIENT
Start: 2019-02-04

## 2019-02-05 ENCOUNTER — OFFICE VISIT (OUTPATIENT)
Dept: INTERNAL MEDICINE | Facility: CLINIC | Age: 49
End: 2019-02-05
Payer: COMMERCIAL

## 2019-02-05 VITALS
DIASTOLIC BLOOD PRESSURE: 71 MMHG | OXYGEN SATURATION: 98 % | WEIGHT: 274.5 LBS | TEMPERATURE: 98 F | BODY MASS INDEX: 43.08 KG/M2 | SYSTOLIC BLOOD PRESSURE: 121 MMHG | HEART RATE: 73 BPM | HEIGHT: 67 IN

## 2019-02-05 DIAGNOSIS — B00.9 HSV-1 (HERPES SIMPLEX VIRUS 1) INFECTION: ICD-10-CM

## 2019-02-05 DIAGNOSIS — J06.9 ACUTE URI: Primary | ICD-10-CM

## 2019-02-05 PROCEDURE — 99213 OFFICE O/P EST LOW 20 MIN: CPT | Mod: S$GLB,,, | Performed by: FAMILY MEDICINE

## 2019-02-05 PROCEDURE — 3074F PR MOST RECENT SYSTOLIC BLOOD PRESSURE < 130 MM HG: ICD-10-PCS | Mod: CPTII,S$GLB,, | Performed by: FAMILY MEDICINE

## 2019-02-05 PROCEDURE — 99999 PR PBB SHADOW E&M-EST. PATIENT-LVL III: CPT | Mod: PBBFAC,,, | Performed by: FAMILY MEDICINE

## 2019-02-05 PROCEDURE — 3074F SYST BP LT 130 MM HG: CPT | Mod: CPTII,S$GLB,, | Performed by: FAMILY MEDICINE

## 2019-02-05 PROCEDURE — 3008F BODY MASS INDEX DOCD: CPT | Mod: CPTII,S$GLB,, | Performed by: FAMILY MEDICINE

## 2019-02-05 PROCEDURE — 3008F PR BODY MASS INDEX (BMI) DOCUMENTED: ICD-10-PCS | Mod: CPTII,S$GLB,, | Performed by: FAMILY MEDICINE

## 2019-02-05 PROCEDURE — 99213 PR OFFICE/OUTPT VISIT, EST, LEVL III, 20-29 MIN: ICD-10-PCS | Mod: S$GLB,,, | Performed by: FAMILY MEDICINE

## 2019-02-05 PROCEDURE — 3078F DIAST BP <80 MM HG: CPT | Mod: CPTII,S$GLB,, | Performed by: FAMILY MEDICINE

## 2019-02-05 PROCEDURE — 3078F PR MOST RECENT DIASTOLIC BLOOD PRESSURE < 80 MM HG: ICD-10-PCS | Mod: CPTII,S$GLB,, | Performed by: FAMILY MEDICINE

## 2019-02-05 PROCEDURE — 99999 PR PBB SHADOW E&M-EST. PATIENT-LVL III: ICD-10-PCS | Mod: PBBFAC,,, | Performed by: FAMILY MEDICINE

## 2019-02-05 RX ORDER — ACYCLOVIR 400 MG/1
400 TABLET ORAL 3 TIMES DAILY
Qty: 30 TABLET | Refills: 2 | Status: SHIPPED | OUTPATIENT
Start: 2019-02-05 | End: 2019-02-14

## 2019-02-05 RX ORDER — ACYCLOVIR 50 MG/G
CREAM TOPICAL
Qty: 5 G | Refills: 1 | Status: SHIPPED | OUTPATIENT
Start: 2019-02-05 | End: 2020-02-19

## 2019-02-05 NOTE — PROGRESS NOTES
"[facility]  Clinic Note      Subjective:       Patient ID: Dina Martínez is a 48 y.o. female.    Chief Complaint: Fever and Sore Throat    Patient is here today for a sick visit.  She has been feeling ill for the last 3 days, with initial onset of fever and constitutional symptoms including malaise, fatigue, chills, myalgias, and headache.  She has also had mild respiratory symptoms, including nasal congestion, rhinorrhea, and cough.   She was evaluated at the ER on the day of her symptom onset, and testing was negative for both influenza and Strep.  Her chest XR was clear.  She notes continued chills, but her fever has resolved.  She now has had recurrence of a blister on her lip, along with associated pain and swelling.  This had prior been treated as an HSV lesion, and presentation is again consistent.      URI    This is a new problem. Episode onset: 3 days. The problem has been gradually improving. The fever has been present for 1 to 2 days. Associated symptoms include congestion, coughing, diarrhea, headaches, nausea and rhinorrhea. Pertinent negatives include no abdominal pain, chest pain, ear pain, sinus pain, sore throat, vomiting or wheezing. She has tried decongestant for the symptoms. The treatment provided mild relief.     Review of Systems   Constitutional: Positive for activity change, chills and fatigue.   HENT: Positive for congestion and rhinorrhea. Negative for ear pain, sinus pain and sore throat.    Respiratory: Positive for cough. Negative for shortness of breath and wheezing.    Cardiovascular: Negative for chest pain and palpitations.   Gastrointestinal: Positive for diarrhea and nausea. Negative for abdominal pain and vomiting.   Neurological: Positive for headaches. Negative for dizziness.       Objective:      /71   Pulse 73   Temp 97.9 °F (36.6 °C)   Ht 5' 7" (1.702 m)   Wt 124.5 kg (274 lb 7.6 oz)   LMP 10/25/2015   SpO2 98%   BMI 42.99 kg/m²   Physical Exam "   Constitutional: She is oriented to person, place, and time. She appears well-developed and well-nourished. No distress.   HENT:   Head: Normocephalic and atraumatic.   Right Ear: Tympanic membrane and ear canal normal. Tympanic membrane is not erythematous and not retracted. No middle ear effusion.   Left Ear: Tympanic membrane and ear canal normal. Tympanic membrane is not erythematous and not retracted.  No middle ear effusion.   Nose: Rhinorrhea present. No mucosal edema. Right sinus exhibits no maxillary sinus tenderness and no frontal sinus tenderness. Left sinus exhibits no maxillary sinus tenderness and no frontal sinus tenderness.   Mouth/Throat: Oropharynx is clear and moist and mucous membranes are normal. Oral lesions (2-3 grouped vesicular lesions on upper lip, around the vermillion border, with unroofing and mild crusting of one lesion, no purulence) present. No posterior oropharyngeal edema or posterior oropharyngeal erythema.   Neck: Normal range of motion. No thyromegaly present.   Cardiovascular: Normal rate and regular rhythm.   No murmur heard.  Pulmonary/Chest: Effort normal and breath sounds normal. No tachypnea. No respiratory distress. She has no wheezes. She has no rhonchi. She has no rales. Chest wall is not dull to percussion.   Abdominal: Soft. Bowel sounds are normal. She exhibits no distension. There is no tenderness.   Musculoskeletal: Normal range of motion. She exhibits no edema.   Lymphadenopathy:     She has no cervical adenopathy.   Neurological: She is alert and oriented to person, place, and time. No cranial nerve deficit or sensory deficit. She exhibits normal muscle tone.   Skin: Skin is warm and dry. No rash noted.   Psychiatric: She has a normal mood and affect.   Vitals reviewed.      Assessment:       1. Acute URI    2. HSV-1 (herpes simplex virus 1) infection        Plan:     1. Acute URI  - history and exam findings reviewed, reassurance provided  - differential  reviewed, presentation is very consistent with acute URI, likely viral  - supportive care measures reviewed, have recommended increased oral fluids and judicious use of OTC cough remedies  - treatment options reviewed, have counseled that antibiotics are not indicated at this time, and the patient expressed understanding  - questions answered, warning signs reviewed, patient is comfortable with plan to monitor condition and was encouraged to call the office for any concerns or worsening of condition      2. HSV-1 (herpes simplex virus 1) infection  - exam findings reviewed, presentation is consistent with recurrent outbreak of orolabial HSV, although mild/focal Shingles would also be considered  - treatment options reviewed, will provide with course of acyclovir tablets, patient would also like Rx for topical treatment and advised this will likely be less effective than the tablets but she would like to see if it will help relieve her pain  - - acyclovir (ZOVIRAX) 400 MG tablet; Take 1 tablet (400 mg total) by mouth 3 (three) times daily. for 10 days  Dispense: 30 tablet; Refill: 2  - - acyclovir 5% (ZOVIRAX) 5 % Crea; Apply topically to affected area 5 (five) times daily.  Dispense: 5 g; Refill: 1   - additional supportive care measures reviewed    - Follow-up in the next week, as needed, for follow-up illness.     Rajan Cole MD  2/5/2019

## 2019-02-05 NOTE — LETTER
February 5, 2019      [facility]  [address] [address]  Phone: [phone]  Fax: [phone]       Patient: Dina Martínez   YOB: 1970  Date of Visit: 02/05/2019    To Whom It May Concern:    Sherri Martínez  was at [facility] on 02/05/2019. She may return to work/school on 02/08/2019 with no restrictions. If you have any questions or concerns, or if I can be of further assistance, please do not hesitate to contact me.    Sincerely,    Valerie Vásquez MA

## 2019-02-07 ENCOUNTER — LAB VISIT (OUTPATIENT)
Dept: LAB | Facility: HOSPITAL | Age: 49
End: 2019-02-07
Attending: FAMILY MEDICINE
Payer: COMMERCIAL

## 2019-02-07 DIAGNOSIS — I10 ESSENTIAL HYPERTENSION: ICD-10-CM

## 2019-02-07 DIAGNOSIS — R73.03 PREDIABETES: ICD-10-CM

## 2019-02-07 LAB
ALBUMIN SERPL BCP-MCNC: 3.8 G/DL
ALP SERPL-CCNC: 86 U/L
ALT SERPL W/O P-5'-P-CCNC: 17 U/L
ANION GAP SERPL CALC-SCNC: 8 MMOL/L
AST SERPL-CCNC: 17 U/L
BILIRUB SERPL-MCNC: 0.2 MG/DL
BUN SERPL-MCNC: 15 MG/DL
CALCIUM SERPL-MCNC: 10.1 MG/DL
CHLORIDE SERPL-SCNC: 104 MMOL/L
CO2 SERPL-SCNC: 27 MMOL/L
CREAT SERPL-MCNC: 0.8 MG/DL
EST. GFR  (AFRICAN AMERICAN): >60 ML/MIN/1.73 M^2
EST. GFR  (NON AFRICAN AMERICAN): >60 ML/MIN/1.73 M^2
ESTIMATED AVG GLUCOSE: 131 MG/DL
GLUCOSE SERPL-MCNC: 89 MG/DL
HBA1C MFR BLD HPLC: 6.2 %
POTASSIUM SERPL-SCNC: 4 MMOL/L
PROT SERPL-MCNC: 7.8 G/DL
SODIUM SERPL-SCNC: 139 MMOL/L

## 2019-02-07 PROCEDURE — 83036 HEMOGLOBIN GLYCOSYLATED A1C: CPT

## 2019-02-07 PROCEDURE — 36415 COLL VENOUS BLD VENIPUNCTURE: CPT

## 2019-02-07 PROCEDURE — 80053 COMPREHEN METABOLIC PANEL: CPT

## 2019-02-14 ENCOUNTER — OFFICE VISIT (OUTPATIENT)
Dept: INTERNAL MEDICINE | Facility: CLINIC | Age: 49
End: 2019-02-14
Payer: COMMERCIAL

## 2019-02-14 VITALS
HEART RATE: 85 BPM | BODY MASS INDEX: 43.15 KG/M2 | WEIGHT: 274.94 LBS | OXYGEN SATURATION: 98 % | HEIGHT: 67 IN | SYSTOLIC BLOOD PRESSURE: 122 MMHG | DIASTOLIC BLOOD PRESSURE: 67 MMHG

## 2019-02-14 DIAGNOSIS — Z13.6 ENCOUNTER FOR LIPID SCREENING FOR CARDIOVASCULAR DISEASE: ICD-10-CM

## 2019-02-14 DIAGNOSIS — B00.1 HERPES LABIALIS: ICD-10-CM

## 2019-02-14 DIAGNOSIS — G47.30 SLEEP APNEA, UNSPECIFIED TYPE: ICD-10-CM

## 2019-02-14 DIAGNOSIS — Z12.83 SKIN CANCER SCREENING: ICD-10-CM

## 2019-02-14 DIAGNOSIS — Z80.0 FAMILY HISTORY OF COLON CANCER: ICD-10-CM

## 2019-02-14 DIAGNOSIS — R73.03 PREDIABETES: ICD-10-CM

## 2019-02-14 DIAGNOSIS — Z00.00 ANNUAL PHYSICAL EXAM: Primary | ICD-10-CM

## 2019-02-14 DIAGNOSIS — E89.41 HOT FLASHES DUE TO SURGICAL MENOPAUSE: ICD-10-CM

## 2019-02-14 DIAGNOSIS — Z13.220 ENCOUNTER FOR LIPID SCREENING FOR CARDIOVASCULAR DISEASE: ICD-10-CM

## 2019-02-14 DIAGNOSIS — I10 ESSENTIAL HYPERTENSION: ICD-10-CM

## 2019-02-14 DIAGNOSIS — E55.9 VITAMIN D DEFICIENCY: ICD-10-CM

## 2019-02-14 DIAGNOSIS — K21.9 GASTROESOPHAGEAL REFLUX DISEASE WITHOUT ESOPHAGITIS: ICD-10-CM

## 2019-02-14 DIAGNOSIS — M72.2 PLANTAR FASCIITIS, LEFT: ICD-10-CM

## 2019-02-14 PROCEDURE — 99214 PR OFFICE/OUTPT VISIT, EST, LEVL IV, 30-39 MIN: ICD-10-PCS | Mod: S$GLB,,, | Performed by: FAMILY MEDICINE

## 2019-02-14 PROCEDURE — 3074F SYST BP LT 130 MM HG: CPT | Mod: CPTII,S$GLB,, | Performed by: FAMILY MEDICINE

## 2019-02-14 PROCEDURE — 3078F PR MOST RECENT DIASTOLIC BLOOD PRESSURE < 80 MM HG: ICD-10-PCS | Mod: CPTII,S$GLB,, | Performed by: FAMILY MEDICINE

## 2019-02-14 PROCEDURE — 3008F PR BODY MASS INDEX (BMI) DOCUMENTED: ICD-10-PCS | Mod: CPTII,S$GLB,, | Performed by: FAMILY MEDICINE

## 2019-02-14 PROCEDURE — 99999 PR PBB SHADOW E&M-EST. PATIENT-LVL III: ICD-10-PCS | Mod: PBBFAC,,, | Performed by: FAMILY MEDICINE

## 2019-02-14 PROCEDURE — 99999 PR PBB SHADOW E&M-EST. PATIENT-LVL III: CPT | Mod: PBBFAC,,, | Performed by: FAMILY MEDICINE

## 2019-02-14 PROCEDURE — 3074F PR MOST RECENT SYSTOLIC BLOOD PRESSURE < 130 MM HG: ICD-10-PCS | Mod: CPTII,S$GLB,, | Performed by: FAMILY MEDICINE

## 2019-02-14 PROCEDURE — 99214 OFFICE O/P EST MOD 30 MIN: CPT | Mod: S$GLB,,, | Performed by: FAMILY MEDICINE

## 2019-02-14 PROCEDURE — 3078F DIAST BP <80 MM HG: CPT | Mod: CPTII,S$GLB,, | Performed by: FAMILY MEDICINE

## 2019-02-14 PROCEDURE — 3008F BODY MASS INDEX DOCD: CPT | Mod: CPTII,S$GLB,, | Performed by: FAMILY MEDICINE

## 2019-02-14 RX ORDER — METFORMIN HYDROCHLORIDE 500 MG/1
500 TABLET ORAL 2 TIMES DAILY WITH MEALS
Qty: 180 TABLET | Refills: 3 | Status: SHIPPED | OUTPATIENT
Start: 2019-02-14 | End: 2019-08-12

## 2019-02-14 RX ORDER — VALACYCLOVIR HYDROCHLORIDE 1 G/1
TABLET, FILM COATED ORAL
Qty: 30 TABLET | Refills: 0 | Status: SHIPPED | OUTPATIENT
Start: 2019-02-14 | End: 2020-02-19 | Stop reason: SDUPTHER

## 2019-02-14 RX ORDER — TRIAMTERENE/HYDROCHLOROTHIAZID 37.5-25 MG
1 TABLET ORAL DAILY
Qty: 90 TABLET | Refills: 1 | Status: SHIPPED | OUTPATIENT
Start: 2019-02-14 | End: 2019-08-12 | Stop reason: SDUPTHER

## 2019-02-14 RX ORDER — DULOXETIN HYDROCHLORIDE 30 MG/1
30 CAPSULE, DELAYED RELEASE ORAL DAILY
Qty: 30 CAPSULE | Refills: 3 | Status: SHIPPED | OUTPATIENT
Start: 2019-02-14 | End: 2019-08-12

## 2019-02-14 NOTE — PROGRESS NOTES
"Subjective:       Patient ID: Dina Martínez is a 48 y.o. female.    Chief Complaint: Annual Exam    HPI  49 y/o HTN, BILL has home CPAP, L sided Plantar Fascitis,family hx of colon cancer, colon polyps due in 2019 is here for annual exam.   She is doing well, she is doing 30 min of walking 3 days a week, weight up 5 pounds, trying to eat healthy.  She is sleeping good. Mood good.  She gets occasional heartburn relived with Gaviscon, denies f/n/v/abdominal pain/d/constipation/cp/sob/urinary sx.  She has been having hot flashes since her hysterectomy, occurs a few times a week.  L sided Plantar Facisits: has been to podiatry in the past, has had injections and is using inserts without much relief, she takes Ibuprofen 400 mg 5 days a week which does help a little  Prediabetes: taking metformin 3 days a week, a1c 6.2 2/2019  HTN: controlled on triamterene/hctz  Eye exam due  Dental utd  Vaccines:utd  GYN: TONYA with BSO in 2015, pelvic due, mmg utd January  L buttock lesion: has been present over 10 years, not painful, not growing     Review of Systems   Constitutional: Negative for activity change, appetite change, fatigue and fever.   Respiratory: Negative for cough and shortness of breath.    Cardiovascular: Negative for chest pain, palpitations and leg swelling.   Gastrointestinal: Negative for constipation, diarrhea, nausea and vomiting.   Genitourinary: Negative for difficulty urinating and dysuria.   Musculoskeletal: Positive for arthralgias.   Skin: Negative for rash.   Neurological: Negative for dizziness and light-headedness.   Psychiatric/Behavioral: Negative for sleep disturbance.       Objective:      /67   Pulse 85   Ht 5' 7" (1.702 m)   Wt 124.7 kg (274 lb 14.6 oz)   LMP 10/25/2015   SpO2 98%   BMI 43.06 kg/m²   Physical Exam   Constitutional: She appears well-developed and well-nourished.   HENT:   Head: Normocephalic and atraumatic.   Mouth/Throat: No oropharyngeal exudate.   Neck: Normal " range of motion. Neck supple. No thyromegaly present.   Cardiovascular: Normal rate, regular rhythm and normal heart sounds.   Pulmonary/Chest: Effort normal and breath sounds normal. No respiratory distress.   Musculoskeletal: She exhibits no edema.   Lymphadenopathy:     She has no cervical adenopathy.   Neurological: She is alert.   Skin: Skin is warm and dry.   Psychiatric: She has a normal mood and affect.   Nursing note and vitals reviewed.      Assessment:       1. Annual physical exam    2. Herpes labialis    3. Prediabetes    4. Sleep apnea, unspecified type    5. Gastroesophageal reflux disease without esophagitis    6. Family history of colon cancer    7. Plantar fasciitis, left    8. Hot flashes due to surgical menopause    9. Essential hypertension    10. Encounter for lipid screening for cardiovascular disease    11. Vitamin D deficiency    12. Skin cancer screening        Plan:   Dina Robbins was seen today for annual exam.    Diagnoses and all orders for this visit:    Annual physical exam  -     CBC auto differential; Future  -     Comprehensive metabolic panel; Future  -     Hemoglobin A1c; Future  -     Lipid panel; Future  -     TSH; Future  -     Vitamin D; Future  -     Urinalysis; Future    Herpes labialis  -     valACYclovir (VALTREX) 1000 MG tablet; Take 2 tablets by mouth twice a day x 1 day for outbreak    Prediabetes  -     metFORMIN (GLUCOPHAGE) 500 MG tablet; Take 1 tablet (500 mg total) by mouth 2 (two) times daily with meals.  -     Hemoglobin A1c; Future    Sleep apnea, unspecified type    Gastroesophageal reflux disease without esophagitis    Family history of colon cancer    Plantar fasciitis, left  -     DULoxetine (CYMBALTA) 30 MG capsule; Take 1 capsule (30 mg total) by mouth once daily.    Hot flashes due to surgical menopause  -     DULoxetine (CYMBALTA) 30 MG capsule; Take 1 capsule (30 mg total) by mouth once daily.    Essential hypertension  -      triamterene-hydrochlorothiazide 37.5-25 mg (MAXZIDE-25) 37.5-25 mg per tablet; Take 1 tablet by mouth once daily.  -     CBC auto differential; Future  -     Comprehensive metabolic panel; Future    Encounter for lipid screening for cardiovascular disease  -     Lipid panel; Future    Vitamin D deficiency  -     Vitamin D; Future    Skin cancer screening  -     Ambulatory referral to Dermatology

## 2019-04-29 ENCOUNTER — TELEPHONE (OUTPATIENT)
Dept: INTERNAL MEDICINE | Facility: CLINIC | Age: 49
End: 2019-04-29

## 2019-07-10 ENCOUNTER — TELEPHONE (OUTPATIENT)
Dept: INTERNAL MEDICINE | Facility: CLINIC | Age: 49
End: 2019-07-10

## 2019-07-10 DIAGNOSIS — G47.33 OSA (OBSTRUCTIVE SLEEP APNEA): Primary | ICD-10-CM

## 2019-07-10 NOTE — TELEPHONE ENCOUNTER
----- Message from Sondra Willett sent at 7/10/2019 11:00 AM CDT -----  Contact: [facility] -[phone]  Type: Rx    Name of medication(s):  C-pap Supplies     Is this a refill? New rx? refll     Pharmacy Name, Phone, & Location:[facility] -[phone]  FAX# [phone]    Comments:please advise, thanks .

## 2019-07-17 ENCOUNTER — TELEPHONE (OUTPATIENT)
Dept: INTERNAL MEDICINE | Facility: CLINIC | Age: 49
End: 2019-07-17

## 2019-07-25 ENCOUNTER — OFFICE VISIT (OUTPATIENT)
Dept: ORTHOPEDICS | Facility: CLINIC | Age: 49
End: 2019-07-25
Payer: COMMERCIAL

## 2019-07-25 ENCOUNTER — HOSPITAL ENCOUNTER (OUTPATIENT)
Dept: RADIOLOGY | Facility: HOSPITAL | Age: 49
Discharge: HOME | End: 2019-07-25
Attending: PHYSICIAN ASSISTANT
Payer: COMMERCIAL

## 2019-07-25 VITALS — HEIGHT: 67 IN | WEIGHT: 274.94 LBS | BODY MASS INDEX: 43.15 KG/M2

## 2019-07-25 DIAGNOSIS — M25.571 RIGHT ANKLE PAIN, UNSPECIFIED CHRONICITY: ICD-10-CM

## 2019-07-25 DIAGNOSIS — M76.821 POSTERIOR TIBIAL TENDON DYSFUNCTION (PTTD) OF RIGHT LOWER EXTREMITY: Primary | ICD-10-CM

## 2019-07-25 DIAGNOSIS — M25.571 RIGHT ANKLE PAIN, UNSPECIFIED CHRONICITY: Primary | ICD-10-CM

## 2019-07-25 PROCEDURE — 99999 PR PBB SHADOW E&M-EST. PATIENT-LVL III: ICD-10-PCS | Mod: PBBFAC,,, | Performed by: PHYSICIAN ASSISTANT

## 2019-07-25 PROCEDURE — 73610 X-RAY EXAM OF ANKLE: CPT | Mod: TC,RT

## 2019-07-25 PROCEDURE — 99213 OFFICE O/P EST LOW 20 MIN: CPT | Mod: S$GLB,,, | Performed by: PHYSICIAN ASSISTANT

## 2019-07-25 PROCEDURE — 73610 X-RAY EXAM OF ANKLE: CPT | Mod: 26,RT,, | Performed by: RADIOLOGY

## 2019-07-25 PROCEDURE — 3008F BODY MASS INDEX DOCD: CPT | Mod: CPTII,S$GLB,, | Performed by: PHYSICIAN ASSISTANT

## 2019-07-25 PROCEDURE — 3008F PR BODY MASS INDEX (BMI) DOCUMENTED: ICD-10-PCS | Mod: CPTII,S$GLB,, | Performed by: PHYSICIAN ASSISTANT

## 2019-07-25 PROCEDURE — 99999 PR PBB SHADOW E&M-EST. PATIENT-LVL III: CPT | Mod: PBBFAC,,, | Performed by: PHYSICIAN ASSISTANT

## 2019-07-25 PROCEDURE — 73610 XR ANKLE COMPLETE 3 VIEW RIGHT: ICD-10-PCS | Mod: 26,RT,, | Performed by: RADIOLOGY

## 2019-07-25 PROCEDURE — 99213 PR OFFICE/OUTPT VISIT, EST, LEVL III, 20-29 MIN: ICD-10-PCS | Mod: S$GLB,,, | Performed by: PHYSICIAN ASSISTANT

## 2019-07-25 RX ORDER — MELOXICAM 15 MG/1
15 TABLET ORAL DAILY
Qty: 30 TABLET | Refills: 0 | Status: SHIPPED | OUTPATIENT
Start: 2019-07-25 | End: 2019-08-25

## 2019-07-25 NOTE — LETTER
July 26, 2019      [facility]  [address]  [facility] [address]  Phone: [phone]  Fax: [phone]       Patient: Dina Martínez   YOB: 1970  Date of Visit: 07/26/2019    To Whom It May Concern:    Sherri Martínez  was at [facility] on 07/25/2019. She may return to work on 08/23/2019 with no restrictions. If you have any questions or concerns, or if I can be of further assistance, please do not hesitate to contact me.    Sincerely,    Nazia Richards PA-C

## 2019-07-29 NOTE — PROGRESS NOTES
Subjective:      Patient ID: Dina Martínez is a 48 y.o. female.    Chief Complaint: Pain of the Right Ankle    HPI  48 year old female presents with chief complaint of right ankle pain x 3.5 weeks. She denies trauma. Pain is medial. It is worse with walking, palpation, and going down steps. Ibuprofen gives some relief. She reports mild swelling. She has tried Spenco and custom inserts in the past with no relief. She says she wears good supportive shoes. She works in security at [employer].   Review of Systems   Constitution: Negative for chills, fever and night sweats.   Cardiovascular: Negative for chest pain.   Respiratory: Negative for cough and shortness of breath.    Hematologic/Lymphatic: Does not bruise/bleed easily.   Skin: Negative for color change.   Gastrointestinal: Negative for heartburn.   Genitourinary: Negative for dysuria.   Neurological: Negative for numbness and paresthesias.   Psychiatric/Behavioral: Negative for altered mental status.   Allergic/Immunologic: Negative for persistent infections.         Objective:            General    Vitals reviewed.  Constitutional: She is oriented to person, place, and time. She appears well-developed and well-nourished.   Cardiovascular: Normal rate.    Neurological: She is alert and oriented to person, place, and time.         Right Ankle/Foot Exam     Inspection   Erythema: absent    Tenderness   The patient is tender to palpation of the medial malleolus and posterior tibial tendon.    Range of Motion   The patient has normal right ankle ROM.    Tests   Single Heel Rise: unable to perform    Other   Sensation: normal    Comments:  Pes planus. Pain with inversion.         Vascular Exam     Right Pulses  Dorsalis Pedis:      2+              X-ray: ordered and reviewed by myself. Visualized osseous structures demonstrate no evidence of recent or healing fracture, lytic destructive process, osteochondral defect, or other significant abnormality.  Tibiotalar  joint space is adequately maintained, without significant narrowing.  Pes planus is again seen and posterior and tiny plantar calcaneal spurs are incidentally observed.        Assessment:       Encounter Diagnosis   Name Primary?    Posterior tibial tendon dysfunction (PTTD) of right lower extremity Yes          Plan:       Discussed treatment options with patient. She was placed in a boot. She is WBAT. Mobic sent to pharmacy. RTC in 4 weeks for re-evaluation.

## 2019-07-31 ENCOUNTER — TELEPHONE (OUTPATIENT)
Dept: ORTHOPEDICS | Facility: CLINIC | Age: 49
End: 2019-07-31

## 2019-07-31 NOTE — TELEPHONE ENCOUNTER
Spoke to patient, explained that her paper work was to go to the disability office. I did fax it over for her. Also, gave her the number to the disability office for her to call. If she has any other questions, told her to give us a call.

## 2019-08-04 NOTE — PROGRESS NOTES
Dina Martínez 47 y.o. female is here today for Blood Pressure check.   History of HTN yes.      Patient verifies taking blood pressure medications. Last dose of blood pressure medication was taken at 8:30 Am. Patient took maxzide 37.5-25 mg.       Current Outpatient Prescriptions:     metFORMIN (GLUCOPHAGE) 500 MG tablet, Take 1 tablet (500 mg total) by mouth 2 (two) times daily with meals., Disp: 180 tablet, Rfl: 3    multivitamin capsule, Take 1 capsule by mouth once daily., Disp: , Rfl:     omeprazole (PRILOSEC) 20 MG capsule, Take 1 capsule (20 mg total) by mouth once daily., Disp: 30 capsule, Rfl: 0    sucralfate (CARAFATE) 1 gram tablet, Take 1 tablet (1 g total) by mouth 4 (four) times daily., Disp: 120 tablet, Rfl: 0    triamterene-hydrochlorothiazide 37.5-25 mg (MAXZIDE-25) 37.5-25 mg per tablet, Take 1 tablet by mouth once daily., Disp: 90 tablet, Rfl: 1    Does patient have record of home blood pressure readings yes. Readings have been averaging 120's/80's.     Patient is asymptomatic. Patient denies Headache, chest pain, blurred vision, left arm pain, right arm pain, leg pain, and/or swelling in the ankles or feet. Patient has no complaints today.     Blood pressure reading was 120/84, Pulse 78.     Pt's Home blood pressure machine read 114/83, Pulse 78.     Pt's Home blood pressure machine is accurate. Patient's blood pressure machine is off by 1 point on the top.     Review of patient's allergies indicates:  No Known Allergies  Creatinine   Date Value Ref Range Status   06/14/2018 0.8 0.5 - 1.4 mg/dL Final     Sodium   Date Value Ref Range Status   06/14/2018 142 136 - 145 mmol/L Final     Potassium   Date Value Ref Range Status   06/14/2018 4.2 3.5 - 5.1 mmol/L Final   ]    Dr. Hong notified.        Intact

## 2019-08-06 ENCOUNTER — TELEPHONE (OUTPATIENT)
Dept: INTERNAL MEDICINE | Facility: CLINIC | Age: 49
End: 2019-08-06

## 2019-08-06 NOTE — TELEPHONE ENCOUNTER
----- Message from Lilo Marion sent at 8/6/2019  1:02 PM CDT -----  Contact: STEPHEN HAYES [[id number]]  Name of Who is Calling: STEPHEN HAYES [[id number]]       What is the request in detail: Patient is requesting a call from staff in regards to getting orders for blood work for her annual .....Please contact to further discuss and advise.     Can the clinic reply by [facility]: yes     What Number to [facility] Back [facility]: [phone]

## 2019-08-07 ENCOUNTER — LAB VISIT (OUTPATIENT)
Dept: LAB | Facility: HOSPITAL | Age: 49
End: 2019-08-07
Attending: FAMILY MEDICINE
Payer: COMMERCIAL

## 2019-08-07 DIAGNOSIS — R73.03 PREDIABETES: ICD-10-CM

## 2019-08-07 DIAGNOSIS — Z00.00 ANNUAL PHYSICAL EXAM: ICD-10-CM

## 2019-08-07 DIAGNOSIS — Z13.6 ENCOUNTER FOR LIPID SCREENING FOR CARDIOVASCULAR DISEASE: ICD-10-CM

## 2019-08-07 DIAGNOSIS — E55.9 VITAMIN D DEFICIENCY: ICD-10-CM

## 2019-08-07 DIAGNOSIS — Z13.220 ENCOUNTER FOR LIPID SCREENING FOR CARDIOVASCULAR DISEASE: ICD-10-CM

## 2019-08-07 DIAGNOSIS — I10 ESSENTIAL HYPERTENSION: ICD-10-CM

## 2019-08-07 LAB
25(OH)D3+25(OH)D2 SERPL-MCNC: 24 NG/ML (ref 30–96)
ALBUMIN SERPL BCP-MCNC: 3.7 G/DL (ref 3.5–5.2)
ALP SERPL-CCNC: 94 U/L (ref 55–135)
ALT SERPL W/O P-5'-P-CCNC: 13 U/L (ref 10–44)
ANION GAP SERPL CALC-SCNC: 9 MMOL/L (ref 8–16)
AST SERPL-CCNC: 13 U/L (ref 10–40)
BASOPHILS # BLD AUTO: 0.02 K/UL (ref 0–0.2)
BASOPHILS NFR BLD: 0.3 % (ref 0–1.9)
BILIRUB SERPL-MCNC: 0.3 MG/DL (ref 0.1–1)
BUN SERPL-MCNC: 14 MG/DL (ref 6–20)
CALCIUM SERPL-MCNC: 9.8 MG/DL (ref 8.7–10.5)
CHLORIDE SERPL-SCNC: 105 MMOL/L (ref 95–110)
CHOLEST SERPL-MCNC: 202 MG/DL (ref 120–199)
CHOLEST/HDLC SERPL: 3.5 {RATIO} (ref 2–5)
CO2 SERPL-SCNC: 24 MMOL/L (ref 23–29)
CREAT SERPL-MCNC: 0.8 MG/DL (ref 0.5–1.4)
DIFFERENTIAL METHOD: ABNORMAL
EOSINOPHIL # BLD AUTO: 0.1 K/UL (ref 0–0.5)
EOSINOPHIL NFR BLD: 2.4 % (ref 0–8)
ERYTHROCYTE [DISTWIDTH] IN BLOOD BY AUTOMATED COUNT: 14.7 % (ref 11.5–14.5)
EST. GFR  (AFRICAN AMERICAN): >60 ML/MIN/1.73 M^2
EST. GFR  (NON AFRICAN AMERICAN): >60 ML/MIN/1.73 M^2
ESTIMATED AVG GLUCOSE: 131 MG/DL (ref 68–131)
GLUCOSE SERPL-MCNC: 115 MG/DL (ref 70–110)
HBA1C MFR BLD HPLC: 6.2 % (ref 4–5.6)
HCT VFR BLD AUTO: 41.4 % (ref 37–48.5)
HDLC SERPL-MCNC: 58 MG/DL (ref 40–75)
HDLC SERPL: 28.7 % (ref 20–50)
HGB BLD-MCNC: 13.4 G/DL (ref 12–16)
LDLC SERPL CALC-MCNC: 123.2 MG/DL (ref 63–159)
LYMPHOCYTES # BLD AUTO: 2.2 K/UL (ref 1–4.8)
LYMPHOCYTES NFR BLD: 38.3 % (ref 18–48)
MCH RBC QN AUTO: 26.4 PG (ref 27–31)
MCHC RBC AUTO-ENTMCNC: 32.4 G/DL (ref 32–36)
MCV RBC AUTO: 82 FL (ref 82–98)
MONOCYTES # BLD AUTO: 0.3 K/UL (ref 0.3–1)
MONOCYTES NFR BLD: 5.4 % (ref 4–15)
NEUTROPHILS # BLD AUTO: 3.1 K/UL (ref 1.8–7.7)
NEUTROPHILS NFR BLD: 53.6 % (ref 38–73)
NONHDLC SERPL-MCNC: 144 MG/DL
PLATELET # BLD AUTO: 263 K/UL (ref 150–350)
PMV BLD AUTO: 10.5 FL (ref 9.2–12.9)
POTASSIUM SERPL-SCNC: 4 MMOL/L (ref 3.5–5.1)
PROT SERPL-MCNC: 7.9 G/DL (ref 6–8.4)
RBC # BLD AUTO: 5.07 M/UL (ref 4–5.4)
SODIUM SERPL-SCNC: 138 MMOL/L (ref 136–145)
T4 FREE SERPL-MCNC: 0.93 NG/DL (ref 0.71–1.51)
TRIGL SERPL-MCNC: 104 MG/DL (ref 30–150)
TSH SERPL DL<=0.005 MIU/L-ACNC: 4.17 UIU/ML (ref 0.4–4)
WBC # BLD AUTO: 5.72 K/UL (ref 3.9–12.7)

## 2019-08-07 PROCEDURE — 84443 ASSAY THYROID STIM HORMONE: CPT

## 2019-08-07 PROCEDURE — 82306 VITAMIN D 25 HYDROXY: CPT

## 2019-08-07 PROCEDURE — 83036 HEMOGLOBIN GLYCOSYLATED A1C: CPT

## 2019-08-07 PROCEDURE — 36415 COLL VENOUS BLD VENIPUNCTURE: CPT

## 2019-08-07 PROCEDURE — 85025 COMPLETE CBC W/AUTO DIFF WBC: CPT

## 2019-08-07 PROCEDURE — 80061 LIPID PANEL: CPT

## 2019-08-07 PROCEDURE — 84439 ASSAY OF FREE THYROXINE: CPT

## 2019-08-07 PROCEDURE — 80053 COMPREHEN METABOLIC PANEL: CPT

## 2019-08-12 ENCOUNTER — OFFICE VISIT (OUTPATIENT)
Dept: INTERNAL MEDICINE | Facility: CLINIC | Age: 49
End: 2019-08-12
Payer: COMMERCIAL

## 2019-08-12 VITALS
SYSTOLIC BLOOD PRESSURE: 128 MMHG | WEIGHT: 284.81 LBS | DIASTOLIC BLOOD PRESSURE: 88 MMHG | OXYGEN SATURATION: 98 % | HEART RATE: 81 BPM | HEIGHT: 67 IN | BODY MASS INDEX: 44.7 KG/M2

## 2019-08-12 DIAGNOSIS — J30.9 ALLERGIC RHINITIS, UNSPECIFIED SEASONALITY, UNSPECIFIED TRIGGER: ICD-10-CM

## 2019-08-12 DIAGNOSIS — R73.03 PREDIABETES: ICD-10-CM

## 2019-08-12 DIAGNOSIS — R11.0 NAUSEA: ICD-10-CM

## 2019-08-12 DIAGNOSIS — I10 ESSENTIAL HYPERTENSION: Primary | ICD-10-CM

## 2019-08-12 PROCEDURE — 99999 PR PBB SHADOW E&M-EST. PATIENT-LVL III: CPT | Mod: PBBFAC,,, | Performed by: FAMILY MEDICINE

## 2019-08-12 PROCEDURE — 3074F SYST BP LT 130 MM HG: CPT | Mod: CPTII,S$GLB,, | Performed by: FAMILY MEDICINE

## 2019-08-12 PROCEDURE — 3079F PR MOST RECENT DIASTOLIC BLOOD PRESSURE 80-89 MM HG: ICD-10-PCS | Mod: CPTII,S$GLB,, | Performed by: FAMILY MEDICINE

## 2019-08-12 PROCEDURE — 99214 OFFICE O/P EST MOD 30 MIN: CPT | Mod: S$GLB,,, | Performed by: FAMILY MEDICINE

## 2019-08-12 PROCEDURE — 3079F DIAST BP 80-89 MM HG: CPT | Mod: CPTII,S$GLB,, | Performed by: FAMILY MEDICINE

## 2019-08-12 PROCEDURE — 3008F PR BODY MASS INDEX (BMI) DOCUMENTED: ICD-10-PCS | Mod: CPTII,S$GLB,, | Performed by: FAMILY MEDICINE

## 2019-08-12 PROCEDURE — 99214 PR OFFICE/OUTPT VISIT, EST, LEVL IV, 30-39 MIN: ICD-10-PCS | Mod: S$GLB,,, | Performed by: FAMILY MEDICINE

## 2019-08-12 PROCEDURE — 3008F BODY MASS INDEX DOCD: CPT | Mod: CPTII,S$GLB,, | Performed by: FAMILY MEDICINE

## 2019-08-12 PROCEDURE — 3074F PR MOST RECENT SYSTOLIC BLOOD PRESSURE < 130 MM HG: ICD-10-PCS | Mod: CPTII,S$GLB,, | Performed by: FAMILY MEDICINE

## 2019-08-12 PROCEDURE — 99999 PR PBB SHADOW E&M-EST. PATIENT-LVL III: ICD-10-PCS | Mod: PBBFAC,,, | Performed by: FAMILY MEDICINE

## 2019-08-12 RX ORDER — METFORMIN HYDROCHLORIDE 500 MG/1
1000 TABLET, EXTENDED RELEASE ORAL
Qty: 180 TABLET | Refills: 3 | Status: SHIPPED | OUTPATIENT
Start: 2019-08-12 | End: 2020-02-19 | Stop reason: SDUPTHER

## 2019-08-12 RX ORDER — FLUTICASONE PROPIONATE 50 MCG
1 SPRAY, SUSPENSION (ML) NASAL DAILY
Qty: 16 G | Refills: 6 | Status: SHIPPED | OUTPATIENT
Start: 2019-08-12 | End: 2020-02-19

## 2019-08-12 RX ORDER — ONDANSETRON 4 MG/1
4 TABLET, ORALLY DISINTEGRATING ORAL EVERY 8 HOURS PRN
Qty: 16 TABLET | Refills: 0 | Status: SHIPPED | OUTPATIENT
Start: 2019-08-12 | End: 2021-04-15 | Stop reason: SDUPTHER

## 2019-08-12 RX ORDER — TRIAMTERENE/HYDROCHLOROTHIAZID 37.5-25 MG
1 TABLET ORAL DAILY
Qty: 90 TABLET | Refills: 1 | Status: SHIPPED | OUTPATIENT
Start: 2019-08-12 | End: 2020-02-19 | Stop reason: SDUPTHER

## 2019-08-12 NOTE — PROGRESS NOTES
Subjective:       Patient ID: Dina Martínez is a 48 y.o. female.    Chief Complaint: Follow-up and Hypertension    HPI  49 y/o HTN, BILL has home CPAP (4 hours a night), L sided Plantar Fascitis, family hx of colon cancer, colon polyps, is here for follow up.    She has had nasal congestion on and off for the past few weeks, she denies pnd/sinus pressure/f, she has some nausea, taking Zofran a few times a month, she denies v/d/constipation/cp/sob/urinary sx.  She is using Mucinex sinus max without relief. Her weight is up 10 pounds since last visit but she was weighed with her boot on. She has not been very active, she is in a boot for the next few weeks secondary to L posterior tendon pain, her pain is controlled if she is wearing her boot, the mobic does not really help.    L sided Plantar Facisits: has been to podiatry in the past, has had injections and is using inserts without much relief, cymbalta caused fatigue  Prediabetes: taking metformin 3 days a week, a1c 6.2 2/2019  HTN: Maxzide 37.5-25 mg daily  Colonoscopy utd  Eye exam due  Dental utd  Vaccines:utd  GYN: TONYA with BSO in 2015, pelvic due, mmg utd January  L buttock lesion: has been present over 10 years, not painful, not growing    Review of Systems   Constitutional: Negative for activity change, appetite change, fatigue and fever.   HENT: Positive for congestion. Negative for postnasal drip, rhinorrhea and sinus pressure.    Respiratory: Negative for cough and shortness of breath.    Cardiovascular: Negative for chest pain, palpitations and leg swelling.   Gastrointestinal: Negative for constipation, diarrhea, nausea and vomiting.   Genitourinary: Negative for difficulty urinating and dysuria.   Skin: Negative for rash.   Neurological: Negative for dizziness and light-headedness.   Psychiatric/Behavioral: Negative for sleep disturbance.       Objective:      /88 (BP Location: Left arm, Patient Position: Sitting, BP Method: Large (Manual))   " Pulse 81   Ht 5' 7" (1.702 m)   Wt 129.2 kg (284 lb 13.4 oz)   LMP 10/25/2015   SpO2 98%   BMI 44.61 kg/m²   Physical Exam   Constitutional: She appears well-developed and well-nourished.   HENT:   Head: Normocephalic and atraumatic.   Mouth/Throat: No oropharyngeal exudate.   Neck: Normal range of motion. Neck supple. No thyromegaly present.   Cardiovascular: Normal rate, regular rhythm and normal heart sounds.   Pulmonary/Chest: Effort normal and breath sounds normal. No respiratory distress.   Musculoskeletal: She exhibits no edema.   Lymphadenopathy:     She has no cervical adenopathy.   Neurological: She is alert.   Skin: Skin is warm and dry.   Psychiatric: She has a normal mood and affect.   Nursing note and vitals reviewed.      Assessment:       1. Essential hypertension    2. Prediabetes    3. Nausea    4. Allergic rhinitis, unspecified seasonality, unspecified trigger        Plan:   Dina Robbins was seen today for follow-up and hypertension.    Diagnoses and all orders for this visit:    Essential hypertension  -     triamterene-hydrochlorothiazide 37.5-25 mg (MAXZIDE-25) 37.5-25 mg per tablet; Take 1 tablet by mouth once daily.  -     Comprehensive metabolic panel; Future  -     TSH; Future    Prediabetes  -     metFORMIN (GLUCOPHAGE-XR) 500 MG 24 hr tablet; Take 2 tablets (1,000 mg total) by mouth daily with breakfast.  -     Vitamin B12; Future    Nausea  -     ondansetron (ZOFRAN-ODT) 4 MG TbDL; Dissolve 1 tablet (4 mg total) by mouth every 8 (eight) hours as needed.    Allergic rhinitis, unspecified seasonality, unspecified trigger  -     fluticasone propionate (FLONASE) 50 mcg/actuation nasal spray; 1 spray (50 mcg total) by Each Nostril route once daily.      "

## 2019-08-22 ENCOUNTER — OFFICE VISIT (OUTPATIENT)
Dept: ORTHOPEDICS | Facility: CLINIC | Age: 49
End: 2019-08-22
Payer: COMMERCIAL

## 2019-08-22 VITALS — BODY MASS INDEX: 44.24 KG/M2 | HEIGHT: 67 IN | WEIGHT: 281.88 LBS

## 2019-08-22 DIAGNOSIS — M76.821 POSTERIOR TIBIAL TENDON DYSFUNCTION (PTTD) OF RIGHT LOWER EXTREMITY: Primary | ICD-10-CM

## 2019-08-22 PROCEDURE — 3008F BODY MASS INDEX DOCD: CPT | Mod: CPTII,S$GLB,, | Performed by: PHYSICIAN ASSISTANT

## 2019-08-22 PROCEDURE — 99213 PR OFFICE/OUTPT VISIT, EST, LEVL III, 20-29 MIN: ICD-10-PCS | Mod: S$GLB,,, | Performed by: PHYSICIAN ASSISTANT

## 2019-08-22 PROCEDURE — 99999 PR PBB SHADOW E&M-EST. PATIENT-LVL III: CPT | Mod: PBBFAC,,, | Performed by: PHYSICIAN ASSISTANT

## 2019-08-22 PROCEDURE — 99213 OFFICE O/P EST LOW 20 MIN: CPT | Mod: S$GLB,,, | Performed by: PHYSICIAN ASSISTANT

## 2019-08-22 PROCEDURE — 99999 PR PBB SHADOW E&M-EST. PATIENT-LVL III: ICD-10-PCS | Mod: PBBFAC,,, | Performed by: PHYSICIAN ASSISTANT

## 2019-08-22 PROCEDURE — 3008F PR BODY MASS INDEX (BMI) DOCUMENTED: ICD-10-PCS | Mod: CPTII,S$GLB,, | Performed by: PHYSICIAN ASSISTANT

## 2019-08-22 NOTE — LETTER
August 22, 2019    Dina Martínez  [address]  [facility] [address]             [facility]  [address]  [facility] [address]  Phone: [phone]  Fax: [phone] To whom it may concern:    Ms. Martínez may return to work on 8-24-19 with no restrictions.     If you have any questions or concerns, please don't hesitate to call.    Sincerely,        Nazia Richards PA-C

## 2019-08-23 NOTE — PROGRESS NOTES
Subjective:      Patient ID: Dina Martínez is a 48 y.o. female.    Chief Complaint: Pain of the Right Ankle    HPI  Patient returns for f/u for her right ankle. She wore the boot for almost 4 weeks. She reports mild soreness. She took the mobic which helped. She is wearing Spenco's. She says her foot has gotten better since last visit. She says she is 95% better.   Review of Systems   Constitution: Negative for chills, fever and night sweats.   Cardiovascular: Negative for chest pain.   Respiratory: Negative for cough and shortness of breath.    Hematologic/Lymphatic: Does not bruise/bleed easily.   Skin: Negative for color change.   Gastrointestinal: Negative for heartburn.   Genitourinary: Negative for dysuria.   Neurological: Negative for numbness and paresthesias.   Psychiatric/Behavioral: Negative for altered mental status.   Allergic/Immunologic: Negative for persistent infections.         Objective:            General    Vitals reviewed.  Constitutional: She is oriented to person, place, and time. She appears well-developed and well-nourished.   Cardiovascular: Normal rate.    Neurological: She is alert and oriented to person, place, and time.         Right Ankle/Foot Exam     Inspection   Erythema: absent    Range of Motion   The patient has normal right ankle ROM.    Tests   Single Heel Rise: able to perform    Other   Sensation: normal    Comments:  No TTP. Pain with single heel rise.         Vascular Exam     Right Pulses  Dorsalis Pedis:      2+                      Assessment:       Encounter Diagnosis   Name Primary?    Posterior tibial tendon dysfunction (PTTD) of right lower extremity Yes          Plan:       Patient will continue good supportive shoes with inserts. Order placed for PT. She will return to work on 8-24 with no restrictions. RTC in 6 weeks for re-evaluation.

## 2019-09-10 ENCOUNTER — PATIENT MESSAGE (OUTPATIENT)
Dept: INTERNAL MEDICINE | Facility: CLINIC | Age: 49
End: 2019-09-10

## 2019-09-11 NOTE — TELEPHONE ENCOUNTER
Informed pt of Dr. Hong advice. Pt verbalized understanding. schedule pt for 9/17/19 to see Dr. Hong. Pt verbalized understanding.

## 2019-09-20 ENCOUNTER — OFFICE VISIT (OUTPATIENT)
Dept: DERMATOLOGY | Facility: CLINIC | Age: 49
End: 2019-09-20
Payer: COMMERCIAL

## 2019-09-20 DIAGNOSIS — D48.5 NEOPLASM OF UNCERTAIN BEHAVIOR OF SKIN: Primary | ICD-10-CM

## 2019-09-20 DIAGNOSIS — L72.0 EIC (EPIDERMAL INCLUSION CYST): ICD-10-CM

## 2019-09-20 PROCEDURE — 99201 PR OFFICE/OUTPT VISIT,NEW,LEVL I: ICD-10-PCS | Mod: 25,S$GLB,, | Performed by: DERMATOLOGY

## 2019-09-20 PROCEDURE — 99999 PR PBB SHADOW E&M-EST. PATIENT-LVL III: ICD-10-PCS | Mod: PBBFAC,,, | Performed by: DERMATOLOGY

## 2019-09-20 PROCEDURE — 88304 TISSUE EXAM BY PATHOLOGIST: CPT | Mod: 26,,, | Performed by: PATHOLOGY

## 2019-09-20 PROCEDURE — 88304 TISSUE EXAM BY PATHOLOGIST: CPT | Performed by: PATHOLOGY

## 2019-09-20 PROCEDURE — 99201 PR OFFICE/OUTPT VISIT,NEW,LEVL I: CPT | Mod: 25,S$GLB,, | Performed by: DERMATOLOGY

## 2019-09-20 PROCEDURE — 11102 PR TANGENTIAL BIOPSY, SKIN, SINGLE LESION: ICD-10-PCS | Mod: S$GLB,,, | Performed by: DERMATOLOGY

## 2019-09-20 PROCEDURE — 99999 PR PBB SHADOW E&M-EST. PATIENT-LVL III: CPT | Mod: PBBFAC,,, | Performed by: DERMATOLOGY

## 2019-09-20 PROCEDURE — 11102 TANGNTL BX SKIN SINGLE LES: CPT | Mod: S$GLB,,, | Performed by: DERMATOLOGY

## 2019-09-20 PROCEDURE — 88304 TISSUE SPECIMEN TO PATHOLOGY, DERMATOLOGY: ICD-10-PCS | Mod: 26,,, | Performed by: PATHOLOGY

## 2019-09-20 NOTE — PROGRESS NOTES
Subjective:       Patient ID:  Dina Martínez is a 48 y.o. female who presents for   Chief Complaint   Patient presents with    Mole     abdomen    Cyst     buttocks     Patient complains of lesion(s)  Location: abdomen  Duration: whole life  Symptoms: growing  Relieving factors/Previous treatments: none    Patient complains of lesion(s)  Location: cyst on left buttock  Duration: years  Symptoms: none  Relieving factors/Previous treatments: none    No h/o nmsc or mm          Review of Systems   Skin: Negative for itching and rash.   Hematologic/Lymphatic: Does not bruise/bleed easily.        Objective:    Physical Exam   Constitutional: She appears well-developed and well-nourished. She is obese.  No distress.   Neurological: She is alert and oriented to person, place, and time. She is not disoriented.   Psychiatric: She has a normal mood and affect.   Skin:   Areas Examined (abnormalities noted in diagram):   Abdomen Inspection Performed  Genitals / Buttocks / Groin Inspection Performed              Diagram Legend     Erythematous scaling macule/papule c/w actinic keratosis       Vascular papule c/w angioma      Pigmented verrucoid papule/plaque c/w seborrheic keratosis      Yellow umbilicated papule c/w sebaceous hyperplasia      Irregularly shaped tan macule c/w lentigo     1-2 mm smooth white papules consistent with Milia      Movable subcutaneous cyst with punctum c/w epidermal inclusion cyst      Subcutaneous movable cyst c/w pilar cyst      Firm pink to brown papule c/w dermatofibroma      Pedunculated fleshy papule(s) c/w skin tag(s)      Evenly pigmented macule c/w junctional nevus     Mildly variegated pigmented, slightly irregular-bordered macule c/w mildly atypical nevus      Flesh colored to evenly pigmented papule c/w intradermal nevus       Pink pearly papule/plaque c/w basal cell carcinoma      Erythematous hyperkeratotic cursted plaque c/w SCC      Surgical scar with no sign of skin cancer  recurrence      Open and closed comedones      Inflammatory papules and pustules      Verrucoid papule consistent consistent with wart     Erythematous eczematous patches and plaques     Dystrophic onycholytic nail with subungual debris c/w onychomycosis     Umbilicated papule    Erythematous-base heme-crusted tan verrucoid plaque consistent with inflamed seborrheic keratosis     Erythematous Silvery Scaling Plaque c/w Psoriasis     See annotation              Assessment / Plan:      Pathology Orders:     Normal Orders This Visit    Tissue Specimen To Pathology, Dermatology     Questions:    Directional Terms:  Other(comment)    Clinical Information:  r/o papilloma    Specific Site:  left upper abdomen        Neoplasm of uncertain behavior of skin  -     Tissue Specimen To Pathology, Dermatology    Shave biopsy procedure note:    Shave biopsy performed after verbal consent including risk of infection, scar, recurrence, need for additional treatment of site. Area prepped with alcohol, anesthetized with approximately 1.0cc of 1% lidocaine with epinephrine. Lesional tissue shaved with razor blade. Hemostasis achieved with application of aluminum chloride followed by hyfrecation. No complications. Dressing applied. Wound care explained.        EIC (epidermal inclusion cyst) - left buttock  Reassurance. No treatment needed.  Do not manipulate             Follow up if symptoms worsen or fail to improve.

## 2019-09-20 NOTE — PATIENT INSTRUCTIONS

## 2019-10-01 ENCOUNTER — PATIENT OUTREACH (OUTPATIENT)
Dept: ADMINISTRATIVE | Facility: OTHER | Age: 49
End: 2019-10-01

## 2019-10-28 ENCOUNTER — TELEPHONE (OUTPATIENT)
Dept: PHARMACY | Facility: CLINIC | Age: 49
End: 2019-10-28

## 2019-10-28 NOTE — TELEPHONE ENCOUNTER
See below message    Good Afternoon,       The prior authorization for Dina Martínez's Acyclovir 5% cream prescription has been DENIED for the following reason(s): it is a plan exclusion and it not covered on her plans benefits. Please send a new prescription for a preferred medication ( acyclovir tablets are preferred) or recommend a OTC medication that the patient can try.     We were unable to reach patient on 10/28/2019.  A voicemail was left for the patient of the prior authorization status along with an appropriate pharmacy phone number should she have questions.     If there are any additional questions or concerns, please contact me.     Thanks,   Marquita Castrejon   Pharmacy Technician   [facility]   Phone: [phone]   Fax: [phone]

## 2019-10-29 NOTE — TELEPHONE ENCOUNTER
Please find out if she needs a prescription for oral acyclovir or if she wants to try over-the-counter Abreva

## 2019-10-31 ENCOUNTER — PATIENT MESSAGE (OUTPATIENT)
Dept: INTERNAL MEDICINE | Facility: CLINIC | Age: 49
End: 2019-10-31

## 2019-11-11 ENCOUNTER — PATIENT MESSAGE (OUTPATIENT)
Dept: INTERNAL MEDICINE | Facility: CLINIC | Age: 49
End: 2019-11-11

## 2019-12-23 ENCOUNTER — PATIENT OUTREACH (OUTPATIENT)
Dept: ADMINISTRATIVE | Facility: OTHER | Age: 49
End: 2019-12-23

## 2019-12-26 ENCOUNTER — OFFICE VISIT (OUTPATIENT)
Dept: OPTOMETRY | Facility: CLINIC | Age: 49
End: 2019-12-26
Payer: COMMERCIAL

## 2019-12-26 DIAGNOSIS — R73.03 PREDIABETES: Primary | ICD-10-CM

## 2019-12-26 DIAGNOSIS — H52.4 PRESBYOPIA: ICD-10-CM

## 2019-12-26 PROCEDURE — 99999 PR PBB SHADOW E&M-EST. PATIENT-LVL II: CPT | Mod: PBBFAC,,, | Performed by: OPTOMETRIST

## 2019-12-26 PROCEDURE — 92014 PR EYE EXAM, EST PATIENT,COMPREHESV: ICD-10-PCS | Mod: S$GLB,,, | Performed by: OPTOMETRIST

## 2019-12-26 PROCEDURE — 92014 COMPRE OPH EXAM EST PT 1/>: CPT | Mod: S$GLB,,, | Performed by: OPTOMETRIST

## 2019-12-26 PROCEDURE — 99999 PR PBB SHADOW E&M-EST. PATIENT-LVL II: ICD-10-PCS | Mod: PBBFAC,,, | Performed by: OPTOMETRIST

## 2019-12-26 NOTE — PROGRESS NOTES
HPI     Last eye exam was 6/22/17 with   Pt here for annual eye exam.  Pt states she is doing well. Pt does not wear glasses, but does wear   reading glasses +1.50.  Patient denies diplopia, headaches, flashes/floaters, and pain.  No eye drops, and no eye sx.    Hemoglobin A1C       Date                     Value               Ref Range             Status                08/07/2019               6.2 (H)             4.0 - 5.6 %           Final                   Last edited by Erin Salguero on 12/26/2019  2:30 PM. (History)            Assessment /Plan     For exam results, see Encounter Report.    Prediabetes    Presbyopia            1.  No retinopathy--monitor yearly.  BS control.  Eye health normal OU.  2.  Continue with OTC readers.

## 2020-01-24 ENCOUNTER — TELEPHONE (OUTPATIENT)
Dept: RADIOLOGY | Facility: HOSPITAL | Age: 50
End: 2020-01-24

## 2020-01-27 ENCOUNTER — TELEPHONE (OUTPATIENT)
Dept: INTERNAL MEDICINE | Facility: CLINIC | Age: 50
End: 2020-01-27

## 2020-01-27 ENCOUNTER — HOSPITAL ENCOUNTER (OUTPATIENT)
Dept: RADIOLOGY | Facility: HOSPITAL | Age: 50
Discharge: HOME | End: 2020-01-27
Attending: FAMILY MEDICINE
Payer: COMMERCIAL

## 2020-01-27 DIAGNOSIS — Z12.39 SCREENING FOR BREAST CANCER: ICD-10-CM

## 2020-01-27 DIAGNOSIS — E11.9 NEW ONSET TYPE 2 DIABETES MELLITUS: Primary | ICD-10-CM

## 2020-01-27 PROCEDURE — 77067 SCR MAMMO BI INCL CAD: CPT | Mod: 26,,, | Performed by: RADIOLOGY

## 2020-01-27 PROCEDURE — 77067 SCR MAMMO BI INCL CAD: CPT | Mod: TC

## 2020-01-27 PROCEDURE — 77063 BREAST TOMOSYNTHESIS BI: CPT | Mod: 26,,, | Performed by: RADIOLOGY

## 2020-01-27 PROCEDURE — 77067 MAMMO DIGITAL SCREENING BILAT WITH TOMOSYNTHESIS_CAD: ICD-10-PCS | Mod: 26,,, | Performed by: RADIOLOGY

## 2020-01-27 PROCEDURE — 77063 MAMMO DIGITAL SCREENING BILAT WITH TOMOSYNTHESIS_CAD: ICD-10-PCS | Mod: 26,,, | Performed by: RADIOLOGY

## 2020-01-28 NOTE — TELEPHONE ENCOUNTER
Your blood sugar is now in the diabetic range.  I recommend we set up an appointment in diabetes education to go over glucometer use and diet.  We can also add a medication like Trulicity to your regimen or increase your metformin.  What dose and frequency of metformin are you currently taking?     Spoke to patient, she is currently taking metformin 1000 mg daily with breakfast. She states that she has not been taking it regularly. She did start 3 days ago taking it regularly. She would like to hold off on the Trulicity for now and repeat her blood work in a few months to see if her numbers have come down. Declined Diabetes Ed appointment at this time. She will call back once she gets her new schedule.

## 2020-02-05 ENCOUNTER — PATIENT OUTREACH (OUTPATIENT)
Dept: ADMINISTRATIVE | Facility: HOSPITAL | Age: 50
End: 2020-02-05

## 2020-02-19 ENCOUNTER — OFFICE VISIT (OUTPATIENT)
Dept: INTERNAL MEDICINE | Facility: CLINIC | Age: 50
End: 2020-02-19
Payer: COMMERCIAL

## 2020-02-19 VITALS
DIASTOLIC BLOOD PRESSURE: 76 MMHG | WEIGHT: 276.81 LBS | HEART RATE: 85 BPM | BODY MASS INDEX: 44.49 KG/M2 | TEMPERATURE: 98 F | OXYGEN SATURATION: 97 % | HEIGHT: 66 IN | SYSTOLIC BLOOD PRESSURE: 116 MMHG

## 2020-02-19 DIAGNOSIS — B00.1 HERPES LABIALIS: ICD-10-CM

## 2020-02-19 DIAGNOSIS — E11.9 NEW ONSET TYPE 2 DIABETES MELLITUS: ICD-10-CM

## 2020-02-19 DIAGNOSIS — M72.2 PLANTAR FASCIITIS: ICD-10-CM

## 2020-02-19 DIAGNOSIS — I10 ESSENTIAL HYPERTENSION: ICD-10-CM

## 2020-02-19 DIAGNOSIS — E07.89 THYROID FULLNESS: ICD-10-CM

## 2020-02-19 DIAGNOSIS — Z00.00 ANNUAL PHYSICAL EXAM: Primary | ICD-10-CM

## 2020-02-19 PROCEDURE — 99999 PR PBB SHADOW E&M-EST. PATIENT-LVL IV: CPT | Mod: PBBFAC,,, | Performed by: FAMILY MEDICINE

## 2020-02-19 PROCEDURE — 3078F PR MOST RECENT DIASTOLIC BLOOD PRESSURE < 80 MM HG: ICD-10-PCS | Mod: CPTII,S$GLB,, | Performed by: FAMILY MEDICINE

## 2020-02-19 PROCEDURE — 3074F PR MOST RECENT SYSTOLIC BLOOD PRESSURE < 130 MM HG: ICD-10-PCS | Mod: CPTII,S$GLB,, | Performed by: FAMILY MEDICINE

## 2020-02-19 PROCEDURE — 3074F SYST BP LT 130 MM HG: CPT | Mod: CPTII,S$GLB,, | Performed by: FAMILY MEDICINE

## 2020-02-19 PROCEDURE — 3044F PR MOST RECENT HEMOGLOBIN A1C LEVEL <7.0%: ICD-10-PCS | Mod: CPTII,S$GLB,, | Performed by: FAMILY MEDICINE

## 2020-02-19 PROCEDURE — 99396 PR PREVENTIVE VISIT,EST,40-64: ICD-10-PCS | Mod: S$GLB,,, | Performed by: FAMILY MEDICINE

## 2020-02-19 PROCEDURE — 3044F HG A1C LEVEL LT 7.0%: CPT | Mod: CPTII,S$GLB,, | Performed by: FAMILY MEDICINE

## 2020-02-19 PROCEDURE — 99999 PR PBB SHADOW E&M-EST. PATIENT-LVL IV: ICD-10-PCS | Mod: PBBFAC,,, | Performed by: FAMILY MEDICINE

## 2020-02-19 PROCEDURE — 99396 PREV VISIT EST AGE 40-64: CPT | Mod: S$GLB,,, | Performed by: FAMILY MEDICINE

## 2020-02-19 PROCEDURE — 3078F DIAST BP <80 MM HG: CPT | Mod: CPTII,S$GLB,, | Performed by: FAMILY MEDICINE

## 2020-02-19 RX ORDER — TRIAMTERENE/HYDROCHLOROTHIAZID 37.5-25 MG
1 TABLET ORAL DAILY
Qty: 90 TABLET | Refills: 1 | Status: SHIPPED | OUTPATIENT
Start: 2020-02-19 | End: 2021-02-22 | Stop reason: SDUPTHER

## 2020-02-19 RX ORDER — METFORMIN HYDROCHLORIDE 500 MG/1
1000 TABLET, EXTENDED RELEASE ORAL
Qty: 180 TABLET | Refills: 3 | Status: SHIPPED | OUTPATIENT
Start: 2020-02-19 | End: 2021-02-22

## 2020-02-19 RX ORDER — DICLOFENAC SODIUM 10 MG/G
2 GEL TOPICAL 4 TIMES DAILY
Qty: 100 G | Refills: 0 | Status: SHIPPED | OUTPATIENT
Start: 2020-02-19 | End: 2020-09-22 | Stop reason: ALTCHOICE

## 2020-02-19 RX ORDER — VALACYCLOVIR HYDROCHLORIDE 1 G/1
500 TABLET, FILM COATED ORAL
Qty: 30 TABLET | Refills: 0 | Status: SHIPPED | OUTPATIENT
Start: 2020-02-19 | End: 2022-09-17 | Stop reason: SDUPTHER

## 2020-02-19 NOTE — PROGRESS NOTES
"Subjective:       Patient ID: Dina Martínez is a 49 y.o. female.    Chief Complaint: Annual Exam    HPI 48 y/o HTN, BILL has home CPAP (4 hours a night), L sided Plantar Fascitis, family hx of colon cancer, colon polyps, new onset DM2 is here for annual exam.     She has new diagnosis DM2, she is on Metformin 1000 mg daily, her weight is down 8 pounds, she is trying to eat healthy, she is active at work but not doing dedicated exercise. She continues to have bilateral foot pain, she has know plantar fasciitis, she is doing her morning exercises, she takes occasional aleve, ibuprofen or tylenol that helps some. She is sleeping good, wakes up rested. She denies v/d/constipation/cp/sob/urinary sx.       L sided Plantar Facisits: has been to podiatry in the past, has had injections and is using inserts without much relief, cymbalta caused fatigue  DM2: Metformin XR 1000 mg daily, a1c 6.5 1/2020  HTN: Maxzide 37.5-25 mg daily  Colonoscopy utd  Eye exam utd  Dental utd  Vaccines:utd  GYN: TONYA with BSO in 2015, pelvic due, mmg utd     Review of Systems   Constitutional: Negative for activity change, appetite change, fatigue and fever.   Respiratory: Negative for cough and shortness of breath.    Cardiovascular: Negative for chest pain, palpitations and leg swelling.   Gastrointestinal: Negative for constipation, diarrhea, nausea and vomiting.   Genitourinary: Negative for difficulty urinating and dysuria.   Musculoskeletal:        Bilateral foot pain   Skin: Negative for rash.   Neurological: Negative for dizziness and light-headedness.   Psychiatric/Behavioral: Negative for sleep disturbance.       Objective:      /76 (BP Location: Right arm, Patient Position: Sitting, BP Method: Large (Manual))   Pulse 85   Temp 98.1 °F (36.7 °C)   Ht 5' 6" (1.676 m)   Wt 125.5 kg (276 lb 12.6 oz)   LMP 10/25/2015   SpO2 97%   BMI 44.67 kg/m²   Physical Exam   Constitutional: She appears well-developed and " well-nourished.   HENT:   Head: Normocephalic and atraumatic.   Mouth/Throat: No oropharyngeal exudate.   Neck: Normal range of motion. Neck supple. No thyromegaly present.   Cardiovascular: Normal rate, regular rhythm and normal heart sounds.   Pulmonary/Chest: Effort normal and breath sounds normal. No respiratory distress.   Abdominal: Soft. Bowel sounds are normal. She exhibits no distension. There is no tenderness.   Musculoskeletal: She exhibits no edema.   Lymphadenopathy:     She has no cervical adenopathy.   Neurological: She is alert.   Skin: Skin is warm and dry.   Psychiatric: She has a normal mood and affect.   Nursing note and vitals reviewed.      Assessment:       1. Annual physical exam    2. Essential hypertension    3. Herpes labialis    4. Plantar fasciitis    5. New onset type 2 diabetes mellitus    6. Thyroid fullness        Plan:   Dina Robbins was seen today for annual exam.    Diagnoses and all orders for this visit:    Annual physical exam    Essential hypertension  -     triamterene-hydrochlorothiazide 37.5-25 mg (MAXZIDE-25) 37.5-25 mg per tablet; Take 1 tablet by mouth once daily.    Herpes labialis  -     valACYclovir (VALTREX) 1000 MG tablet; Take 0.5 tablets (500 mg total) by mouth as needed. Take 2 tablets by mouth twice a day x 1 day for outbreak    Plantar fasciitis  -     diclofenac sodium (VOLTAREN) 1 % Gel; Apply 2 g topically 4 (four) times daily.  -     Ambulatory referral/consult to Podiatry; Future    New onset type 2 diabetes mellitus  -     metFORMIN (GLUCOPHAGE-XR) 500 MG XR 24hr tablet; Take 2 tablets (1,000 mg total) by mouth daily with breakfast.  -     dulaglutide (TRULICITY) 0.75 mg/0.5 mL PnIj; Inject 0.5 mLs (0.75 mg total) into the skin every 7 days.  -     Hemoglobin A1c; Future    Thyroid fullness  -     US Soft Tissue Head Neck Thyroid; Future

## 2020-03-24 ENCOUNTER — TELEPHONE (OUTPATIENT)
Dept: OBSTETRICS AND GYNECOLOGY | Facility: CLINIC | Age: 50
End: 2020-03-24

## 2020-03-24 NOTE — TELEPHONE ENCOUNTER
----- Message from Drea Yoon MD sent at 3/23/2020  1:38 PM CDT -----  Patient needs annual exam scheduled in June.  She has not returned to clinic since her hysterectomy in 2016.

## 2020-03-31 ENCOUNTER — TELEPHONE (OUTPATIENT)
Dept: RADIOLOGY | Facility: HOSPITAL | Age: 50
End: 2020-03-31

## 2020-04-06 ENCOUNTER — LAB VISIT (OUTPATIENT)
Dept: LAB | Facility: HOSPITAL | Age: 50
End: 2020-04-06
Attending: FAMILY MEDICINE
Payer: COMMERCIAL

## 2020-04-06 DIAGNOSIS — E11.9 NEW ONSET TYPE 2 DIABETES MELLITUS: ICD-10-CM

## 2020-04-06 LAB
ESTIMATED AVG GLUCOSE: 131 MG/DL (ref 68–131)
HBA1C MFR BLD HPLC: 6.2 % (ref 4–5.6)

## 2020-04-06 PROCEDURE — 36415 COLL VENOUS BLD VENIPUNCTURE: CPT

## 2020-04-06 PROCEDURE — 83036 HEMOGLOBIN GLYCOSYLATED A1C: CPT

## 2020-04-21 ENCOUNTER — PATIENT MESSAGE (OUTPATIENT)
Dept: ADMINISTRATIVE | Facility: OTHER | Age: 50
End: 2020-04-21

## 2020-04-21 DIAGNOSIS — Z01.84 ANTIBODY RESPONSE EXAMINATION: ICD-10-CM

## 2020-04-22 ENCOUNTER — LAB VISIT (OUTPATIENT)
Dept: LAB | Facility: HOSPITAL | Age: 50
End: 2020-04-22
Attending: INTERNAL MEDICINE
Payer: COMMERCIAL

## 2020-04-22 DIAGNOSIS — Z01.84 ANTIBODY RESPONSE EXAMINATION: ICD-10-CM

## 2020-04-22 DIAGNOSIS — E11.9 TYPE 2 DIABETES MELLITUS: ICD-10-CM

## 2020-04-22 LAB — SARS-COV-2 IGG SERPL QL IA: NEGATIVE

## 2020-04-22 PROCEDURE — 36415 COLL VENOUS BLD VENIPUNCTURE: CPT

## 2020-04-22 PROCEDURE — 86769 SARS-COV-2 COVID-19 ANTIBODY: CPT

## 2020-05-09 ENCOUNTER — PATIENT MESSAGE (OUTPATIENT)
Dept: INTERNAL MEDICINE | Facility: CLINIC | Age: 50
End: 2020-05-09

## 2020-05-25 ENCOUNTER — PATIENT OUTREACH (OUTPATIENT)
Dept: OTHER | Facility: OTHER | Age: 50
End: 2020-05-25

## 2020-05-25 NOTE — LETTER
Zoë 10, 2020     Dina Martínez  [address] [place] [id number]       Dear Dina Robbins,    Welcome to [facility]! Our goal is to make care effective, proactive and convenient by using data you send us from home to better treat your chronic conditions.              My name is Faby Mars, and I am your dedicated Digital Medicine clinician. As an expert in medication management, I will help ensure that the medications you are taking continue to provide the intended benefits and help you reach your goals. You can reach me directly at [phone] or by sending me a message directly through your [facility] account.      I am Thea Barba and I will be your health . My job is to help you identify lifestyle changes to improve your disease control. We will talk about nutrition, exercise, and other ways you may be able to adjust your current habits to better your health. Additionally, we will help ensure you are completing the tests and screenings that are necessary to help manage your conditions. You can reach me directly at [phone] or by sending me a message directly through your [facility] account.    Most importantly, YOU are at the center of this team. Together, we will work to improve your overall health and encourage you to meet your goals for a healthier lifestyle.     What we expect from YOU:  · Please take frequent home blood sugar measurements according to the frequency your physician and Digital Medicine care team specify. It is important that your team see both fasting and after meal readings.      Be available to receive phone calls or [employer] messages, when appropriate, from your care team. Please let us know if there are any specific days or times that work best for us to reach you via phone.     Complete routine tests and screenings. Dont worry, we will help keep you on track!           What you should expect from your Digital Medicine Care Team:   We will  work with you to create a personalized plan of care and provide you with encouragement and education, including regarding lifestyle changes, that could help you manage your disease states.     We will adjust your current medications, if needed, and continue to monitor your long-term progress.     We will provide you and your physician with monthly progress reports after you have been in the program for more than 30 days.     We will send you reminders through [employer] and text messages to help ensure you do not miss any testing deadlines to help manage your disease states.    You will be able to reach us by phone or through your [employer] account by clicking our names under Care Team on the right side of the home screen.    I look forward to working with you to achieve your blood pressure goals!    We look forward to working with you to help manage your health,    Sincerely,    Your Digital Medicine Team    Please visit our websites to learn more:   · Diabetes: [email or web address]      Remember, we are not available for emergencies. If you have an emergency, please contact your doctors office directly or call [facility] on-call ([phone] or [phone]) or 911.    Diabetes: We want help you get important tests and screenings done regularly to assure that your health needs are met. We have put a new system in place, called CareTouch that will help us improve how we monitor and reach out to you about the following lab tests that you will need to help manage your diabetes.  · Hemoglobin A1c testing (Frequency: Every 3 to 6 months, dependent on A1c goal)  · Nephropathy Assessment, generally urine micro albumin testing (Frequency: Yearly)  · Eye exam through a quick 30-minute Eye Photo Exam (Frequency: 1-2 Years, depending on result)    When necessary you can come in to one of the labs on the attached page any weekday between 10:30 am and 4:00 pm to have your tests done prior to their due date.  Tell the  you received a CareTouch letter, or just look for the CareTouch sign.

## 2020-05-28 ENCOUNTER — PATIENT MESSAGE (OUTPATIENT)
Dept: OTHER | Facility: OTHER | Age: 50
End: 2020-05-28

## 2020-05-31 ENCOUNTER — PATIENT MESSAGE (OUTPATIENT)
Dept: INTERNAL MEDICINE | Facility: CLINIC | Age: 50
End: 2020-05-31

## 2020-06-10 NOTE — PROGRESS NOTES
Digital Medicine: Health  Introduction    Introduced Dina Martínez to Digital Medicine. Discussed health  role and recommended lifestyle modifications.    She reports that she is taking Metformin but not Trulicity. She was given a sample of Trulicity but hasn't been back to the doctor due to COVID. She feels like she is doing fine on Metformin only currently.     She is a  at [employer]. Works 12 hr shifts. She normally has a set schedule but there have been changes due to COVID.     She would prefer to discuss lifestyle factors in the future. She feels that she knows what to do. Encouraged her to continue that and reach out with any questions. BG readings have been at goal.     The history is provided by the patient.     DIABETES    Our goal is to decrease A1c within patient-specific target levels and make the process convenient so patient can avoid extra trips to the office. Reducing A1C by merely 1% results in a decreased risk of complications of at least 10%. For example, an A1C reduced from 8.5% to 7.5% results in almost 40% lower risk of kidney, eye, and nerve disease      Reviewed that the Digital Medicine care team - consisting of a clinician and a health  - will follow the most current evidence-based national guidelines for treating your condition.  The health  will focus on lifestyle modifications and motivation while the clinician will focus on medication therapy.  The care team will review all data on a regular basis and reach out as needed.      Explained that one of the key parts of the program is communication with the care team.  Asked patient to respond to outreach attempts and complete questionnaires.  Stressed importance of medication adherence.      Instructed patient not to allow anyone else to use phone and monitoring device.  Explained that we expect patient to test their blood sugar as prescribed by their physician or Digital Medicine Clinician.       Reviewed general Self-Monitoring of Blood Glucose (SMBG) goals:  · FPG: <  mg/dL  · 1h PPG: < 180 mg/dL  · 2h PPG: < 160 mg/dL  · Bedtime: < 150 mg/dL    Explained to patient that the digital medicine team is not available for emergencies.  Patient will call [facility] on-call ([phone] or [phone]) or 911 if needed.      Expected SMBG schedule: Daily  Once daily fasting or 2 hrs after largest meal    Reviewed signs and symptoms of hypoglycemia (weakness, dizziness, hunger, shakiness, nausea, headache, heart palpitations, sweating, fatigue, anxiety, etc.).  Reviewed treatment of hypoglycemia (15/15 rule).      Patient's A1C goal is less than or equal to 7.  Patient's most recent A1C result is at or below goal.  Lab Results     Component                Value               Date                     HGBA1C                   6.2 (H)             04/06/2020                      Last 6 Patient Entered Readings                                          Most Recent A1c:      Recent Readings 6/10/2020 6/9/2020 6/8/2020 6/6/2020 6/5/2020    Blood Glucose (mg/dL) 125 124 118 144 112          INTERVENTION(S)  reviewed monitoring technique and encouragement/support    PLAN  patient verbalizes understanding, additional monitoring needed and Clinician follow-up      There are no preventive care reminders to display for this patient.    Reviewed the importance of self-monitoring, medication adherence, and that the health  can be used as a resource for lifestyle modifications to help reduce or maintain a healthy lifestyle.    Sent link to [facility] [employer] [facility] webpages and my contact information via [employer] for future questions. Follow up scheduled.         Screenings    SDOH

## 2020-06-11 DIAGNOSIS — E11.9 TYPE 2 DIABETES MELLITUS: ICD-10-CM

## 2020-06-30 ENCOUNTER — TELEPHONE (OUTPATIENT)
Dept: OBSTETRICS AND GYNECOLOGY | Facility: CLINIC | Age: 50
End: 2020-06-30

## 2020-06-30 NOTE — TELEPHONE ENCOUNTER
I left a message for the pt to call the office back at [phone] to schedule her annual exam for august with Dr chiu per her request.

## 2020-06-30 NOTE — TELEPHONE ENCOUNTER
----- Message from Drea Yoon MD sent at 6/28/2020 10:56 PM CDT -----  Patient needs annul exam in August

## 2020-07-02 ENCOUNTER — TELEPHONE (OUTPATIENT)
Dept: OBSTETRICS AND GYNECOLOGY | Facility: CLINIC | Age: 50
End: 2020-07-02

## 2020-07-02 NOTE — TELEPHONE ENCOUNTER
I left a message for the pt to call the office back to schedule an annual exam in august per Dr Yoon.

## 2020-07-14 ENCOUNTER — PATIENT OUTREACH (OUTPATIENT)
Dept: OTHER | Facility: OTHER | Age: 50
End: 2020-07-14

## 2020-08-06 ENCOUNTER — PATIENT OUTREACH (OUTPATIENT)
Dept: OTHER | Facility: OTHER | Age: 50
End: 2020-08-06

## 2020-08-06 DIAGNOSIS — E11.9 NEW ONSET TYPE 2 DIABETES MELLITUS: Primary | ICD-10-CM

## 2020-08-10 ENCOUNTER — OFFICE VISIT (OUTPATIENT)
Dept: OBSTETRICS AND GYNECOLOGY | Facility: CLINIC | Age: 50
End: 2020-08-10
Payer: COMMERCIAL

## 2020-08-10 VITALS
WEIGHT: 277.75 LBS | HEIGHT: 64 IN | SYSTOLIC BLOOD PRESSURE: 130 MMHG | DIASTOLIC BLOOD PRESSURE: 86 MMHG | BODY MASS INDEX: 47.42 KG/M2

## 2020-08-10 DIAGNOSIS — R39.89 SUSPECTED UTI: Primary | ICD-10-CM

## 2020-08-10 PROCEDURE — 3008F PR BODY MASS INDEX (BMI) DOCUMENTED: ICD-10-PCS | Mod: CPTII,S$GLB,, | Performed by: NURSE PRACTITIONER

## 2020-08-10 PROCEDURE — 99203 OFFICE O/P NEW LOW 30 MIN: CPT | Mod: S$GLB,,, | Performed by: NURSE PRACTITIONER

## 2020-08-10 PROCEDURE — 3075F SYST BP GE 130 - 139MM HG: CPT | Mod: CPTII,S$GLB,, | Performed by: NURSE PRACTITIONER

## 2020-08-10 PROCEDURE — 99203 PR OFFICE/OUTPT VISIT, NEW, LEVL III, 30-44 MIN: ICD-10-PCS | Mod: S$GLB,,, | Performed by: NURSE PRACTITIONER

## 2020-08-10 PROCEDURE — 3075F PR MOST RECENT SYSTOLIC BLOOD PRESS GE 130-139MM HG: ICD-10-PCS | Mod: CPTII,S$GLB,, | Performed by: NURSE PRACTITIONER

## 2020-08-10 PROCEDURE — 87077 CULTURE AEROBIC IDENTIFY: CPT

## 2020-08-10 PROCEDURE — 99999 PR PBB SHADOW E&M-EST. PATIENT-LVL III: CPT | Mod: PBBFAC,,, | Performed by: NURSE PRACTITIONER

## 2020-08-10 PROCEDURE — 3079F DIAST BP 80-89 MM HG: CPT | Mod: CPTII,S$GLB,, | Performed by: NURSE PRACTITIONER

## 2020-08-10 PROCEDURE — 3008F BODY MASS INDEX DOCD: CPT | Mod: CPTII,S$GLB,, | Performed by: NURSE PRACTITIONER

## 2020-08-10 PROCEDURE — 87088 URINE BACTERIA CULTURE: CPT

## 2020-08-10 PROCEDURE — 87086 URINE CULTURE/COLONY COUNT: CPT

## 2020-08-10 PROCEDURE — 99999 PR PBB SHADOW E&M-EST. PATIENT-LVL III: ICD-10-PCS | Mod: PBBFAC,,, | Performed by: NURSE PRACTITIONER

## 2020-08-10 PROCEDURE — 87186 SC STD MICRODIL/AGAR DIL: CPT | Mod: 59

## 2020-08-10 PROCEDURE — 3079F PR MOST RECENT DIASTOLIC BLOOD PRESSURE 80-89 MM HG: ICD-10-PCS | Mod: CPTII,S$GLB,, | Performed by: NURSE PRACTITIONER

## 2020-08-10 RX ORDER — NITROFURANTOIN 25; 75 MG/1; MG/1
100 CAPSULE ORAL 2 TIMES DAILY
Qty: 10 CAPSULE | Refills: 0 | Status: SHIPPED | OUTPATIENT
Start: 2020-08-10 | End: 2020-08-15

## 2020-08-10 NOTE — PROGRESS NOTES
CC: Dysuria    HPI: Pt is a 49 y.o.  female who presents for evaluation of her UTI symptoms.   The patient presents today with suprapubic pain, frequency, and urgency for the past 3 weeks. The patient denies flank pain, fever, nausea, vomiting, and hematuria. She denies frequent or recurrent UTIs.   Alleviating factors: None    ROS:  GENERAL: Feeling well overall. Denies fever or chills.   SKIN: Denies rash or lesions.   HEAD: Denies head injury or headache.   NODES: Denies enlarged lymph nodes.   CHEST: Denies chest pain or shortness of breath.   CARDIOVASCULAR: Denies palpitations or left sided chest pain.   ABDOMEN: No abdominal pain, constipation, diarrhea, nausea, vomiting or rectal bleeding.   URINARY: + suprapubic pain, urinary urgency and frequency.  REPRODUCTIVE: See HPI.   BREASTS: Denies pain, lumps, or nipple discharge.   HEMATOLOGIC: No easy bruisability or excessive bleeding.   MUSCULOSKELETAL: Denies joint pain or swelling.   NEUROLOGIC: Denies syncope or weakness.   PSYCHIATRIC: Denies depression, anxiety or mood swings.    PE:   APPEARANCE: Well nourished, well developed, Black or  female in no acute distress.  PELVIS: Deferred  ABDOMEN: No suprapubic tenderness  MUSCULOSKELETAL: No CVA tenderness      Diagnosis:  1. Suspected UTI        Plan:     Orders Placed This Encounter    Urine culture    POCT URINE DIPSTICK WITHOUT MICROSCOPE    nitrofurantoin, macrocrystal-monohydrate, (MACROBID) 100 MG capsule     Urine dip: ++Leuk, +nitrites  Urine culture  Macrobid x 5 days    -UTI prevention including   a. perineal hygiene, wipe front to back,  b. drink water prior to intercourse and urinate afterwards and avoid certain positions which could increase likelyhood of UTIs,  c. establish regular bladder habits,   d. avoid vulvovaginal irritants,   e. increase fluids and avoid caffeine and alcohol;  -signs of pylenephritis and to go to the ED if develops fever, chills, n/v, back pain,  worsening dyuria, hematuria;   -pelvic rest until symptoms resolve;  -Macrobid use and potential side effects;  -A.C.S. Pap and pelvic exam guidelines (pap every 3 years), recomendations for yearly mammogram;  -to follow up with her PCP for other health maintenance.       Follow-up PRN no resolution of symptoms.      Brittany Rodriguez, PAULP-C

## 2020-08-14 DIAGNOSIS — N30.90 CYSTITIS: Primary | ICD-10-CM

## 2020-08-14 LAB
BACTERIA UR CULT: ABNORMAL
BACTERIA UR CULT: ABNORMAL

## 2020-08-14 RX ORDER — AMOXICILLIN AND CLAVULANATE POTASSIUM 875; 125 MG/1; MG/1
1 TABLET, FILM COATED ORAL 2 TIMES DAILY
Qty: 14 TABLET | Refills: 0 | Status: SHIPPED | OUTPATIENT
Start: 2020-08-14 | End: 2020-08-22

## 2020-09-03 ENCOUNTER — PATIENT OUTREACH (OUTPATIENT)
Dept: ADMINISTRATIVE | Facility: OTHER | Age: 50
End: 2020-09-03

## 2020-09-22 NOTE — PROGRESS NOTES
Digital Medicine: Clinician Introduction    Dina Martínez is a 49 y.o. female who is newly enrolled in the Digital Medicine Clinic.    Called to welcome patient to DD and to introduce myself. Reviewed goals of therapy, medication list/allergies, monitoring requirements and technique with patient. Works 6am to 6pm daily      The history is provided by the patient.      Review of patient's allergies indicates:  No Known Allergies  Completed Medication Reconciliation  Verified pharmacy information.  Patient is not on ACEI/ARB because not indicated.  Patient is not on statin because not indicated.      DIABETES  Explained the goal of the diabetes digital medicine program is to decrease A1c within patient-specific target levels. Reviewed benefits of A1c reduction including reducing risk for kidney, eye, and nerve disease.      Explained that we expect patient to submit blood sugar readings as prescribed. Instructed patient not to allow anyone else to use their glucometer and phone as data submitted is directly entered into their medical record. Reviewed and confirmed appropriate blood sugar testing technique.      Patient reported SMBG schedule: Daily.   Reviewed general Self-Monitoring of Blood Glucose (SMBG) goals:  · FP-130 mg/dL  · 2h PPG: <180 mg/dL  · Bedtime: <150 mg/dL    Reviewed signs or symptoms of hyperglycemia (headache, increased thirst, increased urination, fatigue, blurred vision, etc.).      Reviewed signs and symptoms of hypoglycemia (weakness, dizziness, hunger, shakiness, nausea, headache, heart palpitations, sweating, fatigue, anxiety, etc.).  Reviewed treatment of hypoglycemia (15/15 rule).      Patient's A1C goal is less than or equal to 7 per 2020 ADA guidelines. Patient's most recent A1C result is at goal. Lab Results     Component                Value               Date                     HGBA1C                   6.2 (H)             2020          .         Last 6 Patient  Entered Readings                                          Most Recent A1c: 6.2% on 4/6/2020  (Goal: 7%)     Recent Readings 9/21/2020 9/17/2020 9/13/2020 9/11/2020 9/11/2020    Blood Glucose (mg/dL) 119 131 111 114 111              Depression Screening  Dina Martínez screened negative on the depression screening.     Sleep Apnea Screening  Patient previously diagnosed with BILL and is not interested in a referral at this time.     She reports she is currently using CPAP for 4 hour(s) per night. BILL is managed effectively with CPAP use.      Medication Affordability Screening  Patient did not answer the medication affordability questionnaires.     Medication Adherence-Medication adherence was assessed.  Patient continue taking medication as prescribed.            ASSESSMENT(S)  Patient's A1C goal is less than or equal to 7 per 2020 ADA guidelines. Patient's most recent A1C result is at goal. Lab Results    Component                Value               Date                     HGBA1C                   6.2 (H)             04/06/2020          .       Diabetes Plan  Continue current therapy.  Continue current diet/physical activity routine.       Addressed patient questions and patient has my contact information if needed prior to next outreach. Patient verbalizes understanding.      Explained the importance of self-monitoring and medication adherence. Encouraged the patient to communicate with their health  for lifestyle modifications to help improve or maintain a healthy lifestyle.        Sent link to [facility] [employer] [facility] webpages and my contact information via [employer] for future questions.        Explained to the patient that the Digital Medicine team is not available for emergencies. Advised patient call [facility] ([phone] or [phone]) or [phone] if needed.           There are no preventive care reminders to display for this patient.     Current Medication Regimen:  Diabetes Medications              metFORMIN (GLUCOPHAGE-XR) 500 MG XR 24hr tablet Take 2 tablets (1,000 mg total) by mouth daily with breakfast.

## 2020-10-05 ENCOUNTER — LAB VISIT (OUTPATIENT)
Dept: LAB | Facility: HOSPITAL | Age: 50
End: 2020-10-05
Attending: FAMILY MEDICINE
Payer: COMMERCIAL

## 2020-10-05 ENCOUNTER — OFFICE VISIT (OUTPATIENT)
Dept: PRIMARY CARE CLINIC | Facility: CLINIC | Age: 50
End: 2020-10-05
Payer: COMMERCIAL

## 2020-10-05 VITALS
RESPIRATION RATE: 18 BRPM | OXYGEN SATURATION: 97 % | WEIGHT: 272.69 LBS | DIASTOLIC BLOOD PRESSURE: 80 MMHG | HEIGHT: 64 IN | HEART RATE: 90 BPM | SYSTOLIC BLOOD PRESSURE: 130 MMHG | BODY MASS INDEX: 46.55 KG/M2 | TEMPERATURE: 99 F

## 2020-10-05 DIAGNOSIS — I10 ESSENTIAL HYPERTENSION, BENIGN: ICD-10-CM

## 2020-10-05 DIAGNOSIS — E11.9 NEW ONSET TYPE 2 DIABETES MELLITUS: ICD-10-CM

## 2020-10-05 DIAGNOSIS — R42 DIZZINESS: Primary | ICD-10-CM

## 2020-10-05 PROBLEM — R11.0 NAUSEA: Status: RESOLVED | Noted: 2018-07-18 | Resolved: 2020-10-05

## 2020-10-05 PROCEDURE — 99213 OFFICE O/P EST LOW 20 MIN: CPT | Mod: S$GLB,,, | Performed by: FAMILY MEDICINE

## 2020-10-05 PROCEDURE — 3008F PR BODY MASS INDEX (BMI) DOCUMENTED: ICD-10-PCS | Mod: CPTII,S$GLB,, | Performed by: FAMILY MEDICINE

## 2020-10-05 PROCEDURE — 3008F BODY MASS INDEX DOCD: CPT | Mod: CPTII,S$GLB,, | Performed by: FAMILY MEDICINE

## 2020-10-05 PROCEDURE — 93005 EKG 12-LEAD: ICD-10-PCS | Mod: S$GLB,,, | Performed by: FAMILY MEDICINE

## 2020-10-05 PROCEDURE — 99999 PR PBB SHADOW E&M-EST. PATIENT-LVL IV: CPT | Mod: PBBFAC,,, | Performed by: FAMILY MEDICINE

## 2020-10-05 PROCEDURE — 3075F SYST BP GE 130 - 139MM HG: CPT | Mod: CPTII,S$GLB,, | Performed by: FAMILY MEDICINE

## 2020-10-05 PROCEDURE — 83036 HEMOGLOBIN GLYCOSYLATED A1C: CPT

## 2020-10-05 PROCEDURE — 3079F DIAST BP 80-89 MM HG: CPT | Mod: CPTII,S$GLB,, | Performed by: FAMILY MEDICINE

## 2020-10-05 PROCEDURE — 3079F PR MOST RECENT DIASTOLIC BLOOD PRESSURE 80-89 MM HG: ICD-10-PCS | Mod: CPTII,S$GLB,, | Performed by: FAMILY MEDICINE

## 2020-10-05 PROCEDURE — 3075F PR MOST RECENT SYSTOLIC BLOOD PRESS GE 130-139MM HG: ICD-10-PCS | Mod: CPTII,S$GLB,, | Performed by: FAMILY MEDICINE

## 2020-10-05 PROCEDURE — 99213 PR OFFICE/OUTPT VISIT, EST, LEVL III, 20-29 MIN: ICD-10-PCS | Mod: S$GLB,,, | Performed by: FAMILY MEDICINE

## 2020-10-05 PROCEDURE — 36415 COLL VENOUS BLD VENIPUNCTURE: CPT | Mod: PO

## 2020-10-05 PROCEDURE — 93005 ELECTROCARDIOGRAM TRACING: CPT | Mod: S$GLB,,, | Performed by: FAMILY MEDICINE

## 2020-10-05 PROCEDURE — 99999 PR PBB SHADOW E&M-EST. PATIENT-LVL IV: ICD-10-PCS | Mod: PBBFAC,,, | Performed by: FAMILY MEDICINE

## 2020-10-05 NOTE — PROGRESS NOTES
"Subjective:       Patient ID: Dina Martínez is a 50 y.o. female.    Chief Complaint: Hypertension (patient states her BP has been elevated the last couple of days), Headache, and Dizziness    Woke up feeling extremely dizzy yesterday morning, felt "like my head was spinning," heart was pounding, felt very nervous. Blood sugar was 122 at the time. Felt progressively better as the day went on yesterday, woke up with similar symptoms today, though milder. /90 at home. No chest pain or tightness, no SoB    Review of Systems   Constitutional: Negative for chills and fever.   Respiratory: Negative for shortness of breath.    Cardiovascular: Negative for chest pain.   Gastrointestinal: Negative for nausea and vomiting.   Genitourinary: Negative for difficulty urinating.   Skin: Negative for rash.   Allergic/Immunologic: Negative for immunocompromised state.   Neurological: Positive for dizziness and headaches.   Hematological: Does not bruise/bleed easily.   Psychiatric/Behavioral: Negative for agitation and confusion.       Objective:      Vitals:    10/05/20 1248 10/05/20 1317 10/05/20 1319 10/05/20 1321   BP: (!) 138/92 132/80 130/80 130/80   BP Location: Right arm Left arm Left arm Left arm   Patient Position: Sitting Lying Sitting Standing   BP Method: Large (Manual) Large (Manual) Large (Manual) Large (Manual)   Pulse: 88 82 82 90   Resp: 18      Temp: 98.6 °F (37 °C)      TempSrc: Oral      SpO2: 97%      Weight: 123.7 kg (272 lb 11.3 oz)      Height: 5' 4" (1.626 m)        Physical Exam    Lab Results   Component Value Date    WBC 5.72 08/07/2019    HGB 13.4 08/07/2019    HCT 41.4 08/07/2019     08/07/2019    CHOL 202 (H) 08/07/2019    TRIG 104 08/07/2019    HDL 58 08/07/2019    ALT 21 01/27/2020    AST 19 01/27/2020     01/27/2020    K 4.0 01/27/2020     01/27/2020    CREATININE 0.8 01/27/2020    BUN 10 01/27/2020    CO2 28 01/27/2020    TSH 1.167 01/27/2020    INR 0.9 09/15/2008    " HGBA1C 6.2 (H) 04/06/2020      Assessment:       1. Dizziness    2. Essential hypertension, benign        Plan:       Dizziness  -     EKG 12-lead    Essential hypertension, benign  -     EKG 12-lead    EKG and orthostatic VS unremarkable. Patient advised to start monitoring BP at home consistently, and to message her PCP for any new or persistent symptoms, or if BP consistently elevated  Medication List with Changes/Refills   Current Medications    ERGOCALCIFEROL, VITAMIN D2, (VITAMIN D ORAL)    Take 1 capsule by mouth once daily.    METFORMIN (GLUCOPHAGE-XR) 500 MG XR 24HR TABLET    Take 2 tablets (1,000 mg total) by mouth daily with breakfast.    MULTIVITAMIN CAPSULE    Take 1 capsule by mouth once daily.    ONDANSETRON (ZOFRAN-ODT) 4 MG TBDL    Dissolve 1 tablet (4 mg total) by mouth every 8 (eight) hours as needed.    TRIAMTERENE-HYDROCHLOROTHIAZIDE 37.5-25 MG (MAXZIDE-25) 37.5-25 MG PER TABLET    Take 1 tablet by mouth once daily.    VALACYCLOVIR (VALTREX) 1000 MG TABLET    Take 2 tablets by mouth twice a day x 1 day for outbreak

## 2020-10-06 LAB
ESTIMATED AVG GLUCOSE: 126 MG/DL (ref 68–131)
HBA1C MFR BLD HPLC: 6 % (ref 4–5.6)

## 2020-10-07 NOTE — PROGRESS NOTES
Digital Medicine: Health  Follow-Up    The history is provided by the patient.             Reason for review: Blood glucose at goal        Topics Covered on Call: physical activity    Additional Follow-up details: Patient reports doing well except for a dizzy spell Sunday night and last night. She was seen in office with normal EKG and BP. She states it occurs only when she wakes up. They think it may be vertigo. Her blood sugar was normal when dizzy spell occurred.             Diet-no change to diet    No change to diet.        Physical Activity-Change      She added walking to Her physical activity routine.        She identified the following barriers to physical activity: work schedule        Additional physical activity details: She walks in evenings and throughout the day at work. She would like to be more consistent with this but has been working 12-16 hr days. She is thinking about buying a bike this weekend.        Medication Adherence-Medication Adherence not addressed.      Substance, Sleep, Stress-Not assessed      Continue current diet/physical activity routine.       Addressed patient questions and patient has my contact information if needed prior to next outreach. Patient verbalizes understanding.      Explained the importance of self-monitoring and medication adherence. Encouraged the patient to communicate with their health  for lifestyle modifications to help improve or maintain a healthy lifestyle.               There are no preventive care reminders to display for this patient.        Last 6 Patient Entered Readings                                          Most Recent A1c: 6% on 10/5/2020  (Goal: 7%)     Recent Readings 10/6/2020 10/5/2020 10/4/2020 10/4/2020 10/4/2020    Blood Glucose (mg/dL) 134 131 122 104 126

## 2020-10-21 ENCOUNTER — TELEPHONE (OUTPATIENT)
Dept: INTERNAL MEDICINE | Facility: CLINIC | Age: 50
End: 2020-10-21

## 2020-10-22 NOTE — TELEPHONE ENCOUNTER
Please contact pt to book an apt to establish care - see when she needs to be seen - work her in before the end of the year.

## 2020-10-29 ENCOUNTER — PATIENT MESSAGE (OUTPATIENT)
Dept: INTERNAL MEDICINE | Facility: CLINIC | Age: 50
End: 2020-10-29

## 2020-11-12 ENCOUNTER — OFFICE VISIT (OUTPATIENT)
Dept: INTERNAL MEDICINE | Facility: CLINIC | Age: 50
End: 2020-11-12
Payer: COMMERCIAL

## 2020-11-12 VITALS
HEIGHT: 67 IN | WEIGHT: 275.81 LBS | OXYGEN SATURATION: 98 % | SYSTOLIC BLOOD PRESSURE: 110 MMHG | RESPIRATION RATE: 16 BRPM | TEMPERATURE: 98 F | BODY MASS INDEX: 43.29 KG/M2 | DIASTOLIC BLOOD PRESSURE: 70 MMHG | HEART RATE: 72 BPM

## 2020-11-12 DIAGNOSIS — Z80.0 FAMILY HISTORY OF COLON CANCER: ICD-10-CM

## 2020-11-12 DIAGNOSIS — I10 ESSENTIAL HYPERTENSION, BENIGN: ICD-10-CM

## 2020-11-12 DIAGNOSIS — E66.01 MORBID OBESITY WITH BMI OF 45.0-49.9, ADULT: ICD-10-CM

## 2020-11-12 DIAGNOSIS — E55.9 VITAMIN D DEFICIENCY: ICD-10-CM

## 2020-11-12 DIAGNOSIS — G47.30 SLEEP APNEA, UNSPECIFIED TYPE: ICD-10-CM

## 2020-11-12 DIAGNOSIS — Z00.00 ROUTINE GENERAL MEDICAL EXAMINATION AT A HEALTH CARE FACILITY: Primary | ICD-10-CM

## 2020-11-12 DIAGNOSIS — Z12.31 SCREENING MAMMOGRAM, ENCOUNTER FOR: ICD-10-CM

## 2020-11-12 DIAGNOSIS — G47.33 OBSTRUCTIVE SLEEP APNEA SYNDROME, SEVERE: ICD-10-CM

## 2020-11-12 PROCEDURE — 3078F PR MOST RECENT DIASTOLIC BLOOD PRESSURE < 80 MM HG: ICD-10-PCS | Mod: CPTII,S$GLB,, | Performed by: INTERNAL MEDICINE

## 2020-11-12 PROCEDURE — 1125F PR PAIN SEVERITY QUANTIFIED, PAIN PRESENT: ICD-10-PCS | Mod: S$GLB,,, | Performed by: INTERNAL MEDICINE

## 2020-11-12 PROCEDURE — 3008F BODY MASS INDEX DOCD: CPT | Mod: CPTII,S$GLB,, | Performed by: INTERNAL MEDICINE

## 2020-11-12 PROCEDURE — 1125F AMNT PAIN NOTED PAIN PRSNT: CPT | Mod: S$GLB,,, | Performed by: INTERNAL MEDICINE

## 2020-11-12 PROCEDURE — 99999 PR PBB SHADOW E&M-EST. PATIENT-LVL IV: CPT | Mod: PBBFAC,,, | Performed by: INTERNAL MEDICINE

## 2020-11-12 PROCEDURE — 99999 PR PBB SHADOW E&M-EST. PATIENT-LVL IV: ICD-10-PCS | Mod: PBBFAC,,, | Performed by: INTERNAL MEDICINE

## 2020-11-12 PROCEDURE — 3074F PR MOST RECENT SYSTOLIC BLOOD PRESSURE < 130 MM HG: ICD-10-PCS | Mod: CPTII,S$GLB,, | Performed by: INTERNAL MEDICINE

## 2020-11-12 PROCEDURE — 99214 PR OFFICE/OUTPT VISIT, EST, LEVL IV, 30-39 MIN: ICD-10-PCS | Mod: S$GLB,,, | Performed by: INTERNAL MEDICINE

## 2020-11-12 PROCEDURE — 3078F DIAST BP <80 MM HG: CPT | Mod: CPTII,S$GLB,, | Performed by: INTERNAL MEDICINE

## 2020-11-12 PROCEDURE — 3074F SYST BP LT 130 MM HG: CPT | Mod: CPTII,S$GLB,, | Performed by: INTERNAL MEDICINE

## 2020-11-12 PROCEDURE — 99214 OFFICE O/P EST MOD 30 MIN: CPT | Mod: S$GLB,,, | Performed by: INTERNAL MEDICINE

## 2020-11-12 PROCEDURE — 3008F PR BODY MASS INDEX (BMI) DOCUMENTED: ICD-10-PCS | Mod: CPTII,S$GLB,, | Performed by: INTERNAL MEDICINE

## 2020-11-12 RX ORDER — DICLOFENAC SODIUM 10 MG/G
2 GEL TOPICAL 4 TIMES DAILY
Qty: 100 G | Refills: 3 | Status: SHIPPED | OUTPATIENT
Start: 2020-11-12 | End: 2021-08-13

## 2020-11-12 RX ORDER — LORATADINE 10 MG/1
10 TABLET ORAL DAILY PRN
COMMUNITY
Start: 2020-11-12

## 2020-11-12 NOTE — PROGRESS NOTES
Chief Complaint: Establish care.     HPI:THis is a 50 year old woman who presnets to establish care.    She has had dizziness the last 3 weeks. Occurs when she is lying in the bed and rolls over. Left side is worse than right side.  Symptoms have improved but not resolved. Not having a lot of sinus congestion. No sore throat or ear pain.     She has had left foot pain.   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 She does lots of walking. She can have severe pain when she walks too much.  SHe takes ibuprofen 800 mg twice a day which helps.  The ibuprofen does not upset her stomach.  She puts an ice pack on the top of the foot somewhat.  NO foot injury.  She had  plantar fasciitis in 2016.    She gets fever blisters once a year and valtrex 1000 2 tablets twice daily for only one day which works.     She has sleep apnea. HEr sleep study 8/1/16 she has severe obstructive sleep apnea. She has a CPAP machine.  She puts the machine on at times. She forgets to put it on.  When she puts the machine on, she wears it for 6 hours. She feels better when she wears the machine.  THe mask and machine does not bother her.     She has diabetes that is controlled on metformin  mg 2 tablets daily. Her blood pressure is controlled on triamterene hct 37.5/25 one tablet daily.  NO chest pain, shortness of breath, paplitations.     Past Medical History:   Diagnosis Date    Diabetes mellitus     Hypertension     Obstructive sleep apnea syndrome, severe          Past Surgical History:   Procedure Laterality Date    COLONOSCOPY N/A 8/22/2018    Procedure: COLONOSCOPY;  Surgeon: Bianca Cooney MD;  Location: [facility];  Service: Endoscopy;  Laterality: N/A;    ENDOMETRIAL ABLATION      ESOPHAGOGASTRODUODENOSCOPY N/A 6/27/2018    Procedure: ESOPHAGOGASTRODUODENOSCOPY (EGD);  Surgeon: Bianca Cooney MD;  Location: [facility];  Service: Endoscopy;  Laterality: N/A;    TOTAL ABDOMINAL HYSTERECTOMY W/  BILATERAL SALPINGOOPHORECTOMY  2015     Social History     Socioeconomic History    Marital status:      Spouse name: Not on file    Number of children: Not on file    Years of education: Not on file    Highest education level: Not on file   Occupational History    Occupation: security   Social Needs    Financial resource strain: Not hard at all    Food insecurity     Worry: Never true     Inability: Never true    Transportation needs     Medical: No     Non-medical: No   Tobacco Use    Smoking status: Never Smoker    Smokeless tobacco: Never Used   Substance and Sexual Activity    Alcohol use: Yes     Alcohol/week: 1.0 standard drinks     Types: 1 Cans of beer per week     Frequency: Monthly or less     Drinks per session: 1 or 2     Binge frequency: Never     Comment: 1-2 drinks a week    Drug use: No    Sexual activity: Never     Birth control/protection: None   Lifestyle    Physical activity     Days per week: Not on file     Minutes per session: Not on file    Stress: Not on file   Relationships    Social connections     Talks on phone: Once a week     Gets together: Never     Attends Congregational service: Never     Active member of club or organization: No     Attends meetings of clubs or organizations: Never     Relationship status: Living with partner   Other Topics Concern    Are you pregnant or think you may be? Not Asked    Breast-feeding Not Asked   Social History Narrative    Not on file     Family Status   Relation Name Status    Father      Mother  Alive    MGM      MGF      PGM      PGF      Brother  Alive    Neg Hx  (Not Specified)           Meds and allergies: updated on Epic    REVIEW OF SYSTEMS: No fevers, chills, night sweats, fatigue, visual change, hearing loss, sinus congestion, sore throat, chest pain, shortness of breath, nausea, vomiting, constipation, diarrhea, dysuria, hematuria, polydipsia, polyuria, other  "joint pain, muscle pain, headaches, anxiety, depression, insomnia.     She has some right knee pain at times.     No hot flashes    Physical exam:  /70 (BP Location: Right arm, Patient Position: Sitting, BP Method: Large (Manual))   Pulse 72   Temp 97.7 °F (36.5 °C)   Resp 16   Ht 5' 7" (1.702 m)   Wt 125.1 kg (275 lb 12.7 oz)   LMP 10/25/2015   SpO2 98%   BMI 43.20 kg/m²     General: alert, oriented x 3, no apparent distress.  Affect normal  HEENT: Conjunctivae: anicteric, PERRL, EOMI, TM clear fluid on left, normal on right, Oralpharynx clear  Neck: supple, no thyroid enlargement, no cervical lymphadenopathy  Resp: effort normal, lungs clear bilaterally  CV: Regular rate and rhythm without murmurs, gallops or rubs, no lower extremity edema,   Abdomen: soft, non-distended, non-tender, bowel sounds present, no hepatosplenomegaly.    Protective Sensation (w/ 10 gram monofilament):  Right: Intact  Left: Intact    Visual Inspection:  Normal -  Bilateral. Flat footed. No caluses    Pedal Pulses:   Right: Present  Left: Present    Posterior tibialis:   Right:Present  Left: Present    Labs 10/5/20 reviewed        Assessment/Plan:  Dizziness - due to inner ear issue. Over the counter loratadine 10 mg daily  Foot pain - likely plantar fasciitis - stretching exercises demonstrated. Ice with frozen water bottle twice daily. Diclofenac gel three times daily  HTN - controlled  Diabetes - controlled  Vitamin D deficiency - on replacement  Colonoscopy due 2018 - normal - due in 5 years - father  of colon cancer at age 74.  EGD normal 18  MMG 2020    Spent greater than 40 minutes with the patient, greater than 50% in face to face counseling.    "

## 2020-11-13 ENCOUNTER — TELEPHONE (OUTPATIENT)
Dept: INTERNAL MEDICINE | Facility: CLINIC | Age: 50
End: 2020-11-13

## 2020-11-13 DIAGNOSIS — G47.33 OBSTRUCTIVE SLEEP APNEA: Primary | ICD-10-CM

## 2020-11-18 ENCOUNTER — TELEPHONE (OUTPATIENT)
Dept: INTERNAL MEDICINE | Facility: CLINIC | Age: 50
End: 2020-11-18

## 2020-11-19 ENCOUNTER — PATIENT OUTREACH (OUTPATIENT)
Dept: OTHER | Facility: OTHER | Age: 50
End: 2020-11-19

## 2020-11-25 ENCOUNTER — TELEPHONE (OUTPATIENT)
Dept: INTERNAL MEDICINE | Facility: CLINIC | Age: 50
End: 2020-11-25

## 2020-12-11 ENCOUNTER — PATIENT MESSAGE (OUTPATIENT)
Dept: OTHER | Facility: OTHER | Age: 50
End: 2020-12-11

## 2020-12-21 NOTE — PROGRESS NOTES
Digital Medicine: Health  Follow-Up    The history is provided by the patient.             Reason for review: Blood glucose at goal        Topics Covered on Call: physical activity, Diet and health maintenance, diabetic supplies    Additional Follow-up details: Patient reports doing well without concern.     Patient states she has not gotten her diabetic eye exam done in more than a year. She will check her MyChart for health maintenance.    She states she will run out of test strips soon. Provided HME phone number and encouraged her to call.             Diet-no change to diet    No change to diet.  Patient reports eating or drinking the following: Patient states BG occasionally elevates if she has eaten certain foods. She states if this occurs she takes an extra metformin.       Physical Activity-Change  3 day(s) a week.     She added walking, stair stepper to Her physical activity routine.        Additional physical activity details: She has been walking 3x/week and using stair stepper at home.       Medication Adherence-Medication Adherence not addressed.      Substance, Sleep, Stress-Not assessed      Additional monitoring needed.  Continue current diet/physical activity routine.       Addressed patient questions and patient has my contact information if needed prior to next outreach. Patient verbalizes understanding.      Explained the importance of self-monitoring and medication adherence. Encouraged the patient to communicate with their health  for lifestyle modifications to help improve or maintain a healthy lifestyle.               There are no preventive care reminders to display for this patient.        Last 6 Patient Entered Readings                                          Most Recent A1c: 6% on 10/5/2020  (Goal: 7%)     Recent Readings 12/20/2020 12/20/2020 12/17/2020 12/14/2020 12/12/2020    Blood Glucose (mg/dL) 139 158 112 107 118

## 2020-12-22 ENCOUNTER — IMMUNIZATION (OUTPATIENT)
Dept: PRIMARY CARE CLINIC | Facility: CLINIC | Age: 50
End: 2020-12-22
Payer: COMMERCIAL

## 2020-12-22 DIAGNOSIS — Z23 NEED FOR VACCINATION: ICD-10-CM

## 2020-12-22 PROCEDURE — 91300 COVID-19, MRNA, LNP-S, PF, 30 MCG/0.3 ML DOSE VACCINE: CPT | Mod: S$GLB,,, | Performed by: FAMILY MEDICINE

## 2020-12-22 PROCEDURE — 0001A COVID-19, MRNA, LNP-S, PF, 30 MCG/0.3 ML DOSE VACCINE: ICD-10-PCS | Mod: CV19,S$GLB,, | Performed by: FAMILY MEDICINE

## 2020-12-22 PROCEDURE — 91300 COVID-19, MRNA, LNP-S, PF, 30 MCG/0.3 ML DOSE VACCINE: ICD-10-PCS | Mod: S$GLB,,, | Performed by: FAMILY MEDICINE

## 2020-12-22 PROCEDURE — 0001A COVID-19, MRNA, LNP-S, PF, 30 MCG/0.3 ML DOSE VACCINE: CPT | Mod: CV19,S$GLB,, | Performed by: FAMILY MEDICINE

## 2021-01-14 ENCOUNTER — IMMUNIZATION (OUTPATIENT)
Dept: PRIMARY CARE CLINIC | Facility: CLINIC | Age: 51
End: 2021-01-14
Payer: COMMERCIAL

## 2021-01-14 DIAGNOSIS — Z23 NEED FOR VACCINATION: ICD-10-CM

## 2021-01-14 PROCEDURE — 91300 COVID-19, MRNA, LNP-S, PF, 30 MCG/0.3 ML DOSE VACCINE: ICD-10-PCS | Mod: S$GLB,,, | Performed by: FAMILY MEDICINE

## 2021-01-14 PROCEDURE — 0002A COVID-19, MRNA, LNP-S, PF, 30 MCG/0.3 ML DOSE VACCINE: CPT | Mod: S$GLB,,, | Performed by: FAMILY MEDICINE

## 2021-01-14 PROCEDURE — 91300 COVID-19, MRNA, LNP-S, PF, 30 MCG/0.3 ML DOSE VACCINE: CPT | Mod: S$GLB,,, | Performed by: FAMILY MEDICINE

## 2021-01-14 PROCEDURE — 0002A COVID-19, MRNA, LNP-S, PF, 30 MCG/0.3 ML DOSE VACCINE: ICD-10-PCS | Mod: S$GLB,,, | Performed by: FAMILY MEDICINE

## 2021-02-18 ENCOUNTER — HOSPITAL ENCOUNTER (OUTPATIENT)
Dept: RADIOLOGY | Facility: HOSPITAL | Age: 51
Discharge: HOME | End: 2021-02-18
Attending: INTERNAL MEDICINE
Payer: COMMERCIAL

## 2021-02-18 VITALS — HEIGHT: 67 IN | BODY MASS INDEX: 43.16 KG/M2 | WEIGHT: 275 LBS

## 2021-02-18 DIAGNOSIS — Z12.31 SCREENING MAMMOGRAM, ENCOUNTER FOR: ICD-10-CM

## 2021-02-18 PROCEDURE — 77067 SCR MAMMO BI INCL CAD: CPT | Mod: TC

## 2021-02-18 PROCEDURE — 77063 BREAST TOMOSYNTHESIS BI: CPT | Mod: 26,,, | Performed by: RADIOLOGY

## 2021-02-18 PROCEDURE — 77063 MAMMO DIGITAL SCREENING BILAT WITH TOMO: ICD-10-PCS | Mod: 26,,, | Performed by: RADIOLOGY

## 2021-02-18 PROCEDURE — 77067 MAMMO DIGITAL SCREENING BILAT WITH TOMO: ICD-10-PCS | Mod: 26,,, | Performed by: RADIOLOGY

## 2021-02-18 PROCEDURE — 77067 SCR MAMMO BI INCL CAD: CPT | Mod: 26,,, | Performed by: RADIOLOGY

## 2021-02-22 ENCOUNTER — HOSPITAL ENCOUNTER (OUTPATIENT)
Dept: RADIOLOGY | Facility: HOSPITAL | Age: 51
Discharge: HOME | End: 2021-02-22
Attending: INTERNAL MEDICINE
Payer: COMMERCIAL

## 2021-02-22 ENCOUNTER — OFFICE VISIT (OUTPATIENT)
Dept: INTERNAL MEDICINE | Facility: CLINIC | Age: 51
End: 2021-02-22
Payer: COMMERCIAL

## 2021-02-22 VITALS
BODY MASS INDEX: 43.82 KG/M2 | RESPIRATION RATE: 18 BRPM | DIASTOLIC BLOOD PRESSURE: 80 MMHG | HEIGHT: 66 IN | OXYGEN SATURATION: 98 % | HEART RATE: 78 BPM | SYSTOLIC BLOOD PRESSURE: 120 MMHG | WEIGHT: 272.69 LBS | TEMPERATURE: 99 F

## 2021-02-22 DIAGNOSIS — M79.672 LEFT FOOT PAIN: ICD-10-CM

## 2021-02-22 DIAGNOSIS — E11.9 NEW ONSET TYPE 2 DIABETES MELLITUS: ICD-10-CM

## 2021-02-22 DIAGNOSIS — I10 ESSENTIAL HYPERTENSION: ICD-10-CM

## 2021-02-22 DIAGNOSIS — Z00.00 ROUTINE GENERAL MEDICAL EXAMINATION AT A HEALTH CARE FACILITY: Primary | ICD-10-CM

## 2021-02-22 PROCEDURE — 73610 X-RAY EXAM OF ANKLE: CPT | Mod: TC,LT

## 2021-02-22 PROCEDURE — 1125F AMNT PAIN NOTED PAIN PRSNT: CPT | Mod: S$GLB,,, | Performed by: INTERNAL MEDICINE

## 2021-02-22 PROCEDURE — 73630 X-RAY EXAM OF FOOT: CPT | Mod: TC,LT

## 2021-02-22 PROCEDURE — 3044F PR MOST RECENT HEMOGLOBIN A1C LEVEL <7.0%: ICD-10-PCS | Mod: CPTII,S$GLB,, | Performed by: INTERNAL MEDICINE

## 2021-02-22 PROCEDURE — 3079F PR MOST RECENT DIASTOLIC BLOOD PRESSURE 80-89 MM HG: ICD-10-PCS | Mod: CPTII,S$GLB,, | Performed by: INTERNAL MEDICINE

## 2021-02-22 PROCEDURE — 99396 PR PREVENTIVE VISIT,EST,40-64: ICD-10-PCS | Mod: S$GLB,,, | Performed by: INTERNAL MEDICINE

## 2021-02-22 PROCEDURE — 3008F BODY MASS INDEX DOCD: CPT | Mod: CPTII,S$GLB,, | Performed by: INTERNAL MEDICINE

## 2021-02-22 PROCEDURE — 3044F HG A1C LEVEL LT 7.0%: CPT | Mod: CPTII,S$GLB,, | Performed by: INTERNAL MEDICINE

## 2021-02-22 PROCEDURE — 3008F PR BODY MASS INDEX (BMI) DOCUMENTED: ICD-10-PCS | Mod: CPTII,S$GLB,, | Performed by: INTERNAL MEDICINE

## 2021-02-22 PROCEDURE — 99396 PREV VISIT EST AGE 40-64: CPT | Mod: S$GLB,,, | Performed by: INTERNAL MEDICINE

## 2021-02-22 PROCEDURE — 73630 X-RAY EXAM OF FOOT: CPT | Mod: 26,LT,, | Performed by: RADIOLOGY

## 2021-02-22 PROCEDURE — 99999 PR PBB SHADOW E&M-EST. PATIENT-LVL IV: ICD-10-PCS | Mod: PBBFAC,,, | Performed by: INTERNAL MEDICINE

## 2021-02-22 PROCEDURE — 99999 PR PBB SHADOW E&M-EST. PATIENT-LVL IV: CPT | Mod: PBBFAC,,, | Performed by: INTERNAL MEDICINE

## 2021-02-22 PROCEDURE — 3074F PR MOST RECENT SYSTOLIC BLOOD PRESSURE < 130 MM HG: ICD-10-PCS | Mod: CPTII,S$GLB,, | Performed by: INTERNAL MEDICINE

## 2021-02-22 PROCEDURE — 3074F SYST BP LT 130 MM HG: CPT | Mod: CPTII,S$GLB,, | Performed by: INTERNAL MEDICINE

## 2021-02-22 PROCEDURE — 73630 XR FOOT COMPLETE 3 VIEW LEFT: ICD-10-PCS | Mod: 26,LT,, | Performed by: RADIOLOGY

## 2021-02-22 PROCEDURE — 1125F PR PAIN SEVERITY QUANTIFIED, PAIN PRESENT: ICD-10-PCS | Mod: S$GLB,,, | Performed by: INTERNAL MEDICINE

## 2021-02-22 PROCEDURE — 73610 XR ANKLE COMPLETE 3 VIEW LEFT: ICD-10-PCS | Mod: 26,LT,, | Performed by: RADIOLOGY

## 2021-02-22 PROCEDURE — 73610 X-RAY EXAM OF ANKLE: CPT | Mod: 26,LT,, | Performed by: RADIOLOGY

## 2021-02-22 PROCEDURE — 3079F DIAST BP 80-89 MM HG: CPT | Mod: CPTII,S$GLB,, | Performed by: INTERNAL MEDICINE

## 2021-02-22 RX ORDER — TRIAMTERENE/HYDROCHLOROTHIAZID 37.5-25 MG
1 TABLET ORAL DAILY
Qty: 90 TABLET | Refills: 1 | Status: SHIPPED | OUTPATIENT
Start: 2021-02-22 | End: 2022-02-11 | Stop reason: SDUPTHER

## 2021-02-22 RX ORDER — DULAGLUTIDE 0.75 MG/.5ML
0.75 INJECTION, SOLUTION SUBCUTANEOUS
Qty: 2 ML | Refills: 5 | Status: SHIPPED | OUTPATIENT
Start: 2021-02-22 | End: 2021-05-03

## 2021-02-25 ENCOUNTER — PATIENT OUTREACH (OUTPATIENT)
Dept: ADMINISTRATIVE | Facility: OTHER | Age: 51
End: 2021-02-25

## 2021-02-26 ENCOUNTER — OFFICE VISIT (OUTPATIENT)
Dept: ORTHOPEDICS | Facility: CLINIC | Age: 51
End: 2021-02-26
Payer: COMMERCIAL

## 2021-02-26 DIAGNOSIS — M79.672 LEFT FOOT PAIN: ICD-10-CM

## 2021-02-26 DIAGNOSIS — M66.872 NONTRAUMATIC RUPTURE OF LEFT POSTERIOR TIBIAL TENDON: Primary | ICD-10-CM

## 2021-02-26 DIAGNOSIS — M79.2 NEUROGENIC PAIN OF FOOT, LEFT: ICD-10-CM

## 2021-02-26 DIAGNOSIS — M25.572 PAIN OF JOINT OF LEFT ANKLE AND FOOT: ICD-10-CM

## 2021-02-26 PROCEDURE — 99999 PR PBB SHADOW E&M-EST. PATIENT-LVL II: CPT | Mod: PBBFAC,,, | Performed by: ORTHOPAEDIC SURGERY

## 2021-02-26 PROCEDURE — 99214 OFFICE O/P EST MOD 30 MIN: CPT | Mod: S$GLB,,, | Performed by: ORTHOPAEDIC SURGERY

## 2021-02-26 PROCEDURE — 99214 PR OFFICE/OUTPT VISIT, EST, LEVL IV, 30-39 MIN: ICD-10-PCS | Mod: S$GLB,,, | Performed by: ORTHOPAEDIC SURGERY

## 2021-02-26 PROCEDURE — 99999 PR PBB SHADOW E&M-EST. PATIENT-LVL II: ICD-10-PCS | Mod: PBBFAC,,, | Performed by: ORTHOPAEDIC SURGERY

## 2021-02-26 RX ORDER — GABAPENTIN 300 MG/1
600 CAPSULE ORAL NIGHTLY
Qty: 60 CAPSULE | Refills: 11 | Status: SHIPPED | OUTPATIENT
Start: 2021-02-26 | End: 2021-05-03 | Stop reason: SDUPTHER

## 2021-03-02 ENCOUNTER — TELEPHONE (OUTPATIENT)
Dept: ORTHOPEDICS | Facility: CLINIC | Age: 51
End: 2021-03-02

## 2021-03-04 ENCOUNTER — HOSPITAL ENCOUNTER (OUTPATIENT)
Dept: RADIOLOGY | Facility: HOSPITAL | Age: 51
Discharge: HOME | End: 2021-03-04
Attending: ORTHOPAEDIC SURGERY
Payer: COMMERCIAL

## 2021-03-04 DIAGNOSIS — M66.872 NONTRAUMATIC RUPTURE OF LEFT POSTERIOR TIBIAL TENDON: ICD-10-CM

## 2021-03-04 DIAGNOSIS — M79.672 LEFT FOOT PAIN: ICD-10-CM

## 2021-03-04 DIAGNOSIS — M25.572 PAIN OF JOINT OF LEFT ANKLE AND FOOT: ICD-10-CM

## 2021-03-04 PROCEDURE — 73721 MRI JNT OF LWR EXTRE W/O DYE: CPT | Mod: 26,LT,, | Performed by: RADIOLOGY

## 2021-03-04 PROCEDURE — 73721 MRI ANKLE WITHOUT CONTRAST LEFT: ICD-10-PCS | Mod: 26,LT,, | Performed by: RADIOLOGY

## 2021-03-04 PROCEDURE — 73721 MRI JNT OF LWR EXTRE W/O DYE: CPT | Mod: TC,LT

## 2021-03-11 ENCOUNTER — PATIENT MESSAGE (OUTPATIENT)
Dept: ORTHOPEDICS | Facility: CLINIC | Age: 51
End: 2021-03-11

## 2021-03-14 ENCOUNTER — PATIENT MESSAGE (OUTPATIENT)
Dept: ORTHOPEDICS | Facility: CLINIC | Age: 51
End: 2021-03-14

## 2021-03-15 ENCOUNTER — PATIENT MESSAGE (OUTPATIENT)
Dept: ORTHOPEDICS | Facility: CLINIC | Age: 51
End: 2021-03-15

## 2021-03-26 ENCOUNTER — OFFICE VISIT (OUTPATIENT)
Dept: ORTHOPEDICS | Facility: CLINIC | Age: 51
End: 2021-03-26
Payer: COMMERCIAL

## 2021-03-26 ENCOUNTER — PATIENT MESSAGE (OUTPATIENT)
Dept: ORTHOPEDICS | Facility: CLINIC | Age: 51
End: 2021-03-26

## 2021-03-26 VITALS — BODY MASS INDEX: 43.71 KG/M2 | WEIGHT: 272 LBS | HEIGHT: 66 IN

## 2021-03-26 DIAGNOSIS — M76.822 POSTERIOR TIBIAL TENDINITIS, LEFT: Primary | ICD-10-CM

## 2021-03-26 PROCEDURE — 99213 PR OFFICE/OUTPT VISIT, EST, LEVL III, 20-29 MIN: ICD-10-PCS | Mod: S$GLB,,, | Performed by: ORTHOPAEDIC SURGERY

## 2021-03-26 PROCEDURE — 99999 PR PBB SHADOW E&M-EST. PATIENT-LVL III: CPT | Mod: PBBFAC,,, | Performed by: ORTHOPAEDIC SURGERY

## 2021-03-26 PROCEDURE — 99213 OFFICE O/P EST LOW 20 MIN: CPT | Mod: S$GLB,,, | Performed by: ORTHOPAEDIC SURGERY

## 2021-03-26 PROCEDURE — 99999 PR PBB SHADOW E&M-EST. PATIENT-LVL III: ICD-10-PCS | Mod: PBBFAC,,, | Performed by: ORTHOPAEDIC SURGERY

## 2021-03-26 PROCEDURE — 1125F PR PAIN SEVERITY QUANTIFIED, PAIN PRESENT: ICD-10-PCS | Mod: S$GLB,,, | Performed by: ORTHOPAEDIC SURGERY

## 2021-03-26 PROCEDURE — 3008F BODY MASS INDEX DOCD: CPT | Mod: CPTII,S$GLB,, | Performed by: ORTHOPAEDIC SURGERY

## 2021-03-26 PROCEDURE — 3008F PR BODY MASS INDEX (BMI) DOCUMENTED: ICD-10-PCS | Mod: CPTII,S$GLB,, | Performed by: ORTHOPAEDIC SURGERY

## 2021-03-26 PROCEDURE — 1125F AMNT PAIN NOTED PAIN PRSNT: CPT | Mod: S$GLB,,, | Performed by: ORTHOPAEDIC SURGERY

## 2021-03-26 RX ORDER — METHYLPREDNISOLONE 4 MG/1
TABLET ORAL
Qty: 1 PACKAGE | Refills: 0 | Status: SHIPPED | OUTPATIENT
Start: 2021-03-26 | End: 2021-08-13

## 2021-04-05 ENCOUNTER — PATIENT MESSAGE (OUTPATIENT)
Dept: ORTHOPEDICS | Facility: CLINIC | Age: 51
End: 2021-04-05

## 2021-04-15 ENCOUNTER — PATIENT MESSAGE (OUTPATIENT)
Dept: OTHER | Facility: OTHER | Age: 51
End: 2021-04-15

## 2021-04-15 DIAGNOSIS — R11.0 NAUSEA: ICD-10-CM

## 2021-04-16 RX ORDER — ONDANSETRON 4 MG/1
4 TABLET, ORALLY DISINTEGRATING ORAL EVERY 8 HOURS PRN
Qty: 30 TABLET | Refills: 2 | Status: SHIPPED | OUTPATIENT
Start: 2021-04-16 | End: 2023-11-25 | Stop reason: SDUPTHER

## 2021-04-22 ENCOUNTER — PATIENT MESSAGE (OUTPATIENT)
Dept: OTHER | Facility: OTHER | Age: 51
End: 2021-04-22

## 2021-05-03 ENCOUNTER — OFFICE VISIT (OUTPATIENT)
Dept: INTERNAL MEDICINE | Facility: CLINIC | Age: 51
End: 2021-05-03
Payer: COMMERCIAL

## 2021-05-03 VITALS
HEART RATE: 88 BPM | OXYGEN SATURATION: 97 % | HEIGHT: 67 IN | SYSTOLIC BLOOD PRESSURE: 130 MMHG | BODY MASS INDEX: 44.33 KG/M2 | DIASTOLIC BLOOD PRESSURE: 78 MMHG | WEIGHT: 282.44 LBS

## 2021-05-03 DIAGNOSIS — E66.01 MORBID OBESITY WITH BMI OF 45.0-49.9, ADULT: ICD-10-CM

## 2021-05-03 DIAGNOSIS — I10 ESSENTIAL HYPERTENSION: Primary | ICD-10-CM

## 2021-05-03 DIAGNOSIS — I10 ESSENTIAL HYPERTENSION, BENIGN: ICD-10-CM

## 2021-05-03 DIAGNOSIS — G47.33 OBSTRUCTIVE SLEEP APNEA: ICD-10-CM

## 2021-05-03 DIAGNOSIS — E11.9 TYPE 2 DIABETES MELLITUS WITHOUT COMPLICATION, WITHOUT LONG-TERM CURRENT USE OF INSULIN: ICD-10-CM

## 2021-05-03 DIAGNOSIS — M79.2 NEUROGENIC PAIN OF FOOT, LEFT: ICD-10-CM

## 2021-05-03 PROCEDURE — 99999 PR PBB SHADOW E&M-EST. PATIENT-LVL III: CPT | Mod: PBBFAC,,, | Performed by: INTERNAL MEDICINE

## 2021-05-03 PROCEDURE — 3008F PR BODY MASS INDEX (BMI) DOCUMENTED: ICD-10-PCS | Mod: CPTII,S$GLB,, | Performed by: INTERNAL MEDICINE

## 2021-05-03 PROCEDURE — 1126F PR PAIN SEVERITY QUANTIFIED, NO PAIN PRESENT: ICD-10-PCS | Mod: S$GLB,,, | Performed by: INTERNAL MEDICINE

## 2021-05-03 PROCEDURE — 99999 PR PBB SHADOW E&M-EST. PATIENT-LVL III: ICD-10-PCS | Mod: PBBFAC,,, | Performed by: INTERNAL MEDICINE

## 2021-05-03 PROCEDURE — 3008F BODY MASS INDEX DOCD: CPT | Mod: CPTII,S$GLB,, | Performed by: INTERNAL MEDICINE

## 2021-05-03 PROCEDURE — 99214 PR OFFICE/OUTPT VISIT, EST, LEVL IV, 30-39 MIN: ICD-10-PCS | Mod: S$GLB,,, | Performed by: INTERNAL MEDICINE

## 2021-05-03 PROCEDURE — 99214 OFFICE O/P EST MOD 30 MIN: CPT | Mod: S$GLB,,, | Performed by: INTERNAL MEDICINE

## 2021-05-03 PROCEDURE — 1126F AMNT PAIN NOTED NONE PRSNT: CPT | Mod: S$GLB,,, | Performed by: INTERNAL MEDICINE

## 2021-05-03 RX ORDER — GABAPENTIN 300 MG/1
CAPSULE ORAL
Qty: 120 CAPSULE | Refills: 3 | Status: SHIPPED | OUTPATIENT
Start: 2021-05-03 | End: 2023-05-23

## 2021-05-03 RX ORDER — DULAGLUTIDE 1.5 MG/.5ML
1.5 INJECTION, SOLUTION SUBCUTANEOUS
Qty: 4 PEN | Refills: 11 | Status: SHIPPED | OUTPATIENT
Start: 2021-05-03 | End: 2022-07-25

## 2021-05-03 RX ORDER — METFORMIN HYDROCHLORIDE 500 MG/1
TABLET, EXTENDED RELEASE ORAL
Qty: 180 TABLET | Refills: 3 | Status: SHIPPED | OUTPATIENT
Start: 2021-05-03 | End: 2022-07-24 | Stop reason: SDUPTHER

## 2021-05-06 ENCOUNTER — PATIENT OUTREACH (OUTPATIENT)
Dept: ADMINISTRATIVE | Facility: OTHER | Age: 51
End: 2021-05-06

## 2021-05-07 ENCOUNTER — OFFICE VISIT (OUTPATIENT)
Dept: ORTHOPEDICS | Facility: CLINIC | Age: 51
End: 2021-05-07
Payer: COMMERCIAL

## 2021-05-07 DIAGNOSIS — M76.822 POSTERIOR TIBIAL TENDINITIS, LEFT: Primary | ICD-10-CM

## 2021-05-07 PROCEDURE — 99999 PR PBB SHADOW E&M-EST. PATIENT-LVL I: ICD-10-PCS | Mod: PBBFAC,,, | Performed by: ORTHOPAEDIC SURGERY

## 2021-05-07 PROCEDURE — 99213 PR OFFICE/OUTPT VISIT, EST, LEVL III, 20-29 MIN: ICD-10-PCS | Mod: S$GLB,,, | Performed by: ORTHOPAEDIC SURGERY

## 2021-05-07 PROCEDURE — 99213 OFFICE O/P EST LOW 20 MIN: CPT | Mod: S$GLB,,, | Performed by: ORTHOPAEDIC SURGERY

## 2021-05-07 PROCEDURE — 99999 PR PBB SHADOW E&M-EST. PATIENT-LVL I: CPT | Mod: PBBFAC,,, | Performed by: ORTHOPAEDIC SURGERY

## 2021-06-11 ENCOUNTER — TELEPHONE (OUTPATIENT)
Dept: INTERNAL MEDICINE | Facility: CLINIC | Age: 51
End: 2021-06-11

## 2021-07-02 ENCOUNTER — TELEPHONE (OUTPATIENT)
Dept: INTERNAL MEDICINE | Facility: CLINIC | Age: 51
End: 2021-07-02

## 2021-07-02 DIAGNOSIS — G47.33 OBSTRUCTIVE SLEEP APNEA: Primary | ICD-10-CM

## 2021-08-13 ENCOUNTER — OFFICE VISIT (OUTPATIENT)
Dept: INTERNAL MEDICINE | Facility: CLINIC | Age: 51
End: 2021-08-13
Payer: COMMERCIAL

## 2021-08-13 VITALS
BODY MASS INDEX: 45 KG/M2 | SYSTOLIC BLOOD PRESSURE: 110 MMHG | HEART RATE: 79 BPM | HEIGHT: 66 IN | WEIGHT: 280 LBS | DIASTOLIC BLOOD PRESSURE: 80 MMHG | OXYGEN SATURATION: 99 %

## 2021-08-13 DIAGNOSIS — I10 ESSENTIAL HYPERTENSION: ICD-10-CM

## 2021-08-13 DIAGNOSIS — G47.33 OBSTRUCTIVE SLEEP APNEA SYNDROME, SEVERE: ICD-10-CM

## 2021-08-13 DIAGNOSIS — E11.9 TYPE 2 DIABETES MELLITUS WITHOUT COMPLICATION, WITHOUT LONG-TERM CURRENT USE OF INSULIN: Primary | ICD-10-CM

## 2021-08-13 DIAGNOSIS — E66.9 OBESITY, UNSPECIFIED CLASSIFICATION, UNSPECIFIED OBESITY TYPE, UNSPECIFIED WHETHER SERIOUS COMORBIDITY PRESENT: ICD-10-CM

## 2021-08-13 DIAGNOSIS — Z00.00 ROUTINE GENERAL MEDICAL EXAMINATION AT A HEALTH CARE FACILITY: ICD-10-CM

## 2021-08-13 DIAGNOSIS — E55.9 VITAMIN D DEFICIENCY: ICD-10-CM

## 2021-08-13 PROCEDURE — 99214 OFFICE O/P EST MOD 30 MIN: CPT | Mod: S$GLB,,, | Performed by: INTERNAL MEDICINE

## 2021-08-13 PROCEDURE — 99999 PR PBB SHADOW E&M-EST. PATIENT-LVL IV: CPT | Mod: PBBFAC,,, | Performed by: INTERNAL MEDICINE

## 2021-08-13 PROCEDURE — 3074F SYST BP LT 130 MM HG: CPT | Mod: CPTII,S$GLB,, | Performed by: INTERNAL MEDICINE

## 2021-08-13 PROCEDURE — 3008F PR BODY MASS INDEX (BMI) DOCUMENTED: ICD-10-PCS | Mod: CPTII,S$GLB,, | Performed by: INTERNAL MEDICINE

## 2021-08-13 PROCEDURE — 1126F PR PAIN SEVERITY QUANTIFIED, NO PAIN PRESENT: ICD-10-PCS | Mod: CPTII,S$GLB,, | Performed by: INTERNAL MEDICINE

## 2021-08-13 PROCEDURE — 1126F AMNT PAIN NOTED NONE PRSNT: CPT | Mod: CPTII,S$GLB,, | Performed by: INTERNAL MEDICINE

## 2021-08-13 PROCEDURE — 99214 PR OFFICE/OUTPT VISIT, EST, LEVL IV, 30-39 MIN: ICD-10-PCS | Mod: S$GLB,,, | Performed by: INTERNAL MEDICINE

## 2021-08-13 PROCEDURE — 3074F PR MOST RECENT SYSTOLIC BLOOD PRESSURE < 130 MM HG: ICD-10-PCS | Mod: CPTII,S$GLB,, | Performed by: INTERNAL MEDICINE

## 2021-08-13 PROCEDURE — 99999 PR PBB SHADOW E&M-EST. PATIENT-LVL IV: ICD-10-PCS | Mod: PBBFAC,,, | Performed by: INTERNAL MEDICINE

## 2021-08-13 PROCEDURE — 3044F PR MOST RECENT HEMOGLOBIN A1C LEVEL <7.0%: ICD-10-PCS | Mod: CPTII,S$GLB,, | Performed by: INTERNAL MEDICINE

## 2021-08-13 PROCEDURE — 3044F HG A1C LEVEL LT 7.0%: CPT | Mod: CPTII,S$GLB,, | Performed by: INTERNAL MEDICINE

## 2021-08-13 PROCEDURE — 3079F DIAST BP 80-89 MM HG: CPT | Mod: CPTII,S$GLB,, | Performed by: INTERNAL MEDICINE

## 2021-08-13 PROCEDURE — 3079F PR MOST RECENT DIASTOLIC BLOOD PRESSURE 80-89 MM HG: ICD-10-PCS | Mod: CPTII,S$GLB,, | Performed by: INTERNAL MEDICINE

## 2021-08-13 PROCEDURE — 1159F MED LIST DOCD IN RCRD: CPT | Mod: CPTII,S$GLB,, | Performed by: INTERNAL MEDICINE

## 2021-08-13 PROCEDURE — 3008F BODY MASS INDEX DOCD: CPT | Mod: CPTII,S$GLB,, | Performed by: INTERNAL MEDICINE

## 2021-08-13 PROCEDURE — 1159F PR MEDICATION LIST DOCUMENTED IN MEDICAL RECORD: ICD-10-PCS | Mod: CPTII,S$GLB,, | Performed by: INTERNAL MEDICINE

## 2021-08-13 RX ORDER — IBUPROFEN 800 MG/1
800 TABLET ORAL 3 TIMES DAILY PRN
Qty: 60 TABLET | Refills: 3 | Status: SHIPPED | OUTPATIENT
Start: 2021-08-13 | End: 2023-02-07

## 2021-08-19 ENCOUNTER — OFFICE VISIT (OUTPATIENT)
Dept: SLEEP MEDICINE | Facility: CLINIC | Age: 51
End: 2021-08-19
Payer: COMMERCIAL

## 2021-08-19 VITALS
HEIGHT: 66 IN | BODY MASS INDEX: 45 KG/M2 | WEIGHT: 280 LBS | SYSTOLIC BLOOD PRESSURE: 133 MMHG | DIASTOLIC BLOOD PRESSURE: 82 MMHG | HEART RATE: 79 BPM

## 2021-08-19 DIAGNOSIS — G47.33 OSA (OBSTRUCTIVE SLEEP APNEA): Primary | ICD-10-CM

## 2021-08-19 DIAGNOSIS — E66.01 MORBID OBESITY WITH BMI OF 45.0-49.9, ADULT: ICD-10-CM

## 2021-08-19 DIAGNOSIS — E55.9 VITAMIN D DEFICIENCY: ICD-10-CM

## 2021-08-19 DIAGNOSIS — G47.33 OBSTRUCTIVE SLEEP APNEA SYNDROME, SEVERE: ICD-10-CM

## 2021-08-19 DIAGNOSIS — K21.9 GASTROESOPHAGEAL REFLUX DISEASE WITHOUT ESOPHAGITIS: ICD-10-CM

## 2021-08-19 DIAGNOSIS — I10 ESSENTIAL HYPERTENSION, BENIGN: ICD-10-CM

## 2021-08-19 DIAGNOSIS — E11.9 NEW ONSET TYPE 2 DIABETES MELLITUS: ICD-10-CM

## 2021-08-19 PROCEDURE — 3075F PR MOST RECENT SYSTOLIC BLOOD PRESS GE 130-139MM HG: ICD-10-PCS | Mod: CPTII,S$GLB,, | Performed by: INTERNAL MEDICINE

## 2021-08-19 PROCEDURE — 1126F AMNT PAIN NOTED NONE PRSNT: CPT | Mod: CPTII,S$GLB,, | Performed by: INTERNAL MEDICINE

## 2021-08-19 PROCEDURE — 99999 PR PBB SHADOW E&M-EST. PATIENT-LVL V: CPT | Mod: PBBFAC,,, | Performed by: INTERNAL MEDICINE

## 2021-08-19 PROCEDURE — 3044F PR MOST RECENT HEMOGLOBIN A1C LEVEL <7.0%: ICD-10-PCS | Mod: CPTII,S$GLB,, | Performed by: INTERNAL MEDICINE

## 2021-08-19 PROCEDURE — 3075F SYST BP GE 130 - 139MM HG: CPT | Mod: CPTII,S$GLB,, | Performed by: INTERNAL MEDICINE

## 2021-08-19 PROCEDURE — 1126F PR PAIN SEVERITY QUANTIFIED, NO PAIN PRESENT: ICD-10-PCS | Mod: CPTII,S$GLB,, | Performed by: INTERNAL MEDICINE

## 2021-08-19 PROCEDURE — 3079F DIAST BP 80-89 MM HG: CPT | Mod: CPTII,S$GLB,, | Performed by: INTERNAL MEDICINE

## 2021-08-19 PROCEDURE — 99203 PR OFFICE/OUTPT VISIT, NEW, LEVL III, 30-44 MIN: ICD-10-PCS | Mod: S$GLB,,, | Performed by: INTERNAL MEDICINE

## 2021-08-19 PROCEDURE — 1159F PR MEDICATION LIST DOCUMENTED IN MEDICAL RECORD: ICD-10-PCS | Mod: CPTII,S$GLB,, | Performed by: INTERNAL MEDICINE

## 2021-08-19 PROCEDURE — 99203 OFFICE O/P NEW LOW 30 MIN: CPT | Mod: S$GLB,,, | Performed by: INTERNAL MEDICINE

## 2021-08-19 PROCEDURE — 1159F MED LIST DOCD IN RCRD: CPT | Mod: CPTII,S$GLB,, | Performed by: INTERNAL MEDICINE

## 2021-08-19 PROCEDURE — 3008F BODY MASS INDEX DOCD: CPT | Mod: CPTII,S$GLB,, | Performed by: INTERNAL MEDICINE

## 2021-08-19 PROCEDURE — 99999 PR PBB SHADOW E&M-EST. PATIENT-LVL V: ICD-10-PCS | Mod: PBBFAC,,, | Performed by: INTERNAL MEDICINE

## 2021-08-19 PROCEDURE — 3008F PR BODY MASS INDEX (BMI) DOCUMENTED: ICD-10-PCS | Mod: CPTII,S$GLB,, | Performed by: INTERNAL MEDICINE

## 2021-08-19 PROCEDURE — 3044F HG A1C LEVEL LT 7.0%: CPT | Mod: CPTII,S$GLB,, | Performed by: INTERNAL MEDICINE

## 2021-08-19 PROCEDURE — 1160F RVW MEDS BY RX/DR IN RCRD: CPT | Mod: CPTII,S$GLB,, | Performed by: INTERNAL MEDICINE

## 2021-08-19 PROCEDURE — 3079F PR MOST RECENT DIASTOLIC BLOOD PRESSURE 80-89 MM HG: ICD-10-PCS | Mod: CPTII,S$GLB,, | Performed by: INTERNAL MEDICINE

## 2021-08-19 PROCEDURE — 1160F PR REVIEW ALL MEDS BY PRESCRIBER/CLIN PHARMACIST DOCUMENTED: ICD-10-PCS | Mod: CPTII,S$GLB,, | Performed by: INTERNAL MEDICINE

## 2021-08-24 ENCOUNTER — TELEPHONE (OUTPATIENT)
Dept: SLEEP MEDICINE | Facility: OTHER | Age: 51
End: 2021-08-24

## 2021-09-07 ENCOUNTER — TELEPHONE (OUTPATIENT)
Dept: SLEEP MEDICINE | Facility: OTHER | Age: 51
End: 2021-09-07

## 2021-09-10 ENCOUNTER — TELEPHONE (OUTPATIENT)
Dept: SLEEP MEDICINE | Facility: OTHER | Age: 51
End: 2021-09-10

## 2021-09-23 ENCOUNTER — TELEPHONE (OUTPATIENT)
Dept: SLEEP MEDICINE | Facility: OTHER | Age: 51
End: 2021-09-23

## 2021-09-29 ENCOUNTER — TELEPHONE (OUTPATIENT)
Dept: SLEEP MEDICINE | Facility: OTHER | Age: 51
End: 2021-09-29

## 2021-10-04 ENCOUNTER — TELEPHONE (OUTPATIENT)
Dept: SLEEP MEDICINE | Facility: OTHER | Age: 51
End: 2021-10-04

## 2021-10-07 ENCOUNTER — TELEPHONE (OUTPATIENT)
Dept: SLEEP MEDICINE | Facility: OTHER | Age: 51
End: 2021-10-07

## 2021-10-08 ENCOUNTER — TELEPHONE (OUTPATIENT)
Dept: OPTOMETRY | Facility: CLINIC | Age: 51
End: 2021-10-08

## 2021-10-09 ENCOUNTER — IMMUNIZATION (OUTPATIENT)
Dept: INTERNAL MEDICINE | Facility: CLINIC | Age: 51
End: 2021-10-09
Payer: COMMERCIAL

## 2021-10-09 DIAGNOSIS — Z23 NEED FOR VACCINATION: Primary | ICD-10-CM

## 2021-10-09 PROCEDURE — 0003A COVID-19, MRNA, LNP-S, PF, 30 MCG/0.3 ML DOSE VACCINE: CPT | Mod: CV19,PBBFAC | Performed by: INTERNAL MEDICINE

## 2021-10-09 PROCEDURE — 91300 COVID-19, MRNA, LNP-S, PF, 30 MCG/0.3 ML DOSE VACCINE: CPT | Mod: PBBFAC | Performed by: INTERNAL MEDICINE

## 2021-10-11 ENCOUNTER — TELEPHONE (OUTPATIENT)
Dept: SLEEP MEDICINE | Facility: OTHER | Age: 51
End: 2021-10-11

## 2021-10-26 ENCOUNTER — TELEPHONE (OUTPATIENT)
Dept: SLEEP MEDICINE | Facility: OTHER | Age: 51
End: 2021-10-26
Payer: COMMERCIAL

## 2021-10-27 ENCOUNTER — HOSPITAL ENCOUNTER (OUTPATIENT)
Dept: SLEEP MEDICINE | Facility: OTHER | Age: 51
Discharge: HOME | End: 2021-10-27
Attending: INTERNAL MEDICINE
Payer: COMMERCIAL

## 2021-10-27 DIAGNOSIS — I10 ESSENTIAL HYPERTENSION, BENIGN: ICD-10-CM

## 2021-10-27 DIAGNOSIS — K21.9 GASTROESOPHAGEAL REFLUX DISEASE WITHOUT ESOPHAGITIS: ICD-10-CM

## 2021-10-27 DIAGNOSIS — E55.9 VITAMIN D DEFICIENCY: ICD-10-CM

## 2021-10-27 DIAGNOSIS — E11.9 NEW ONSET TYPE 2 DIABETES MELLITUS: ICD-10-CM

## 2021-10-27 DIAGNOSIS — E66.01 MORBID OBESITY WITH BMI OF 45.0-49.9, ADULT: ICD-10-CM

## 2021-10-27 DIAGNOSIS — G47.33 OSA (OBSTRUCTIVE SLEEP APNEA): ICD-10-CM

## 2021-10-27 PROCEDURE — 95800 SLP STDY UNATTENDED: CPT

## 2021-11-01 DIAGNOSIS — G47.33 OSA (OBSTRUCTIVE SLEEP APNEA): Primary | ICD-10-CM

## 2021-11-01 DIAGNOSIS — I10 ESSENTIAL HYPERTENSION, BENIGN: ICD-10-CM

## 2022-01-05 ENCOUNTER — PATIENT MESSAGE (OUTPATIENT)
Dept: ADMINISTRATIVE | Facility: OTHER | Age: 52
End: 2022-01-05
Payer: COMMERCIAL

## 2022-01-06 ENCOUNTER — OFFICE VISIT (OUTPATIENT)
Dept: OPTOMETRY | Facility: CLINIC | Age: 52
End: 2022-01-06
Payer: COMMERCIAL

## 2022-01-06 DIAGNOSIS — E11.9 TYPE 2 DIABETES MELLITUS WITHOUT RETINOPATHY: ICD-10-CM

## 2022-01-06 DIAGNOSIS — E11.9 TYPE 2 DIABETES MELLITUS WITHOUT COMPLICATION, WITHOUT LONG-TERM CURRENT USE OF INSULIN: ICD-10-CM

## 2022-01-06 DIAGNOSIS — H52.4 PRESBYOPIA OF BOTH EYES: ICD-10-CM

## 2022-01-06 DIAGNOSIS — H52.13 MYOPIA OF BOTH EYES: ICD-10-CM

## 2022-01-06 DIAGNOSIS — Z01.00 EXAMINATION OF EYES AND VISION: Primary | ICD-10-CM

## 2022-01-06 PROCEDURE — 92014 COMPRE OPH EXAM EST PT 1/>: CPT | Mod: S$GLB,,, | Performed by: OPTOMETRIST

## 2022-01-06 PROCEDURE — 99999 PR PBB SHADOW E&M-EST. PATIENT-LVL III: CPT | Mod: PBBFAC,,, | Performed by: OPTOMETRIST

## 2022-01-06 PROCEDURE — 2023F DILAT RTA XM W/O RTNOPTHY: CPT | Mod: CPTII,S$GLB,, | Performed by: OPTOMETRIST

## 2022-01-06 PROCEDURE — 92015 DETERMINE REFRACTIVE STATE: CPT | Mod: S$GLB,,, | Performed by: OPTOMETRIST

## 2022-01-06 PROCEDURE — 92014 PR EYE EXAM, EST PATIENT,COMPREHESV: ICD-10-PCS | Mod: S$GLB,,, | Performed by: OPTOMETRIST

## 2022-01-06 PROCEDURE — 1159F MED LIST DOCD IN RCRD: CPT | Mod: CPTII,S$GLB,, | Performed by: OPTOMETRIST

## 2022-01-06 PROCEDURE — 92015 PR REFRACTION: ICD-10-PCS | Mod: S$GLB,,, | Performed by: OPTOMETRIST

## 2022-01-06 PROCEDURE — 2023F PR DILATED RETINAL EXAM W/O EVID OF RETINOPATHY: ICD-10-PCS | Mod: CPTII,S$GLB,, | Performed by: OPTOMETRIST

## 2022-01-06 PROCEDURE — 99999 PR PBB SHADOW E&M-EST. PATIENT-LVL III: ICD-10-PCS | Mod: PBBFAC,,, | Performed by: OPTOMETRIST

## 2022-01-06 PROCEDURE — 1159F PR MEDICATION LIST DOCUMENTED IN MEDICAL RECORD: ICD-10-PCS | Mod: CPTII,S$GLB,, | Performed by: OPTOMETRIST

## 2022-01-06 NOTE — PATIENT INSTRUCTIONS
Good ocular health in both eyes.  Type 2 diabetes without evidence of diabetic retinopathy in either eye.  Slight myopia (nearsightedness) in each eye, with good best-corrected VA in each eye  Presbyopia consistent with age.     Spectacle lens Rx issued for use as desired.  Okay to use +2.00 or +2.25 over-the-counter reading glasses, if happy with near vision with those glasses and if happy with unaided distance vision     Recheck in 12 months - or prior, if any problems or bothersome changes in vision noted in the interim

## 2022-01-06 NOTE — PROGRESS NOTES
"HPI     diabetic eye exam      Additional comments: Diabetic eye examination and refraction.  Uses OTC reading glasses.  Feels near VA with glasses not as good as she would like or as good as it   once was.  Happy with unaided distance VA.               Comments     Patient's age: 51 y.o. AA female   Occupation: Security: [employer]  Approximate date of last eye examination:  12/26/2018  Name of last eye doctor seen: Dr. Wolf  Wears glasses? OTC reading glasses     If yes, wears  Full-time or part-time?  Part-time, for near  Present glasses are: Bifocal, SV Distance, SV Reading?  SV OTC reading   glasses  Approximate age of present glasses:  n/a   Got new glasses following last exam, or subsequently?:  no    Any problem with VA with glasses?  See notes above  Wears CLs?:  no  Headaches?  no  Eye pain/discomfort?  no                                                                                     Flashes?  no  Floaters?  no  Diplopia/Double vision?  no  Patient's Ocular History:         Any eye surgeries? no         Any eye injury?  no         Any treatment for eye disease?  no  Family history of eye disease?  None known  Significant patient medical history:         1. Diabetes?  Yes x 5 years +/-       If yes, IDDM or NIDDM? Type 2 -taking Metformin   2. HBP?  Yes -takes meds - states well controlled              3. Other (describe):  none reported   ! OTC eyedrops currently using:  no   ! Prescription eye meds currently using:  no   ! Any history of allergy/adverse reaction to any eye meds used   previously?  no    ! Any history of allergy/adverse reaction to eyedrops used during prior   eye exam(s)? no       ! Patient okay with use of anesthetic eyedrops to check eye pressure?    yes         ! Patient okay with use of eyedrops to dilate pupils today?  yes   !  Allergies/Medications/Medical History/Family History reviewed today?    yes      PD =   68/64  Desired reading distance =  13"                "                                                          Last edited by Rogers Sams, OD on 1/6/2022  7:58 AM. (History)        ROS     Negative for: Psychiatric    Last edited by Rogers Sams, OD on 1/6/2022  8:54 AM. (History)        Assessment /Plan     For exam results, see Encounter Report.    1. Examination of eyes and vision     2. Type 2 diabetes mellitus without complication, without long-term current use of insulin  Ambulatory referral/consult to Optometry   3. Type 2 diabetes mellitus without retinopathy     4. Myopia of both eyes     5. Presbyopia of both eyes                      Good ocular health in both eyes.  Type 2 diabetes without evidence of diabetic retinopathy in either eye.  Slight myopia (nearsightedness) in each eye, with good best-corrected VA in each eye  Presbyopia consistent with age.     Spectacle lens Rx issued for use as desired.  Okay to use +2.00 or +2.25 over-the-counter reading glasses, if happy with near vision with those glasses and if happy with unaided distance vision     Recheck in 12 months - or prior, if any problems or bothersome changes in vision noted in the interim

## 2022-01-23 ENCOUNTER — PATIENT MESSAGE (OUTPATIENT)
Dept: OTHER | Facility: OTHER | Age: 52
End: 2022-01-23
Payer: COMMERCIAL

## 2022-02-11 ENCOUNTER — OFFICE VISIT (OUTPATIENT)
Dept: INTERNAL MEDICINE | Facility: CLINIC | Age: 52
End: 2022-02-11
Payer: COMMERCIAL

## 2022-02-11 VITALS
DIASTOLIC BLOOD PRESSURE: 80 MMHG | BODY MASS INDEX: 42.68 KG/M2 | SYSTOLIC BLOOD PRESSURE: 120 MMHG | OXYGEN SATURATION: 99 % | HEART RATE: 75 BPM | WEIGHT: 271.94 LBS | HEIGHT: 67 IN

## 2022-02-11 DIAGNOSIS — E55.9 VITAMIN D DEFICIENCY: ICD-10-CM

## 2022-02-11 DIAGNOSIS — E11.9 TYPE 2 DIABETES MELLITUS WITHOUT COMPLICATION, WITHOUT LONG-TERM CURRENT USE OF INSULIN: ICD-10-CM

## 2022-02-11 DIAGNOSIS — G47.33 OBSTRUCTIVE SLEEP APNEA SYNDROME, SEVERE: ICD-10-CM

## 2022-02-11 DIAGNOSIS — I10 ESSENTIAL HYPERTENSION, BENIGN: ICD-10-CM

## 2022-02-11 DIAGNOSIS — M72.2 PLANTAR FASCIITIS: ICD-10-CM

## 2022-02-11 DIAGNOSIS — I10 ESSENTIAL HYPERTENSION: ICD-10-CM

## 2022-02-11 DIAGNOSIS — Z12.31 SCREENING MAMMOGRAM FOR BREAST CANCER: Primary | ICD-10-CM

## 2022-02-11 PROCEDURE — 3008F BODY MASS INDEX DOCD: CPT | Mod: CPTII,S$GLB,, | Performed by: INTERNAL MEDICINE

## 2022-02-11 PROCEDURE — 3044F HG A1C LEVEL LT 7.0%: CPT | Mod: CPTII,S$GLB,, | Performed by: INTERNAL MEDICINE

## 2022-02-11 PROCEDURE — 99214 OFFICE O/P EST MOD 30 MIN: CPT | Mod: S$GLB,,, | Performed by: INTERNAL MEDICINE

## 2022-02-11 PROCEDURE — 3079F DIAST BP 80-89 MM HG: CPT | Mod: CPTII,S$GLB,, | Performed by: INTERNAL MEDICINE

## 2022-02-11 PROCEDURE — 3044F PR MOST RECENT HEMOGLOBIN A1C LEVEL <7.0%: ICD-10-PCS | Mod: CPTII,S$GLB,, | Performed by: INTERNAL MEDICINE

## 2022-02-11 PROCEDURE — 3008F PR BODY MASS INDEX (BMI) DOCUMENTED: ICD-10-PCS | Mod: CPTII,S$GLB,, | Performed by: INTERNAL MEDICINE

## 2022-02-11 PROCEDURE — 3074F SYST BP LT 130 MM HG: CPT | Mod: CPTII,S$GLB,, | Performed by: INTERNAL MEDICINE

## 2022-02-11 PROCEDURE — 3079F PR MOST RECENT DIASTOLIC BLOOD PRESSURE 80-89 MM HG: ICD-10-PCS | Mod: CPTII,S$GLB,, | Performed by: INTERNAL MEDICINE

## 2022-02-11 PROCEDURE — 99999 PR PBB SHADOW E&M-EST. PATIENT-LVL III: CPT | Mod: PBBFAC,,, | Performed by: INTERNAL MEDICINE

## 2022-02-11 PROCEDURE — 99214 PR OFFICE/OUTPT VISIT, EST, LEVL IV, 30-39 MIN: ICD-10-PCS | Mod: S$GLB,,, | Performed by: INTERNAL MEDICINE

## 2022-02-11 PROCEDURE — 99999 PR PBB SHADOW E&M-EST. PATIENT-LVL III: ICD-10-PCS | Mod: PBBFAC,,, | Performed by: INTERNAL MEDICINE

## 2022-02-11 PROCEDURE — 3074F PR MOST RECENT SYSTOLIC BLOOD PRESSURE < 130 MM HG: ICD-10-PCS | Mod: CPTII,S$GLB,, | Performed by: INTERNAL MEDICINE

## 2022-02-11 RX ORDER — PANTOPRAZOLE SODIUM 40 MG/1
40 TABLET, DELAYED RELEASE ORAL DAILY
Qty: 90 TABLET | Refills: 3 | Status: SHIPPED | OUTPATIENT
Start: 2022-02-11 | End: 2023-02-07 | Stop reason: SDUPTHER

## 2022-02-11 RX ORDER — ERGOCALCIFEROL 1.25 MG/1
50000 CAPSULE ORAL WEEKLY
Qty: 12 CAPSULE | Refills: 4 | Status: SHIPPED | OUTPATIENT
Start: 2022-02-11 | End: 2023-02-07 | Stop reason: SDUPTHER

## 2022-02-11 RX ORDER — TRIAMTERENE/HYDROCHLOROTHIAZID 37.5-25 MG
1 TABLET ORAL DAILY
Qty: 90 TABLET | Refills: 4 | Status: SHIPPED | OUTPATIENT
Start: 2022-02-11 | End: 2023-02-07 | Stop reason: SDUPTHER

## 2022-02-11 NOTE — PROGRESS NOTES
"  Chief Complaint: Follow up of multiple issues.      HPI:THis is a 51 year old woman who presnets for follow up of multiple issues    She is exercising at [employer] 2 times a week when she is off - She does security at [employer]. She has lost 8 pounds since our last visit.     She had chest pain and abdominal pain for 2 weeks. She had a pulling in her chest.  She went to the ED on 2/3/2022 - GI cocktail helped her pain.  She is taking over the coutner gaviscon every other day or tums which does help her symptoms. She denies chest pain now. She gets abdominal pain before or after meals. She gets full fast.  She has nausea at times. No vomiting, constipation, diarrhea, melana, bloody stools.    She took Trulicity for 2 months.  She did not have a problem with the Trulicity. She stopped the Trulicity over fear of a problem with the medication. She continues to take metformin  mg 2 tablets daily.   No nausea, vomiting, conttipation, diarrhea.       Her blood pressure is controlled on triamterene hct 37.5/25 one tablet daily.  NO chest pain, shortness of breath, paplitations.        She takes gabapentin 300 mg one capsule and  ibuprofen 800 mg as needed for foot or leg pain - once a week. Has not taken lately     She saw Dr Triplett in orthoepdics for her foot pain. She had a MRI on 3/4/21."The MRI of the left hindfoot reveals significant tenosynovitis with fluid around the posterior tibial tendon and some mild tendinosis near the insertion of the posterior tibial tendon".  She was put in a boot and has weaned out of the boot.  Dr Triplett stopped her from working from March 1 to May 10, 2021. Foot is better. She still has good and bad days.  She is regular tennis shoes now.  The right foot is much better. She saw Dr Triplett on 5/7/21. She he has not needed to see him since. She completed physical therapy at [facility]. She continues to do her exercises at home.      No dizzines. She takes " loratadine 10 mg daily as needed which helps. No sinus congestion currently - comes and goes      She gets fever blisters once a year and valtrex 1000 2 tablets twice daily for only one day which works.  No fever blisters     She has sleep apnea. HEr sleep study 8/1/16 she has severe obstructive sleep apnea. She has a CPAP machine - she has been wearing the CPAP more than usual (2-3 nights a week) - wears 6-7 hours at night.                     Past Medical History:   Diagnosis Date    Diabetes mellitus      Hypertension      Obstructive sleep apnea syndrome, severe                        Past Surgical History:   Procedure Laterality Date    COLONOSCOPY N/A 8/22/2018     Procedure: COLONOSCOPY;  Surgeon: Bianca Cooney MD;  Location: [facility];  Service: Endoscopy;  Laterality: N/A;    ENDOMETRIAL ABLATION        ESOPHAGOGASTRODUODENOSCOPY N/A 6/27/2018     Procedure: ESOPHAGOGASTRODUODENOSCOPY (EGD);  Surgeon: Bianca Cooney MD;  Location: [facility];  Service: Endoscopy;  Laterality: N/A;    TOTAL ABDOMINAL HYSTERECTOMY W/ BILATERAL SALPINGOOPHORECTOMY   11/12/2015      Social History                   Socioeconomic History    Marital status:        Spouse name: Not on file    Number of children: Not on file    Years of education: Not on file    Highest education level: Not on file   Occupational History    Occupation: security   Social Needs    Financial resource strain: Not hard at all    Food insecurity       Worry: Never true       Inability: Never true    Transportation needs       Medical: No       Non-medical: No   Tobacco Use    Smoking status: Never Smoker    Smokeless tobacco: Never Used   Substance and Sexual Activity    Alcohol use: Yes       Alcohol/week: 1.0 standard drinks       Types: 1 Cans of beer per week       Frequency: Monthly or less       Drinks per session: 1 or 2       Binge frequency: Never       Comment: 1-2 drinks a week    Drug use: No    Sexual  "activity: Never       Birth control/protection: None   Lifestyle    Physical activity       Days per week: Not on file       Minutes per session: Not on file    Stress: Not on file   Relationships    Social connections       Talks on phone: Once a week       Gets together: Never       Attends Faith service: Never       Active member of club or organization: No       Attends meetings of clubs or organizations: Never       Relationship status: Living with partner   Other Topics Concern    Are you pregnant or think you may be? Not Asked    Breast-feeding Not Asked   Social History Narrative    Not on file                   Family Status   Relation Name Status    Father       Mother   Alive    MGM       MGF       PGM       PGF       Brother   Alive    Neg Hx   (Not Specified)               Meds and allergies: updated on Epic         Physical exam:     /80   Pulse 75   Ht 5' 7" (1.702 m)   Wt 123.4 kg (271 lb 15 oz)   LMP 10/25/2015   SpO2 99%   BMI 42.59 kg/m²          General: alert, oriented x 3, no apparent distress.  Affect normal  HEENT: Conjunctivae: anicteric, PERRL, EOMI, TM clear fluid on left, normal on right, Oralpharynx clear  Neck: supple, no thyroid enlargement, no cervical lymphadenopathy  Resp: effort normal, lungs clear bilaterally  CV: Regular rate and rhythm without murmurs, gallops or rubs, no lower extremity edema,   ABDOMEN: soft, non distended, non tender, bowel sounds present, no hepatosplenomgaly  BREAST: no abn seen, no nodules palpated, no axillary lad    Protective Sensation (w/ 10 gram monofilament):  Right: Intact  Left: Intact    Visual Inspection:  Dry Skin -  Left    Pedal Pulses:   Right: Present  Left: Present    Posterior tibialis:   Right:Present  Left: Present               Labs 22 and 2/3/22 reviewed           Assessment/Plan:      gerd - Pantoprazole 40 mg once daily   Left Foot pain and ankle pain " -improved. Mentholaum on the dry skin of the left foot.  HTN - controlled  Diabetes -better  Vitamin D deficiency -vitamin D 50,000 units once a week  Colonoscopy due 2018 - normal - due in 5 years -  - father  of colon cancer at age 74.  EGD normal 18  MMG 2021 - scheduled     Follow up in 4 months, [place] if issues

## 2022-03-11 ENCOUNTER — HOSPITAL ENCOUNTER (OUTPATIENT)
Dept: RADIOLOGY | Facility: HOSPITAL | Age: 52
Discharge: HOME | End: 2022-03-11
Attending: INTERNAL MEDICINE
Payer: COMMERCIAL

## 2022-03-11 VITALS — BODY MASS INDEX: 42.53 KG/M2 | HEIGHT: 67 IN | WEIGHT: 271 LBS

## 2022-03-11 DIAGNOSIS — Z12.31 SCREENING MAMMOGRAM FOR BREAST CANCER: ICD-10-CM

## 2022-03-11 PROCEDURE — 77067 MAMMO DIGITAL SCREENING BILAT WITH TOMO: ICD-10-PCS | Mod: 26,,, | Performed by: RADIOLOGY

## 2022-03-11 PROCEDURE — 77067 SCR MAMMO BI INCL CAD: CPT | Mod: 26,,, | Performed by: RADIOLOGY

## 2022-03-11 PROCEDURE — 77063 MAMMO DIGITAL SCREENING BILAT WITH TOMO: ICD-10-PCS | Mod: 26,,, | Performed by: RADIOLOGY

## 2022-03-11 PROCEDURE — 77063 BREAST TOMOSYNTHESIS BI: CPT | Mod: 26,,, | Performed by: RADIOLOGY

## 2022-03-11 PROCEDURE — 77063 BREAST TOMOSYNTHESIS BI: CPT | Mod: TC

## 2022-04-18 ENCOUNTER — PATIENT MESSAGE (OUTPATIENT)
Dept: OTHER | Facility: OTHER | Age: 52
End: 2022-04-18
Payer: COMMERCIAL

## 2022-05-12 ENCOUNTER — PATIENT MESSAGE (OUTPATIENT)
Dept: OTHER | Facility: OTHER | Age: 52
End: 2022-05-12
Payer: COMMERCIAL

## 2022-05-12 ENCOUNTER — PATIENT MESSAGE (OUTPATIENT)
Dept: ADMINISTRATIVE | Facility: OTHER | Age: 52
End: 2022-05-12
Payer: COMMERCIAL

## 2022-06-08 ENCOUNTER — PATIENT MESSAGE (OUTPATIENT)
Dept: INTERNAL MEDICINE | Facility: CLINIC | Age: 52
End: 2022-06-08
Payer: COMMERCIAL

## 2022-06-08 RX ORDER — FAMOTIDINE 40 MG/1
40 TABLET, FILM COATED ORAL DAILY
Qty: 90 TABLET | Refills: 4 | Status: SHIPPED | OUTPATIENT
Start: 2022-06-08 | End: 2023-11-25 | Stop reason: SDUPTHER

## 2022-06-09 ENCOUNTER — PATIENT MESSAGE (OUTPATIENT)
Dept: INTERNAL MEDICINE | Facility: CLINIC | Age: 52
End: 2022-06-09
Payer: COMMERCIAL

## 2022-06-17 ENCOUNTER — IMMUNIZATION (OUTPATIENT)
Dept: PRIMARY CARE CLINIC | Facility: CLINIC | Age: 52
End: 2022-06-17
Payer: COMMERCIAL

## 2022-06-17 DIAGNOSIS — Z23 NEED FOR VACCINATION: Primary | ICD-10-CM

## 2022-06-17 PROCEDURE — 91305 COVID-19, MRNA, LNP-S, PF, 30 MCG/0.3 ML DOSE VACCINE (PFIZER): CPT | Mod: PBBFAC | Performed by: FAMILY MEDICINE

## 2022-07-24 NOTE — TELEPHONE ENCOUNTER
Care Due:                  Date            Visit Type   Department     Provider  --------------------------------------------------------------------------------                                EP -                              PRIMARY      [facility] INTERNAL  Last Visit: 02-      CARE ([facility])   MEDICINE       SHIRA ABDUL                              [facility]                              PRIMARY      [facility] INTERNAL  Next Visit: 08-      CARE ([facility])   MEDICINE       SHIRA ABDUL                                                            Last  Test          Frequency    Reason                     Performed    Due Date  --------------------------------------------------------------------------------    HBA1C.......  6 months...  dulaglutide, metFORMIN...  02- 08-    [facility] Embedded Care Gaps. Reference number: [id number]. 7/24/2022   2:41:40 PM CDT

## 2022-07-25 RX ORDER — METFORMIN HYDROCHLORIDE 500 MG/1
1000 TABLET, EXTENDED RELEASE ORAL
Qty: 180 TABLET | Refills: 0 | Status: SHIPPED | OUTPATIENT
Start: 2022-07-25 | End: 2022-08-08 | Stop reason: SDUPTHER

## 2022-07-26 NOTE — TELEPHONE ENCOUNTER
Refill Decision Note   Dina Martínez  is requesting a refill authorization.  Brief Assessment and Rationale for Refill:  Approve     Medication Therapy Plan:  FLOS 08/02/22    Medication Reconciliation Completed: No   Comments:     No Care Gaps recommended.     Note composed:8:38 PM 07/25/2022

## 2022-08-08 ENCOUNTER — OFFICE VISIT (OUTPATIENT)
Dept: INTERNAL MEDICINE | Facility: CLINIC | Age: 52
End: 2022-08-08
Payer: COMMERCIAL

## 2022-08-08 VITALS
OXYGEN SATURATION: 99 % | BODY MASS INDEX: 42.77 KG/M2 | DIASTOLIC BLOOD PRESSURE: 80 MMHG | HEART RATE: 91 BPM | HEIGHT: 67 IN | WEIGHT: 272.5 LBS | SYSTOLIC BLOOD PRESSURE: 120 MMHG

## 2022-08-08 DIAGNOSIS — Z00.00 ROUTINE GENERAL MEDICAL EXAMINATION AT A HEALTH CARE FACILITY: Primary | ICD-10-CM

## 2022-08-08 DIAGNOSIS — E11.9 TYPE 2 DIABETES MELLITUS WITHOUT COMPLICATION, WITHOUT LONG-TERM CURRENT USE OF INSULIN: ICD-10-CM

## 2022-08-08 DIAGNOSIS — E55.9 VITAMIN D DEFICIENCY DISEASE: ICD-10-CM

## 2022-08-08 DIAGNOSIS — E53.8 VITAMIN B12 DEFICIENCY: ICD-10-CM

## 2022-08-08 DIAGNOSIS — R39.15 URINARY URGENCY: ICD-10-CM

## 2022-08-08 LAB
BILIRUB UR QL STRIP: NEGATIVE
CLARITY UR REFRACT.AUTO: CLEAR
COLOR UR AUTO: YELLOW
GLUCOSE UR QL STRIP: NEGATIVE
HGB UR QL STRIP: NEGATIVE
KETONES UR QL STRIP: NEGATIVE
LEUKOCYTE ESTERASE UR QL STRIP: NEGATIVE
NITRITE UR QL STRIP: NEGATIVE
PH UR STRIP: 7 [PH] (ref 5–8)
PROT UR QL STRIP: NEGATIVE
SP GR UR STRIP: 1.01 (ref 1–1.03)
URN SPEC COLLECT METH UR: NORMAL

## 2022-08-08 PROCEDURE — 1159F PR MEDICATION LIST DOCUMENTED IN MEDICAL RECORD: ICD-10-PCS | Mod: CPTII,S$GLB,, | Performed by: INTERNAL MEDICINE

## 2022-08-08 PROCEDURE — 3044F PR MOST RECENT HEMOGLOBIN A1C LEVEL <7.0%: ICD-10-PCS | Mod: CPTII,S$GLB,, | Performed by: INTERNAL MEDICINE

## 2022-08-08 PROCEDURE — 99999 PR PBB SHADOW E&M-EST. PATIENT-LVL IV: CPT | Mod: PBBFAC,,, | Performed by: INTERNAL MEDICINE

## 2022-08-08 PROCEDURE — 99999 PR PBB SHADOW E&M-EST. PATIENT-LVL IV: ICD-10-PCS | Mod: PBBFAC,,, | Performed by: INTERNAL MEDICINE

## 2022-08-08 PROCEDURE — 99396 PREV VISIT EST AGE 40-64: CPT | Mod: S$GLB,,, | Performed by: INTERNAL MEDICINE

## 2022-08-08 PROCEDURE — 87086 URINE CULTURE/COLONY COUNT: CPT | Performed by: INTERNAL MEDICINE

## 2022-08-08 PROCEDURE — 3008F PR BODY MASS INDEX (BMI) DOCUMENTED: ICD-10-PCS | Mod: CPTII,S$GLB,, | Performed by: INTERNAL MEDICINE

## 2022-08-08 PROCEDURE — 3074F PR MOST RECENT SYSTOLIC BLOOD PRESSURE < 130 MM HG: ICD-10-PCS | Mod: CPTII,S$GLB,, | Performed by: INTERNAL MEDICINE

## 2022-08-08 PROCEDURE — 3008F BODY MASS INDEX DOCD: CPT | Mod: CPTII,S$GLB,, | Performed by: INTERNAL MEDICINE

## 2022-08-08 PROCEDURE — 3074F SYST BP LT 130 MM HG: CPT | Mod: CPTII,S$GLB,, | Performed by: INTERNAL MEDICINE

## 2022-08-08 PROCEDURE — 3044F HG A1C LEVEL LT 7.0%: CPT | Mod: CPTII,S$GLB,, | Performed by: INTERNAL MEDICINE

## 2022-08-08 PROCEDURE — 81003 URINALYSIS AUTO W/O SCOPE: CPT | Performed by: INTERNAL MEDICINE

## 2022-08-08 PROCEDURE — 3079F DIAST BP 80-89 MM HG: CPT | Mod: CPTII,S$GLB,, | Performed by: INTERNAL MEDICINE

## 2022-08-08 PROCEDURE — 99396 PR PREVENTIVE VISIT,EST,40-64: ICD-10-PCS | Mod: S$GLB,,, | Performed by: INTERNAL MEDICINE

## 2022-08-08 PROCEDURE — 1159F MED LIST DOCD IN RCRD: CPT | Mod: CPTII,S$GLB,, | Performed by: INTERNAL MEDICINE

## 2022-08-08 PROCEDURE — 3079F PR MOST RECENT DIASTOLIC BLOOD PRESSURE 80-89 MM HG: ICD-10-PCS | Mod: CPTII,S$GLB,, | Performed by: INTERNAL MEDICINE

## 2022-08-08 RX ORDER — ESCITALOPRAM OXALATE 5 MG/1
5 TABLET ORAL DAILY
Qty: 30 TABLET | Refills: 4 | Status: SHIPPED | OUTPATIENT
Start: 2022-08-08 | End: 2023-02-07

## 2022-08-08 RX ORDER — METFORMIN HYDROCHLORIDE 500 MG/1
1000 TABLET, EXTENDED RELEASE ORAL
Qty: 180 TABLET | Refills: 4 | Status: SHIPPED | OUTPATIENT
Start: 2022-08-08 | End: 2023-02-07 | Stop reason: SDUPTHER

## 2022-08-08 NOTE — PROGRESS NOTES
"Chief Complaint: Annual exam      HPI:THis is a 51 year old woman who presnets for [facility] annual exam    For the last several months, she has had urinary urgency.  She has had some urge incontinence when trying to get to the  Bathroom. She waits until she has to ur inate to go to the bathroom    Her stomach has been doing well wiht pantoprazole 40 mg in am and famotidine 40 mg in the evening.      She continues to have left foot pain.  She is not taking gabapentin routinely   She takes ibuprofen 800 mg 1-2 times a month. No stomach upset from the ibuprofen. She does her exercises at home. She saw Dr Triplett in orthoepdics for her foot pain. She had a MRI on 3/4/21."The MRI of the left hindfoot reveals significant tenosynovitis with fluid around the posterior tibial tendon and some mild tendinosis near the insertion of the posterior tibial tendon".  She was put in a boot and has weaned out of the boot.  Dr Triplett stopped her from working from March 1 to May 10, 2021. Foot is better. She still has good and bad days.  She is regular tennis shoes now.      No dizzines. She takes loratadine 10 mg daily as needed which helps. No sinus congestion currently - comes and goes       She gets fever blisters once a year and valtrex 1000 2 tablets twice daily for only one day which works.  No fever blisters     She has sleep apnea. HEr sleep study 8/1/16 she has severe obstructive sleep apnea. She has a CPAP machine - she has been wearing the CPAP more than usual (3 nights a week) - wears 6-7 hours at night.     She is off Trulicity - gave her stomach problems which improved off trulicity.        She continues metformin  mg 2 tablets daily..  Blood sugars are doing well.  No nausea, vomiting, conttipation, diarrhea.         Her blood pressure is controlled on triamterene hct 37.5/25 one tablet daily.  NO chest pain, shortness of breath, paplitations.     She is taking vitamin D 50,000 units twice a week    Mood has been " sad at times. She is agitated and impatient at times.  She is nervous at times. NO homicidal or suicidal ideations.  Life is ok. Work is ok.                Past Medical History:   Diagnosis Date    Diabetes mellitus      Hypertension      Obstructive sleep apnea syndrome, severe                        Past Surgical History:   Procedure Laterality Date    COLONOSCOPY N/A 8/22/2018     Procedure: COLONOSCOPY;  Surgeon: Bianca Cooney MD;  Location: [facility];  Service: Endoscopy;  Laterality: N/A;    ENDOMETRIAL ABLATION        ESOPHAGOGASTRODUODENOSCOPY N/A 6/27/2018     Procedure: ESOPHAGOGASTRODUODENOSCOPY (EGD);  Surgeon: Bianca Cooney MD;  Location: [facility];  Service: Endoscopy;  Laterality: N/A;    TOTAL ABDOMINAL HYSTERECTOMY W/ BILATERAL SALPINGOOPHORECTOMY   11/12/2015      Social History                   Socioeconomic History    Marital status:        Spouse name: Not on file    Number of children: Not on file    Years of education: Not on file    Highest education level: Not on file   Occupational History    Occupation: security   Social Needs    Financial resource strain: Not hard at all    Food insecurity       Worry: Never true       Inability: Never true    Transportation needs       Medical: No       Non-medical: No   Tobacco Use    Smoking status: Never Smoker    Smokeless tobacco: Never Used   Substance and Sexual Activity    Alcohol use: Yes       Alcohol/week: 1.0 standard drinks       Types: 1 Cans of beer per week       Frequency: Monthly or less       Drinks per session: 1 or 2       Binge frequency: Never       Comment: 1-2 drinks a week    Drug use: No    Sexual activity: Never       Birth control/protection: None   Lifestyle    Physical activity       Days per week: Not on file       Minutes per session: Not on file    Stress: Not on file   Relationships    Social connections       Talks on phone: Once a week       Gets together: Never       Attends  "Baptism service: Never       Active member of club or organization: No       Attends meetings of clubs or organizations: Never       Relationship status: Living with partner   Other Topics Concern    Are you pregnant or think you may be? Not Asked    Breast-feeding Not Asked   Social History Narrative    Not on file                   Family Status   Relation Name Status    Father       Mother   Alive    MGM       MGF       PGM       PGF       Brother   Alive    Neg Hx   (Not Specified)               Meds and allergies: updated on Epic     REVIEW OF SYSTEMS: No fevers, chills, night sweats, fatigue, visual change, hearing loss, sinus congestion, sore throat, chest pain, shortness of breath, nausea, vomiting, constipation, diarrhea, dysuria, hematuria, polydipsia, polyuria, other joint pain, muscle pain, headaches, anxiety, depression, insomnia.            Physical exam:      /80 (BP Location: Right arm, Patient Position: Sitting)   Pulse 91   Ht 5' 7" (1.702 m)   Wt 123.6 kg (272 lb 7.8 oz)   LMP 10/25/2015   SpO2 99%   BMI 42.68 kg/m²        General: alert, oriented x 3, no apparent distress.  Affect normal  HEENT: Conjunctivae: anicteric, PERRL, EOMI, TM clear fluid on left, normal on right, Oralpharynx clear  Neck: supple, no thyroid enlargement, no cervical lymphadenopathy  Resp: effort normal, lungs clear bilaterally  CV: Regular rate and rhythm without murmurs, gallops or rubs, no lower extremity edema,            Labs 22 reviewed           Assessment/Plan:    Annual exam - discussed diet, exercise and safety issues.          Left Foot pain and ankle pain -improved  HTN - controlled  Diabetes -better  Vitamin D deficiency - on replacement  Mood issues - lexapro 5 mg 1/2 tablet daily for 8 days then one tablet daily.   Colonoscopy due 2018 - normal - due in 5 years -  - father  of colon cancer at age 74.  EGD normal " 6/27/18  MMG 3/22     Follow up in 6 months, [place] if issues

## 2022-08-09 ENCOUNTER — PATIENT MESSAGE (OUTPATIENT)
Dept: INTERNAL MEDICINE | Facility: CLINIC | Age: 52
End: 2022-08-09
Payer: COMMERCIAL

## 2022-08-09 LAB — BACTERIA UR CULT: NO GROWTH

## 2022-09-17 DIAGNOSIS — B00.1 HERPES LABIALIS: ICD-10-CM

## 2022-09-17 NOTE — TELEPHONE ENCOUNTER
No new care gaps identified.  [facility] Embedded Care Gaps. Reference number: [id number]. 9/17/2022   4:20:20 PM CDT

## 2022-09-17 NOTE — TELEPHONE ENCOUNTER
Refill Routing Note   Medication(s) are not appropriate for processing by [facility] for the following reason(s):      - Medication not active on medication list    [facility] action(s):  Defer          Medication reconciliation completed: No     Appointments  past 12m or future 3m with PCP    Date Provider   Last Visit   8/8/2022 Pamela Arnold MD   Next Visit   2/7/2023 Pamela Arnold MD   ED visits in past 90 days: 0        Note composed:4:47 PM 09/17/2022

## 2022-09-18 RX ORDER — VALACYCLOVIR HYDROCHLORIDE 1 G/1
500 TABLET, FILM COATED ORAL
Qty: 30 TABLET | Refills: 0 | Status: SHIPPED | OUTPATIENT
Start: 2022-09-18

## 2022-10-04 ENCOUNTER — OFFICE VISIT (OUTPATIENT)
Dept: URGENT CARE | Facility: CLINIC | Age: 52
End: 2022-10-04
Payer: COMMERCIAL

## 2022-10-04 VITALS
DIASTOLIC BLOOD PRESSURE: 86 MMHG | HEART RATE: 89 BPM | OXYGEN SATURATION: 97 % | TEMPERATURE: 97 F | RESPIRATION RATE: 18 BRPM | WEIGHT: 272.5 LBS | SYSTOLIC BLOOD PRESSURE: 144 MMHG | HEIGHT: 67 IN | BODY MASS INDEX: 42.77 KG/M2

## 2022-10-04 DIAGNOSIS — U07.1 COVID-19: Primary | ICD-10-CM

## 2022-10-04 DIAGNOSIS — J02.9 SORE THROAT: ICD-10-CM

## 2022-10-04 DIAGNOSIS — U07.1 COVID-19 VIRUS DETECTED: ICD-10-CM

## 2022-10-04 LAB
CTP QC/QA: YES
CTP QC/QA: YES
POC MOLECULAR INFLUENZA A AGN: NEGATIVE
POC MOLECULAR INFLUENZA B AGN: NEGATIVE
SARS-COV-2 RDRP RESP QL NAA+PROBE: POSITIVE

## 2022-10-04 PROCEDURE — 3077F PR MOST RECENT SYSTOLIC BLOOD PRESSURE >= 140 MM HG: ICD-10-PCS | Mod: CPTII,S$GLB,, | Performed by: EMERGENCY MEDICINE

## 2022-10-04 PROCEDURE — 1159F MED LIST DOCD IN RCRD: CPT | Mod: CPTII,S$GLB,, | Performed by: EMERGENCY MEDICINE

## 2022-10-04 PROCEDURE — 3079F PR MOST RECENT DIASTOLIC BLOOD PRESSURE 80-89 MM HG: ICD-10-PCS | Mod: CPTII,S$GLB,, | Performed by: EMERGENCY MEDICINE

## 2022-10-04 PROCEDURE — 3079F DIAST BP 80-89 MM HG: CPT | Mod: CPTII,S$GLB,, | Performed by: EMERGENCY MEDICINE

## 2022-10-04 PROCEDURE — 1160F PR REVIEW ALL MEDS BY PRESCRIBER/CLIN PHARMACIST DOCUMENTED: ICD-10-PCS | Mod: CPTII,S$GLB,, | Performed by: EMERGENCY MEDICINE

## 2022-10-04 PROCEDURE — 99214 OFFICE O/P EST MOD 30 MIN: CPT | Mod: S$GLB,,, | Performed by: EMERGENCY MEDICINE

## 2022-10-04 PROCEDURE — 1159F PR MEDICATION LIST DOCUMENTED IN MEDICAL RECORD: ICD-10-PCS | Mod: CPTII,S$GLB,, | Performed by: EMERGENCY MEDICINE

## 2022-10-04 PROCEDURE — 87502 INFLUENZA DNA AMP PROBE: CPT | Mod: QW,S$GLB,, | Performed by: EMERGENCY MEDICINE

## 2022-10-04 PROCEDURE — 3077F SYST BP >= 140 MM HG: CPT | Mod: CPTII,S$GLB,, | Performed by: EMERGENCY MEDICINE

## 2022-10-04 PROCEDURE — 3044F PR MOST RECENT HEMOGLOBIN A1C LEVEL <7.0%: ICD-10-PCS | Mod: CPTII,S$GLB,, | Performed by: EMERGENCY MEDICINE

## 2022-10-04 PROCEDURE — 87502 POCT INFLUENZA A/B MOLECULAR: ICD-10-PCS | Mod: QW,S$GLB,, | Performed by: EMERGENCY MEDICINE

## 2022-10-04 PROCEDURE — 3044F HG A1C LEVEL LT 7.0%: CPT | Mod: CPTII,S$GLB,, | Performed by: EMERGENCY MEDICINE

## 2022-10-04 PROCEDURE — 87635: ICD-10-PCS | Mod: QW,S$GLB,, | Performed by: EMERGENCY MEDICINE

## 2022-10-04 PROCEDURE — 3008F PR BODY MASS INDEX (BMI) DOCUMENTED: ICD-10-PCS | Mod: CPTII,S$GLB,, | Performed by: EMERGENCY MEDICINE

## 2022-10-04 PROCEDURE — 1160F RVW MEDS BY RX/DR IN RCRD: CPT | Mod: CPTII,S$GLB,, | Performed by: EMERGENCY MEDICINE

## 2022-10-04 PROCEDURE — 3008F BODY MASS INDEX DOCD: CPT | Mod: CPTII,S$GLB,, | Performed by: EMERGENCY MEDICINE

## 2022-10-04 PROCEDURE — 99214 PR OFFICE/OUTPT VISIT, EST, LEVL IV, 30-39 MIN: ICD-10-PCS | Mod: S$GLB,,, | Performed by: EMERGENCY MEDICINE

## 2022-10-04 PROCEDURE — 87635 SARS-COV-2 COVID-19 AMP PRB: CPT | Mod: QW,S$GLB,, | Performed by: EMERGENCY MEDICINE

## 2022-10-04 RX ORDER — PROMETHAZINE HYDROCHLORIDE AND DEXTROMETHORPHAN HYDROBROMIDE 6.25; 15 MG/5ML; MG/5ML
5 SYRUP ORAL EVERY 6 HOURS PRN
Qty: 180 ML | Refills: 0 | Status: SHIPPED | OUTPATIENT
Start: 2022-10-04 | End: 2022-10-14

## 2022-10-04 RX ORDER — NIRMATRELVIR AND RITONAVIR 300-100 MG
KIT ORAL
Qty: 30 TABLET | Refills: 0 | Status: SHIPPED | OUTPATIENT
Start: 2022-10-04 | End: 2022-10-31

## 2022-10-04 NOTE — PATIENT INSTRUCTIONS
Go to the Emergency Room if symptoms or condition worsens in any way      Choose 1 of the 3 medicines below and take as directed for the next 7 days    Zyrtec 10mg 1 tablet by mouth daily  OR  2. Claritin 10mg 1 tablet by mouth daily  OR  3. Allegra  180mg 1 tablet by mouth daily       Tylenol 500mg 2 tabs by mouth every 8 hours  Max = 8 tabs per day    Coricidin OTC as directed for runny nose and congestion    Hot liquids with natural honey for cough, sore throat, and congestion          Instructions for Patients with Confirmed or Suspected COVID-19    If you are awaiting your test result, you will either be called or it will be released to the patient portal.  If you have any questions about your test, please visit [email or web address] or call our COVID-19 information line at [phone].      Stay home and stay away from family members and friends. The CDC says, you can leave home after these three things have happened: 1) You have had no fever for at least 24 hours (that is 1 full day of no fever without the use of medicine that reduces fevers) 2) AND other symptoms have improved (for example, when your cough or shortness of breath have improved) 3) AND at least 5 days have passed since your symptoms first appeared.  Separate yourself from other people and animals in your home.  Call ahead before visiting your doctor.  Wear a facemask.  Cover your coughs and sneezes.  Wash your hands often with soap and water; hand  can be used, too.  Avoid sharing personal household items.  Wipe down surfaces used daily.  Monitor your symptoms. Seek prompt medical attention if your illness is worsening (e.g., difficulty breathing).   Before seeking care, call your healthcare provider.  If you have a medical emergency and need to call 911, notify the dispatch personnel that you have, or are being evaluated for COVID-19. If possible, put on a facemask before emergency medical services arrive.        Recommended  precautions for household members, intimate partners, and caregivers in a home setting of a patient with symptomatic laboratory-confirmed COVID-19 or a patient under investigation.  Household members, intimate partners, and caregivers in the home setting awaiting tests results have close contact with a person with symptomatic, laboratory-confirmed COVID-19 or a person under investigation. Close contacts should monitor their health; they should call their provider right away if they develop symptoms suggestive of COVID-19 (e.g., fever, cough, shortness of breath).    Close contacts should also follow these recommendations:  Make sure that you understand and can help the patient follow their provider's instructions for medication(s) and care. You should help the patient with basic needs in the home and provide support for getting groceries, prescriptions, and other personal needs.  Monitor the patient's symptoms. If the patient is getting sicker, call his or her healthcare provider and tell them that the patient has laboratory-confirmed COVID-19. If the patient has a medical emergency and you need to call 911, notify the dispatch personnel that the patient has, or is being evaluated for COVID-19.  Household members should stay in another room or be  from the patient. Household members should use a separate bedroom and bathroom, if available.  Prohibit visitors.  Household members should care for any pets in the home.  Make sure that shared spaces in the home have good air flow, such as by an air conditioner or an opened window, weather permitting.  Perform hand hygiene frequently. Wash your hands often with soap and water for at least 20 seconds or use an alcohol-based hand  (that contains > 60% alcohol) covering all surfaces of your hands and rubbing them together until they feel dry. Soap and water should be used preferentially.  Avoid touching your eyes, nose, and mouth.  The patient should wear a  facemask. If the patient is not able to wear a facemask (for example, because it causes trouble breathing), caregivers should wear a mask when they are in the same room as the patient.  Wear a disposable facemask and gloves when you touch or have contact with the patient's blood, stool, or body fluids, such as saliva, sputum, nasal mucus, vomit, urine.  Throw out disposable facemasks and gloves after using them. Do not reuse.  When removing personal protective equipment, first remove and dispose of gloves. Then, immediately clean your hands with soap and water or alcohol-based hand . Next, remove and dispose of facemask, and immediately clean your hands again with soap and water or alcohol-based hand .  You should not share dishes, drinking glasses, cups, eating utensils, towels, bedding, or other items with the patient. After the patient uses these items, you should wash them thoroughly (see below Wash laundry thoroughly).  Clean all high-touch surfaces, such as counters, tabletops, doorknobs, bathroom fixtures, toilets, phones, keyboards, tablets, and bedside tables, every day. Also, clean any surfaces that may have blood, stool, or body fluids on them.  Use a household cleaning spray or wipe, according to the label instructions. Labels contain instructions for safe and effective use of the cleaning product including precautions you should take when applying the product, such as wearing gloves and making sure you have good ventilation during use of the product.  Wash laundry thoroughly.  Immediately remove and wash clothes or bedding that have blood, stool, or body fluids on them.  Wear disposable gloves while handling soiled items and keep soiled items away from your body. Clean your hands (with soap and water or an alcohol-based hand ) immediately after removing your gloves.  Read and follow directions on labels of laundry or clothing items and detergent. In general, using a normal  laundry detergent according to washing machine instructions and dry thoroughly using the warmest temperatures recommended on the clothing label.  Place all used disposable gloves, facemasks, and other contaminated items in a lined container before disposing of them with other household waste. Clean your hands (with soap and water or an alcohol-based hand ) immediately after handling these items. Soap and water should be used preferentially if hands are visibly dirty.  Discuss any additional questions with your state or local health department or healthcare provider. Check available hours when contacting your local health department.    For more information see CDC link below.      https://www.cdc.gov/coronavirus/2019-ncov/hcp/guidance-prevent-spread.html#precautions        Sources:  [facility], [facility] and [facility]

## 2022-10-04 NOTE — PROGRESS NOTES
"Subjective:       Patient ID: Dina Martínez is a 52 y.o. female.    Vitals:  height is 5' 7" (1.702 m) and weight is 123.6 kg (272 lb 7.8 oz). Her temperature is 97.4 °F (36.3 °C). Her blood pressure is 144/86 (abnormal) and her pulse is 89. Her respiration is 18 and oxygen saturation is 97%.     Chief Complaint: Sore Throat (/)    Sore Throat   This is a new problem. The current episode started in the past 7 days. The problem has been unchanged. Neither side of throat is experiencing more pain than the other. There has been no fever (98.6, YESTERDAY 101.7). The fever has been present for 1 to 2 days. The pain is at a severity of 8/10. The pain is moderate. Associated symptoms include congestion, coughing, headaches and neck pain. Pertinent negatives include no abdominal pain, diarrhea, drooling, ear discharge, ear pain, hoarse voice, plugged ear sensation, shortness of breath, stridor, swollen glands, trouble swallowing or vomiting. She has had no exposure to strep or mono. She has tried acetaminophen (4 HOURS AGO tylenol) for the symptoms. The treatment provided mild relief.     Constitution: Positive for chills, fatigue and fever.   HENT:  Positive for congestion and sore throat. Negative for ear pain, ear discharge, drooling and trouble swallowing.    Neck: Positive for neck pain.   Respiratory:  Positive for cough. Negative for shortness of breath and stridor.    Gastrointestinal:  Negative for abdominal pain, vomiting and diarrhea.   Skin:  Negative for erythema.   Neurological:  Positive for headaches.     Objective:      Physical Exam   Constitutional: She is oriented to person, place, and time. She appears well-developed. She is cooperative.  Non-toxic appearance. She does not appear ill. No distress.   HENT:   Head: Normocephalic and atraumatic. Head is without abrasion, without contusion and without laceration.   Ears:   Right Ear: Hearing, tympanic membrane, external ear and ear canal normal.   Left " Ear: Hearing, tympanic membrane, external ear and ear canal normal.   Nose: Nose normal. No mucosal edema, rhinorrhea or nasal deformity. No epistaxis. Right sinus exhibits no maxillary sinus tenderness and no frontal sinus tenderness. Left sinus exhibits no maxillary sinus tenderness and no frontal sinus tenderness.   Mouth/Throat: Uvula is midline, oropharynx is clear and moist and mucous membranes are normal. No trismus in the jaw. Normal dentition. No uvula swelling. No oropharyngeal exudate, posterior oropharyngeal edema or posterior oropharyngeal erythema.   Oropharyngeal exam not performed due to risk of viral transmission during global pandemic-- risks outweigh benefits of exam          Comments: Oropharyngeal exam not performed due to risk of viral transmission during global pandemic-- risks outweigh benefits of exam      Eyes: Conjunctivae, EOM and lids are normal. Pupils are equal, round, and reactive to light. No scleral icterus.   Neck: Trachea normal and phonation normal. Neck supple. No edema present. No erythema present. No neck rigidity present.   Cardiovascular: Normal rate, regular rhythm, normal heart sounds and normal pulses.   Pulmonary/Chest: Effort normal and breath sounds normal. No stridor. No respiratory distress. She has no decreased breath sounds. She has no rhonchi.   Abdominal: Normal appearance.   Musculoskeletal: Normal range of motion.         General: No deformity. Normal range of motion.   Neurological: She is alert and oriented to person, place, and time. She exhibits normal muscle tone. Coordination normal.   Skin: Skin is warm, dry, intact, not diaphoretic, not pale and no rash. Capillary refill takes less than 2 seconds. No abrasion, No burn, No bruising, No erythema and No ecchymosis   Psychiatric: Her speech is normal and behavior is normal. Judgment and thought content normal.   Nursing note and vitals reviewed.      Assessment:       1. COVID-19    2. Sore throat           Plan:         COVID-19    Sore throat  -     POCT Influenza A/B MOLECULAR  -     POCT COVID-19 Rapid Screening    Other orders  -     promethazine-dextromethorphan (PROMETHAZINE-DM) 6.25-15 mg/5 mL Syrp; Take 5 mLs by mouth every 6 (six) hours as needed (cough or nausea).  Dispense: 180 mL; Refill: 0  -     nirmatrelvir-ritonavir (PAXLOVID, EUA,) 300 mg (150 mg x 2)-100 mg copackaged tablets (EUA); Take 3 tablets by mouth 2 (two) times daily. Each dose contains 2 nirmatrelvir (pink tablets) and 1 ritonavir (white tablet). Take all 3 tablets together  Dispense: 30 tablet; Refill: 0                 3  Patient Instructions   Go to the Emergency Room if symptoms or condition worsens in any way      Choose 1 of the 3 medicines below and take as directed for the next 7 days    Zyrtec 10mg 1 tablet by mouth daily  OR  2. Claritin 10mg 1 tablet by mouth daily  OR  3. Allegra  180mg 1 tablet by mouth daily       Tylenol 500mg 2 tabs by mouth every 8 hours  Max = 8 tabs per day    Coricidin OTC as directed for runny nose and congestion    Hot liquids with natural honey for cough, sore throat, and congestion          Instructions for Patients with Confirmed or Suspected COVID-19    If you are awaiting your test result, you will either be called or it will be released to the patient portal.  If you have any questions about your test, please visit [email or web address] or call our COVID-19 information line at [phone].      Stay home and stay away from family members and friends. The CDC says, you can leave home after these three things have happened: 1) You have had no fever for at least 24 hours (that is 1 full day of no fever without the use of medicine that reduces fevers) 2) AND other symptoms have improved (for example, when your cough or shortness of breath have improved) 3) AND at least 5 days have passed since your symptoms first appeared.  Separate yourself from other people and animals in your  home.  Call ahead before visiting your doctor.  Wear a facemask.  Cover your coughs and sneezes.  Wash your hands often with soap and water; hand  can be used, too.  Avoid sharing personal household items.  Wipe down surfaces used daily.  Monitor your symptoms. Seek prompt medical attention if your illness is worsening (e.g., difficulty breathing).   Before seeking care, call your healthcare provider.  If you have a medical emergency and need to call 911, notify the dispatch personnel that you have, or are being evaluated for COVID-19. If possible, put on a facemask before emergency medical services arrive.        Recommended precautions for household members, intimate partners, and caregivers in a home setting of a patient with symptomatic laboratory-confirmed COVID-19 or a patient under investigation.  Household members, intimate partners, and caregivers in the home setting awaiting tests results have close contact with a person with symptomatic, laboratory-confirmed COVID-19 or a person under investigation. Close contacts should monitor their health; they should call their provider right away if they develop symptoms suggestive of COVID-19 (e.g., fever, cough, shortness of breath).    Close contacts should also follow these recommendations:  Make sure that you understand and can help the patient follow their provider's instructions for medication(s) and care. You should help the patient with basic needs in the home and provide support for getting groceries, prescriptions, and other personal needs.  Monitor the patient's symptoms. If the patient is getting sicker, call his or her healthcare provider and tell them that the patient has laboratory-confirmed COVID-19. If the patient has a medical emergency and you need to call 911, notify the dispatch personnel that the patient has, or is being evaluated for COVID-19.  Household members should stay in another room or be  from the patient. Household  members should use a separate bedroom and bathroom, if available.  Prohibit visitors.  Household members should care for any pets in the home.  Make sure that shared spaces in the home have good air flow, such as by an air conditioner or an opened window, weather permitting.  Perform hand hygiene frequently. Wash your hands often with soap and water for at least 20 seconds or use an alcohol-based hand  (that contains > 60% alcohol) covering all surfaces of your hands and rubbing them together until they feel dry. Soap and water should be used preferentially.  Avoid touching your eyes, nose, and mouth.  The patient should wear a facemask. If the patient is not able to wear a facemask (for example, because it causes trouble breathing), caregivers should wear a mask when they are in the same room as the patient.  Wear a disposable facemask and gloves when you touch or have contact with the patient's blood, stool, or body fluids, such as saliva, sputum, nasal mucus, vomit, urine.  Throw out disposable facemasks and gloves after using them. Do not reuse.  When removing personal protective equipment, first remove and dispose of gloves. Then, immediately clean your hands with soap and water or alcohol-based hand . Next, remove and dispose of facemask, and immediately clean your hands again with soap and water or alcohol-based hand .  You should not share dishes, drinking glasses, cups, eating utensils, towels, bedding, or other items with the patient. After the patient uses these items, you should wash them thoroughly (see below Wash laundry thoroughly).  Clean all high-touch surfaces, such as counters, tabletops, doorknobs, bathroom fixtures, toilets, phones, keyboards, tablets, and bedside tables, every day. Also, clean any surfaces that may have blood, stool, or body fluids on them.  Use a household cleaning spray or wipe, according to the label instructions. Labels contain instructions  for safe and effective use of the cleaning product including precautions you should take when applying the product, such as wearing gloves and making sure you have good ventilation during use of the product.  Wash laundry thoroughly.  Immediately remove and wash clothes or bedding that have blood, stool, or body fluids on them.  Wear disposable gloves while handling soiled items and keep soiled items away from your body. Clean your hands (with soap and water or an alcohol-based hand ) immediately after removing your gloves.  Read and follow directions on labels of laundry or clothing items and detergent. In general, using a normal laundry detergent according to washing machine instructions and dry thoroughly using the warmest temperatures recommended on the clothing label.  Place all used disposable gloves, facemasks, and other contaminated items in a lined container before disposing of them with other household waste. Clean your hands (with soap and water or an alcohol-based hand ) immediately after handling these items. Soap and water should be used preferentially if hands are visibly dirty.  Discuss any additional questions with your state or local health department or healthcare provider. Check available hours when contacting your local health department.    For more information see CDC link below.      https://www.cdc.gov/coronavirus/2019-ncov/hcp/guidance-prevent-spread.html#precautions        Sources:  [facility], [place] Department of [facility] and [facility]

## 2022-10-04 NOTE — LETTER
October 4, 2022      [facility] [address] [address]  Phone: [phone]  Fax: [phone]       Patient: Dina Martínez   YOB: 1970  Date of Visit: 10/04/2022    To Whom It May Concern:    Sherri Martínez  was at [facility] on 10/04/2022. The patient may return to work/school on 10/8/22 with no restrictions. If you have any questions or concerns, or if I can be of further assistance, please do not hesitate to contact me.    Sincerely,      Wallace Boone III, MD

## 2022-10-28 ENCOUNTER — PATIENT MESSAGE (OUTPATIENT)
Dept: INTERNAL MEDICINE | Facility: CLINIC | Age: 52
End: 2022-10-28
Payer: COMMERCIAL

## 2022-10-31 RX ORDER — NORTRIPTYLINE HYDROCHLORIDE 10 MG/1
CAPSULE ORAL
Qty: 45 CAPSULE | Refills: 1 | Status: SHIPPED | OUTPATIENT
Start: 2022-10-31 | End: 2023-02-07

## 2022-11-01 ENCOUNTER — PATIENT MESSAGE (OUTPATIENT)
Dept: INTERNAL MEDICINE | Facility: CLINIC | Age: 52
End: 2022-11-01
Payer: COMMERCIAL

## 2022-12-22 ENCOUNTER — OFFICE VISIT (OUTPATIENT)
Dept: URGENT CARE | Facility: CLINIC | Age: 52
End: 2022-12-22
Payer: COMMERCIAL

## 2022-12-22 VITALS
HEART RATE: 75 BPM | SYSTOLIC BLOOD PRESSURE: 138 MMHG | DIASTOLIC BLOOD PRESSURE: 63 MMHG | BODY MASS INDEX: 42.69 KG/M2 | RESPIRATION RATE: 20 BRPM | WEIGHT: 272 LBS | TEMPERATURE: 99 F | HEIGHT: 67 IN | OXYGEN SATURATION: 97 %

## 2022-12-22 DIAGNOSIS — M25.561 ACUTE PAIN OF RIGHT KNEE: ICD-10-CM

## 2022-12-22 DIAGNOSIS — S90.31XA CONTUSION OF RIGHT FOOT, INITIAL ENCOUNTER: Primary | ICD-10-CM

## 2022-12-22 DIAGNOSIS — M17.11 PRIMARY OSTEOARTHRITIS OF RIGHT KNEE: ICD-10-CM

## 2022-12-22 PROCEDURE — 1159F PR MEDICATION LIST DOCUMENTED IN MEDICAL RECORD: ICD-10-PCS | Mod: CPTII,S$GLB,, | Performed by: FAMILY MEDICINE

## 2022-12-22 PROCEDURE — 99214 PR OFFICE/OUTPT VISIT, EST, LEVL IV, 30-39 MIN: ICD-10-PCS | Mod: S$GLB,,, | Performed by: FAMILY MEDICINE

## 2022-12-22 PROCEDURE — 3008F BODY MASS INDEX DOCD: CPT | Mod: CPTII,S$GLB,, | Performed by: FAMILY MEDICINE

## 2022-12-22 PROCEDURE — 1159F MED LIST DOCD IN RCRD: CPT | Mod: CPTII,S$GLB,, | Performed by: FAMILY MEDICINE

## 2022-12-22 PROCEDURE — 3075F SYST BP GE 130 - 139MM HG: CPT | Mod: CPTII,S$GLB,, | Performed by: FAMILY MEDICINE

## 2022-12-22 PROCEDURE — 73562 X-RAY EXAM OF KNEE 3: CPT | Mod: RT,S$GLB,, | Performed by: RADIOLOGY

## 2022-12-22 PROCEDURE — 99214 OFFICE O/P EST MOD 30 MIN: CPT | Mod: S$GLB,,, | Performed by: FAMILY MEDICINE

## 2022-12-22 PROCEDURE — 3044F HG A1C LEVEL LT 7.0%: CPT | Mod: CPTII,S$GLB,, | Performed by: FAMILY MEDICINE

## 2022-12-22 PROCEDURE — 73620 X-RAY EXAM OF FOOT: CPT | Mod: RT,S$GLB,, | Performed by: RADIOLOGY

## 2022-12-22 PROCEDURE — 3078F DIAST BP <80 MM HG: CPT | Mod: CPTII,S$GLB,, | Performed by: FAMILY MEDICINE

## 2022-12-22 PROCEDURE — 3078F PR MOST RECENT DIASTOLIC BLOOD PRESSURE < 80 MM HG: ICD-10-PCS | Mod: CPTII,S$GLB,, | Performed by: FAMILY MEDICINE

## 2022-12-22 PROCEDURE — 73620 XR FOOT 2 VIEW RIGHT: ICD-10-PCS | Mod: RT,S$GLB,, | Performed by: RADIOLOGY

## 2022-12-22 PROCEDURE — 3008F PR BODY MASS INDEX (BMI) DOCUMENTED: ICD-10-PCS | Mod: CPTII,S$GLB,, | Performed by: FAMILY MEDICINE

## 2022-12-22 PROCEDURE — 73562 XR KNEE 3 VIEW RIGHT: ICD-10-PCS | Mod: RT,S$GLB,, | Performed by: RADIOLOGY

## 2022-12-22 PROCEDURE — 3075F PR MOST RECENT SYSTOLIC BLOOD PRESS GE 130-139MM HG: ICD-10-PCS | Mod: CPTII,S$GLB,, | Performed by: FAMILY MEDICINE

## 2022-12-22 PROCEDURE — 3044F PR MOST RECENT HEMOGLOBIN A1C LEVEL <7.0%: ICD-10-PCS | Mod: CPTII,S$GLB,, | Performed by: FAMILY MEDICINE

## 2022-12-22 RX ORDER — DICLOFENAC SODIUM 75 MG/1
75 TABLET, DELAYED RELEASE ORAL 2 TIMES DAILY
Qty: 40 TABLET | Refills: 1 | Status: SHIPPED | OUTPATIENT
Start: 2022-12-22 | End: 2023-02-07 | Stop reason: SDUPTHER

## 2022-12-22 NOTE — PROGRESS NOTES
"Subjective:       Patient ID: Dina Martínez is a 52 y.o. female.    Vitals:  height is 5' 7" (1.702 m) and weight is 123.4 kg (272 lb). Her temperature is 98.8 °F (37.1 °C). Her blood pressure is 138/63 and her pulse is 75. Her respiration is 20 and oxygen saturation is 97%.     Chief Complaint: Toe Pain    Pt reports that she fell out the bed and think she sprain or broke her pinky toe on right foot. Pt reports some pain in knees as well. Pt denies hitting head.   Pt rolled over her bed as she tried to take care of Puppy and landed on left knee  She is also having right knee pain for a while, no injury or trauma      Toe Pain   The incident occurred 12 to 24 hours ago. The incident occurred at home. The injury mechanism was a fall. The pain is present in the right foot, right toes, right knee and left knee. The quality of the pain is described as burning and aching. The pain is at a severity of 8/10. The pain is moderate. The pain has been Constant since onset. Associated symptoms include an inability to bear weight. Pertinent negatives include no loss of motion, loss of sensation, numbness or tingling. She reports no foreign bodies present. The symptoms are aggravated by movement, palpation and weight bearing. She has tried ice (tylenol) for the symptoms. The treatment provided mild relief.     Neurological:  Negative for numbness.     Objective:      Physical Exam   Constitutional: She is oriented to person, place, and time. She appears well-developed. She is cooperative.  Non-toxic appearance. She does not appear ill. No distress.   HENT:   Head: Normocephalic and atraumatic.   Ears:   Right Ear: Hearing, tympanic membrane, external ear and ear canal normal.   Left Ear: Hearing, tympanic membrane, external ear and ear canal normal.   Nose: Nose normal. No mucosal edema, rhinorrhea or nasal deformity. No epistaxis. Right sinus exhibits no maxillary sinus tenderness and no frontal sinus tenderness. Left sinus " exhibits no maxillary sinus tenderness and no frontal sinus tenderness.   Mouth/Throat: Uvula is midline, oropharynx is clear and moist and mucous membranes are normal. Mucous membranes are moist. No trismus in the jaw. Normal dentition. No uvula swelling. No posterior oropharyngeal erythema. Oropharynx is clear.   Eyes: Conjunctivae and lids are normal. Right eye exhibits no discharge. Left eye exhibits no discharge. No scleral icterus.   Neck: Trachea normal and phonation normal. Neck supple.   Cardiovascular: Normal rate, regular rhythm, normal heart sounds and normal pulses.   Pulmonary/Chest: Effort normal and breath sounds normal. No respiratory distress.   Abdominal: Normal appearance and bowel sounds are normal. She exhibits no distension and no mass. Soft. There is no abdominal tenderness.   Musculoskeletal: Normal range of motion.         General: No deformity. Normal range of motion.      Comments: Minimal swelling right 5th toe  Right knee no swelling or warmth  Mild crepitus on movement of right knee      Neurological: She is alert and oriented to person, place, and time. She exhibits normal muscle tone. Coordination normal.   Skin: Skin is warm, dry, intact, not diaphoretic and not pale.   Psychiatric: Her speech is normal and behavior is normal. Judgment and thought content normal.   Nursing note and vitals reviewed.      Assessment:       1. Contusion of right foot, initial encounter    2. Acute pain of right knee    3. Primary osteoarthritis of right knee          Plan:         Contusion of right foot, initial encounter  -     X-Ray Foot 2 View Right; Future; Expected date: 12/22/2022    Acute pain of right knee  -     X-Ray Knee 3 View Right; Future; Expected date: 12/22/2022    Primary osteoarthritis of right knee    Other orders  -     diclofenac (VOLTAREN) 75 MG EC tablet; Take 1 tablet (75 mg total) by mouth 2 (two) times daily.  Dispense: 40 tablet; Refill: 1         Navid tape on 5th  toe    Knee brace  and follow up with PCP

## 2023-02-07 ENCOUNTER — OFFICE VISIT (OUTPATIENT)
Dept: INTERNAL MEDICINE | Facility: CLINIC | Age: 53
End: 2023-02-07
Payer: COMMERCIAL

## 2023-02-07 VITALS
DIASTOLIC BLOOD PRESSURE: 78 MMHG | HEART RATE: 74 BPM | SYSTOLIC BLOOD PRESSURE: 124 MMHG | WEIGHT: 273.56 LBS | HEIGHT: 67 IN | BODY MASS INDEX: 42.93 KG/M2 | OXYGEN SATURATION: 100 %

## 2023-02-07 DIAGNOSIS — E11.9 TYPE 2 DIABETES MELLITUS WITHOUT COMPLICATION, WITHOUT LONG-TERM CURRENT USE OF INSULIN: Primary | ICD-10-CM

## 2023-02-07 DIAGNOSIS — G47.33 OBSTRUCTIVE SLEEP APNEA SYNDROME, SEVERE: ICD-10-CM

## 2023-02-07 DIAGNOSIS — I10 ESSENTIAL HYPERTENSION, BENIGN: ICD-10-CM

## 2023-02-07 DIAGNOSIS — Z12.31 SCREENING MAMMOGRAM FOR BREAST CANCER: ICD-10-CM

## 2023-02-07 DIAGNOSIS — I10 ESSENTIAL HYPERTENSION: ICD-10-CM

## 2023-02-07 DIAGNOSIS — E66.01 MORBID OBESITY WITH BMI OF 45.0-49.9, ADULT: ICD-10-CM

## 2023-02-07 DIAGNOSIS — Z80.0 FAMILY HISTORY OF COLON CANCER: ICD-10-CM

## 2023-02-07 DIAGNOSIS — E55.9 VITAMIN D DEFICIENCY: ICD-10-CM

## 2023-02-07 PROCEDURE — 99214 PR OFFICE/OUTPT VISIT, EST, LEVL IV, 30-39 MIN: ICD-10-PCS | Mod: S$GLB,,, | Performed by: INTERNAL MEDICINE

## 2023-02-07 PROCEDURE — 3044F HG A1C LEVEL LT 7.0%: CPT | Mod: CPTII,S$GLB,, | Performed by: INTERNAL MEDICINE

## 2023-02-07 PROCEDURE — 99214 OFFICE O/P EST MOD 30 MIN: CPT | Mod: S$GLB,,, | Performed by: INTERNAL MEDICINE

## 2023-02-07 PROCEDURE — 1159F PR MEDICATION LIST DOCUMENTED IN MEDICAL RECORD: ICD-10-PCS | Mod: CPTII,S$GLB,, | Performed by: INTERNAL MEDICINE

## 2023-02-07 PROCEDURE — 3078F DIAST BP <80 MM HG: CPT | Mod: CPTII,S$GLB,, | Performed by: INTERNAL MEDICINE

## 2023-02-07 PROCEDURE — 99999 PR PBB SHADOW E&M-EST. PATIENT-LVL IV: ICD-10-PCS | Mod: PBBFAC,,, | Performed by: INTERNAL MEDICINE

## 2023-02-07 PROCEDURE — 3008F BODY MASS INDEX DOCD: CPT | Mod: CPTII,S$GLB,, | Performed by: INTERNAL MEDICINE

## 2023-02-07 PROCEDURE — 3078F PR MOST RECENT DIASTOLIC BLOOD PRESSURE < 80 MM HG: ICD-10-PCS | Mod: CPTII,S$GLB,, | Performed by: INTERNAL MEDICINE

## 2023-02-07 PROCEDURE — 3074F SYST BP LT 130 MM HG: CPT | Mod: CPTII,S$GLB,, | Performed by: INTERNAL MEDICINE

## 2023-02-07 PROCEDURE — 1159F MED LIST DOCD IN RCRD: CPT | Mod: CPTII,S$GLB,, | Performed by: INTERNAL MEDICINE

## 2023-02-07 PROCEDURE — 3044F PR MOST RECENT HEMOGLOBIN A1C LEVEL <7.0%: ICD-10-PCS | Mod: CPTII,S$GLB,, | Performed by: INTERNAL MEDICINE

## 2023-02-07 PROCEDURE — 99999 PR PBB SHADOW E&M-EST. PATIENT-LVL IV: CPT | Mod: PBBFAC,,, | Performed by: INTERNAL MEDICINE

## 2023-02-07 PROCEDURE — 3072F PR LOW RISK FOR RETINOPATHY: ICD-10-PCS | Mod: CPTII,S$GLB,, | Performed by: INTERNAL MEDICINE

## 2023-02-07 PROCEDURE — 3008F PR BODY MASS INDEX (BMI) DOCUMENTED: ICD-10-PCS | Mod: CPTII,S$GLB,, | Performed by: INTERNAL MEDICINE

## 2023-02-07 PROCEDURE — 3074F PR MOST RECENT SYSTOLIC BLOOD PRESSURE < 130 MM HG: ICD-10-PCS | Mod: CPTII,S$GLB,, | Performed by: INTERNAL MEDICINE

## 2023-02-07 PROCEDURE — 3072F LOW RISK FOR RETINOPATHY: CPT | Mod: CPTII,S$GLB,, | Performed by: INTERNAL MEDICINE

## 2023-02-07 RX ORDER — PANTOPRAZOLE SODIUM 40 MG/1
40 TABLET, DELAYED RELEASE ORAL DAILY
Qty: 90 TABLET | Refills: 3 | Status: SHIPPED | OUTPATIENT
Start: 2023-02-07 | End: 2024-03-24 | Stop reason: SDUPTHER

## 2023-02-07 RX ORDER — TRIAMTERENE/HYDROCHLOROTHIAZID 37.5-25 MG
1 TABLET ORAL DAILY
Qty: 90 TABLET | Refills: 4 | Status: SHIPPED | OUTPATIENT
Start: 2023-02-07 | End: 2024-03-24 | Stop reason: SDUPTHER

## 2023-02-07 RX ORDER — METFORMIN HYDROCHLORIDE 500 MG/1
2000 TABLET, EXTENDED RELEASE ORAL
Qty: 360 TABLET | Refills: 4 | Status: SHIPPED | OUTPATIENT
Start: 2023-02-07 | End: 2024-03-24 | Stop reason: SDUPTHER

## 2023-02-07 RX ORDER — DICLOFENAC SODIUM 75 MG/1
75 TABLET, DELAYED RELEASE ORAL 2 TIMES DAILY PRN
Qty: 60 TABLET | Refills: 1 | Status: SHIPPED | OUTPATIENT
Start: 2023-02-07 | End: 2024-03-24 | Stop reason: SDUPTHER

## 2023-02-07 RX ORDER — IBUPROFEN 800 MG/1
800 TABLET ORAL 3 TIMES DAILY PRN
Qty: 60 TABLET | Refills: 3 | OUTPATIENT
Start: 2023-02-07

## 2023-02-07 RX ORDER — ERGOCALCIFEROL 1.25 MG/1
50000 CAPSULE ORAL WEEKLY
Qty: 12 CAPSULE | Refills: 4 | Status: SHIPPED | OUTPATIENT
Start: 2023-02-07 | End: 2024-03-24 | Stop reason: SDUPTHER

## 2023-02-07 NOTE — PROGRESS NOTES
"Chief Complaint: Follow up of multiple issues    HPI:THis is a 52 year old woman who presnets for above     Urinary urgency is better with scheduled toileting.      Her stomach has been doing well with pantoprazole 40 mg in am and famotidine 40 mg in the evening.     She rolled out of the bed trying to  her dog 12/22/2022   She had right 5th toe pain which has resolved.  She has had right knee pain since then.  She takes diclofenac as needed which helps.      She has the correct shoes and feet are doing better. She is not taking gabapentin routinely She does her exercises at home. She saw Dr Triplett in orthoepdics for her foot pain. She had a MRI on 3/4/21."The MRI of the left hindfoot reveals significant tenosynovitis with fluid around the posterior tibial tendon and some mild tendinosis near the insertion of the posterior tibial tendon".  She was put in a boot and has weaned out of the boot.  Dr Triplett stopped her from working from March 1 to May 10, 2021.     No dizzines. She takes loratadine 10 mg daily as needed which helps. No sinus congestion currently - comes and goes       She gets fever blisters once a year and valtrex 1000 2 tablets twice daily for only one day which works.  No fever blisters     She has sleep apnea. HEr sleep study 8/1/16 she has severe obstructive sleep apnea. She has a CPAP machine - she has been wearing the CPAP more than usual (3-4 nights a week) - wears 6-7 hours at night.  The CPAP machine helps. She is wearing the CPAP machine more     She is off Trulicity - gave her stomach problems which improved off trulicity.         She continues metformin  mg 2 tablets daily..  Blood sugars are doing well.  No nausea, vomiting, conttipation, diarrhea.         Her blood pressure is controlled on triamterene hct 37.5/25 one tablet daily.  NO chest pain, shortness of breath, paplitations.      She is taking vitamin D 50,000 units once a week     Mood is doing ok. No anxiety or " depression     She rides the bike for 30 minutes 3 times weekly at [place].  She is working nights at [employer] for [employer].              Past Medical History:   Diagnosis Date    Diabetes mellitus      Hypertension      Obstructive sleep apnea syndrome, severe                        Past Surgical History:   Procedure Laterality Date    COLONOSCOPY N/A 8/22/2018     Procedure: COLONOSCOPY;  Surgeon: Bianca Cooney MD;  Location: [facility];  Service: Endoscopy;  Laterality: N/A;    ENDOMETRIAL ABLATION        ESOPHAGOGASTRODUODENOSCOPY N/A 6/27/2018     Procedure: ESOPHAGOGASTRODUODENOSCOPY (EGD);  Surgeon: Bianca Cooney MD;  Location: [facility];  Service: Endoscopy;  Laterality: N/A;    TOTAL ABDOMINAL HYSTERECTOMY W/ BILATERAL SALPINGOOPHORECTOMY   11/12/2015      Social History                   Socioeconomic History    Marital status:        Spouse name: Not on file    Number of children: Not on file    Years of education: Not on file    Highest education level: Not on file   Occupational History    Occupation: security   Social Needs    Financial resource strain: Not hard at all    Food insecurity       Worry: Never true       Inability: Never true    Transportation needs       Medical: No       Non-medical: No   Tobacco Use    Smoking status: Never Smoker    Smokeless tobacco: Never Used   Substance and Sexual Activity    Alcohol use: Yes       Alcohol/week: 1.0 standard drinks       Types: 1 Cans of beer per week       Frequency: Monthly or less       Drinks per session: 1 or 2       Binge frequency: Never       Comment: 1-2 drinks a week    Drug use: No    Sexual activity: Never       Birth control/protection: None   Lifestyle    Physical activity       Days per week: Not on file       Minutes per session: Not on file    Stress: Not on file   Relationships    Social connections       Talks on phone: Once a week       Gets together: Never       Attends [employer] service:  "Never       Active member of club or organization: No       Attends meetings of clubs or organizations: Never       Relationship status: Living with partner   Other Topics Concern    Are you pregnant or think you may be? Not Asked    Breast-feeding Not Asked   Social History Narrative    Not on file                   Family Status   Relation Name Status    Father       Mother   Alive    MGM       MGF       PGM       PGF       Brother   Alive    Neg Hx   (Not Specified)               Meds and allergies: updated on Epic     REVIEW OF SYSTEMS: No fevers, chills, night sweats, fatigue, visual change, hearing loss, sinus congestion, sore throat, chest pain, shortness of breath, nausea, vomiting, constipation, diarrhea, dysuria, hematuria, polydipsia, polyuria, other joint pain, muscle pain, headaches, anxiety, depression, insomnia.            Physical exam:    /78 (BP Location: Right arm, Patient Position: Sitting)   Pulse 74   Ht 5' 7" (1.702 m)   Wt 124.1 kg (273 lb 9.5 oz)   LMP 10/25/2015   SpO2 100%   BMI 42.85 kg/m²      General: alert, oriented x 3, no apparent distress.  Affect normal  HEENT: Conjunctivae: anicteric, PERRL, EOMI, TM clear fluid on left, normal on right, Oralpharynx clear  Neck: supple, no thyroid enlargement, no cervical lymphadenopathy  Resp: effort normal, lungs clear bilaterally  CV: Regular rate and rhythm without murmurs, gallops or rubs, no lower extremity edema,            Labs 23 reviewed           Assessment/Plan:  Knee pain - continue diclofenac.  If worsene, will do physical therapy.   Left Foot pain and ankle pain -improved  HTN - controlled  Diabetes -increase metformin XR to 500 mg 4 tabelts daily.   Vitamin D deficiency - on replacement.   Colonoscopy due 2018 - normal - due in 5 years -  - father  of colon cancer at age 74.  EGD normal 18  MMG 3/22     Follow up in 6 months, [place] if issues  "

## 2023-03-10 ENCOUNTER — TELEPHONE (OUTPATIENT)
Dept: INTERNAL MEDICINE | Facility: CLINIC | Age: 53
End: 2023-03-10
Payer: COMMERCIAL

## 2023-03-10 ENCOUNTER — PATIENT MESSAGE (OUTPATIENT)
Dept: INTERNAL MEDICINE | Facility: CLINIC | Age: 53
End: 2023-03-10
Payer: COMMERCIAL

## 2023-03-10 RX ORDER — CYCLOBENZAPRINE HCL 5 MG
5 TABLET ORAL 3 TIMES DAILY PRN
Qty: 30 TABLET | Refills: 0 | Status: SHIPPED | OUTPATIENT
Start: 2023-03-10 | End: 2023-03-10

## 2023-03-10 RX ORDER — PREDNISONE 10 MG/1
TABLET ORAL
Qty: 10 TABLET | Refills: 0 | Status: SHIPPED | OUTPATIENT
Start: 2023-03-10 | End: 2023-05-23

## 2023-03-10 RX ORDER — TRAMADOL HYDROCHLORIDE 50 MG/1
50 TABLET ORAL EVERY 6 HOURS
Qty: 30 TABLET | Refills: 0 | Status: SHIPPED | OUTPATIENT
Start: 2023-03-10 | End: 2023-05-23

## 2023-03-10 RX ORDER — TIZANIDINE 4 MG/1
4 TABLET ORAL EVERY 6 HOURS PRN
Qty: 60 TABLET | Refills: 3 | Status: SHIPPED | OUTPATIENT
Start: 2023-03-10 | End: 2023-05-23

## 2023-03-10 RX ORDER — KETOROLAC TROMETHAMINE 10 MG/1
10 TABLET, FILM COATED ORAL EVERY 6 HOURS
Qty: 20 TABLET | Refills: 0 | Status: SHIPPED | OUTPATIENT
Start: 2023-03-10 | End: 2023-03-10

## 2023-03-10 NOTE — TELEPHONE ENCOUNTER
----- Message from Alba Burris sent at 3/10/2023  8:08 AM CST -----  Contact: [phone] @ Patient  Good Morning  Patient went to ER on 03/7/2023 due to back pain. Patient is taking cyclobenzaprine (FLEXERIL) 5 MG tablet and ketorolac (TORADOL) 10 mg tablet . Patient would like to know if Dr could send another Rx that could help to [facility]      Please call and advise

## 2023-03-10 NOTE — TELEPHONE ENCOUNTER
Spoke with patient.  No injury  Pain started on 3/5/23 and went to the ER on 3/7/23  No nausea, vomiting, constipation, diarrhea.   Heating pad helps a little.   Flexeril  and toradol have not helped.   Pain is keeping her awake at night  Lying down makes the pain worse.  Certain position

## 2023-03-22 ENCOUNTER — PATIENT MESSAGE (OUTPATIENT)
Dept: INTERNAL MEDICINE | Facility: CLINIC | Age: 53
End: 2023-03-22
Payer: COMMERCIAL

## 2023-03-22 DIAGNOSIS — E11.9 TYPE 2 DIABETES MELLITUS WITHOUT COMPLICATION, WITHOUT LONG-TERM CURRENT USE OF INSULIN: Primary | ICD-10-CM

## 2023-03-28 RX ORDER — DULAGLUTIDE 0.75 MG/.5ML
0.75 INJECTION, SOLUTION SUBCUTANEOUS
Qty: 4 PEN | Refills: 11 | Status: SHIPPED | OUTPATIENT
Start: 2023-03-28 | End: 2023-05-23

## 2023-03-29 ENCOUNTER — HOSPITAL ENCOUNTER (OUTPATIENT)
Dept: RADIOLOGY | Facility: HOSPITAL | Age: 53
Discharge: HOME | End: 2023-03-29
Attending: INTERNAL MEDICINE
Payer: COMMERCIAL

## 2023-03-29 ENCOUNTER — PATIENT MESSAGE (OUTPATIENT)
Dept: INTERNAL MEDICINE | Facility: CLINIC | Age: 53
End: 2023-03-29
Payer: COMMERCIAL

## 2023-03-29 VITALS — BODY MASS INDEX: 42.57 KG/M2 | WEIGHT: 280 LBS

## 2023-03-29 DIAGNOSIS — Z12.31 SCREENING MAMMOGRAM FOR BREAST CANCER: ICD-10-CM

## 2023-03-29 PROCEDURE — 77063 BREAST TOMOSYNTHESIS BI: CPT | Mod: 26,,, | Performed by: RADIOLOGY

## 2023-03-29 PROCEDURE — 77067 SCR MAMMO BI INCL CAD: CPT | Mod: TC

## 2023-03-29 PROCEDURE — 77067 SCR MAMMO BI INCL CAD: CPT | Mod: 26,,, | Performed by: RADIOLOGY

## 2023-03-29 PROCEDURE — 77067 MAMMO DIGITAL SCREENING BILAT WITH TOMO: ICD-10-PCS | Mod: 26,,, | Performed by: RADIOLOGY

## 2023-03-29 PROCEDURE — 77063 MAMMO DIGITAL SCREENING BILAT WITH TOMO: ICD-10-PCS | Mod: 26,,, | Performed by: RADIOLOGY

## 2023-05-02 ENCOUNTER — TELEPHONE (OUTPATIENT)
Dept: INTERNAL MEDICINE | Facility: CLINIC | Age: 53
End: 2023-05-02

## 2023-05-03 NOTE — TELEPHONE ENCOUNTER
----- Message from Adeola Rangel sent at 5/2/2023  5:55 PM CDT -----  Contact: [phone]  Patient is in need of a new colonoscopy referral due to expiration date needing to be pushed further out. Thanks

## 2023-05-05 ENCOUNTER — TELEPHONE (OUTPATIENT)
Dept: INTERNAL MEDICINE | Facility: CLINIC | Age: 53
End: 2023-05-05
Payer: COMMERCIAL

## 2023-05-05 DIAGNOSIS — Z84.89 FAMILY HISTORY OF BENIGN NEOPLASM OF COLON: Primary | ICD-10-CM

## 2023-05-05 NOTE — TELEPHONE ENCOUNTER
----- Message from Giovany Campuzano sent at 5/5/2023 11:46 AM CDT -----  Contact: self [phone]  Pt requesting a call in regards to an order for colonoscopy stated pcp need to re-submit an order.    Please call and advise

## 2023-05-11 ENCOUNTER — TELEPHONE (OUTPATIENT)
Dept: ENDOSCOPY | Facility: HOSPITAL | Age: 53
End: 2023-05-11
Payer: COMMERCIAL

## 2023-05-11 NOTE — TELEPHONE ENCOUNTER
Patient called to Schedule PAT virtual audio only appointment with nurse Patient is scheduled for PAT appointment on 09/20/2023 at 09:00 am patient will be contacted at scheduled time.

## 2023-05-21 ENCOUNTER — PATIENT MESSAGE (OUTPATIENT)
Dept: INTERNAL MEDICINE | Facility: CLINIC | Age: 53
End: 2023-05-21
Payer: COMMERCIAL

## 2023-05-22 ENCOUNTER — PATIENT MESSAGE (OUTPATIENT)
Dept: INTERNAL MEDICINE | Facility: CLINIC | Age: 53
End: 2023-05-22
Payer: COMMERCIAL

## 2023-05-23 ENCOUNTER — OFFICE VISIT (OUTPATIENT)
Dept: INTERNAL MEDICINE | Facility: CLINIC | Age: 53
End: 2023-05-23
Payer: COMMERCIAL

## 2023-05-23 ENCOUNTER — LAB VISIT (OUTPATIENT)
Dept: LAB | Facility: HOSPITAL | Age: 53
End: 2023-05-23
Attending: INTERNAL MEDICINE
Payer: COMMERCIAL

## 2023-05-23 VITALS
BODY MASS INDEX: 42.93 KG/M2 | WEIGHT: 273.56 LBS | DIASTOLIC BLOOD PRESSURE: 84 MMHG | HEIGHT: 67 IN | SYSTOLIC BLOOD PRESSURE: 128 MMHG | OXYGEN SATURATION: 98 % | HEART RATE: 80 BPM

## 2023-05-23 DIAGNOSIS — E11.9 NEW ONSET TYPE 2 DIABETES MELLITUS: ICD-10-CM

## 2023-05-23 DIAGNOSIS — R10.9 ABDOMINAL DISCOMFORT: Primary | ICD-10-CM

## 2023-05-23 DIAGNOSIS — R35.0 URINARY FREQUENCY: ICD-10-CM

## 2023-05-23 LAB
ALBUMIN/CREAT UR: 5.4 UG/MG (ref 0–30)
BILIRUB UR QL STRIP: NEGATIVE
CLARITY UR REFRACT.AUTO: CLEAR
COLOR UR AUTO: YELLOW
CREAT UR-MCNC: 129 MG/DL (ref 15–325)
ESTIMATED AVG GLUCOSE: 131 MG/DL (ref 68–131)
GLUCOSE UR QL STRIP: NEGATIVE
HBA1C MFR BLD: 6.2 % (ref 4–5.6)
HGB UR QL STRIP: NEGATIVE
KETONES UR QL STRIP: NEGATIVE
LEUKOCYTE ESTERASE UR QL STRIP: NEGATIVE
MICROALBUMIN UR DL<=1MG/L-MCNC: 7 UG/ML
NITRITE UR QL STRIP: NEGATIVE
PH UR STRIP: 5 [PH] (ref 5–8)
PROT UR QL STRIP: NEGATIVE
SP GR UR STRIP: 1.01 (ref 1–1.03)
URN SPEC COLLECT METH UR: NORMAL

## 2023-05-23 PROCEDURE — 81003 URINALYSIS AUTO W/O SCOPE: CPT | Performed by: INTERNAL MEDICINE

## 2023-05-23 PROCEDURE — 3008F PR BODY MASS INDEX (BMI) DOCUMENTED: ICD-10-PCS | Mod: CPTII,S$GLB,, | Performed by: INTERNAL MEDICINE

## 2023-05-23 PROCEDURE — 1159F PR MEDICATION LIST DOCUMENTED IN MEDICAL RECORD: ICD-10-PCS | Mod: CPTII,S$GLB,, | Performed by: INTERNAL MEDICINE

## 2023-05-23 PROCEDURE — 83036 HEMOGLOBIN GLYCOSYLATED A1C: CPT | Performed by: INTERNAL MEDICINE

## 2023-05-23 PROCEDURE — 3008F BODY MASS INDEX DOCD: CPT | Mod: CPTII,S$GLB,, | Performed by: INTERNAL MEDICINE

## 2023-05-23 PROCEDURE — 3074F SYST BP LT 130 MM HG: CPT | Mod: CPTII,S$GLB,, | Performed by: INTERNAL MEDICINE

## 2023-05-23 PROCEDURE — 99213 PR OFFICE/OUTPT VISIT, EST, LEVL III, 20-29 MIN: ICD-10-PCS | Mod: S$GLB,,, | Performed by: INTERNAL MEDICINE

## 2023-05-23 PROCEDURE — 3072F LOW RISK FOR RETINOPATHY: CPT | Mod: CPTII,S$GLB,, | Performed by: INTERNAL MEDICINE

## 2023-05-23 PROCEDURE — 3079F DIAST BP 80-89 MM HG: CPT | Mod: CPTII,S$GLB,, | Performed by: INTERNAL MEDICINE

## 2023-05-23 PROCEDURE — 1160F PR REVIEW ALL MEDS BY PRESCRIBER/CLIN PHARMACIST DOCUMENTED: ICD-10-PCS | Mod: CPTII,S$GLB,, | Performed by: INTERNAL MEDICINE

## 2023-05-23 PROCEDURE — 1160F RVW MEDS BY RX/DR IN RCRD: CPT | Mod: CPTII,S$GLB,, | Performed by: INTERNAL MEDICINE

## 2023-05-23 PROCEDURE — 3072F PR LOW RISK FOR RETINOPATHY: ICD-10-PCS | Mod: CPTII,S$GLB,, | Performed by: INTERNAL MEDICINE

## 2023-05-23 PROCEDURE — 82570 ASSAY OF URINE CREATININE: CPT | Performed by: INTERNAL MEDICINE

## 2023-05-23 PROCEDURE — 99213 OFFICE O/P EST LOW 20 MIN: CPT | Mod: S$GLB,,, | Performed by: INTERNAL MEDICINE

## 2023-05-23 PROCEDURE — 36415 COLL VENOUS BLD VENIPUNCTURE: CPT | Performed by: INTERNAL MEDICINE

## 2023-05-23 PROCEDURE — 87086 URINE CULTURE/COLONY COUNT: CPT | Performed by: INTERNAL MEDICINE

## 2023-05-23 PROCEDURE — 99999 PR PBB SHADOW E&M-EST. PATIENT-LVL IV: ICD-10-PCS | Mod: PBBFAC,,, | Performed by: INTERNAL MEDICINE

## 2023-05-23 PROCEDURE — 3074F PR MOST RECENT SYSTOLIC BLOOD PRESSURE < 130 MM HG: ICD-10-PCS | Mod: CPTII,S$GLB,, | Performed by: INTERNAL MEDICINE

## 2023-05-23 PROCEDURE — 3044F PR MOST RECENT HEMOGLOBIN A1C LEVEL <7.0%: ICD-10-PCS | Mod: CPTII,S$GLB,, | Performed by: INTERNAL MEDICINE

## 2023-05-23 PROCEDURE — 99999 PR PBB SHADOW E&M-EST. PATIENT-LVL IV: CPT | Mod: PBBFAC,,, | Performed by: INTERNAL MEDICINE

## 2023-05-23 PROCEDURE — 1159F MED LIST DOCD IN RCRD: CPT | Mod: CPTII,S$GLB,, | Performed by: INTERNAL MEDICINE

## 2023-05-23 PROCEDURE — 3079F PR MOST RECENT DIASTOLIC BLOOD PRESSURE 80-89 MM HG: ICD-10-PCS | Mod: CPTII,S$GLB,, | Performed by: INTERNAL MEDICINE

## 2023-05-23 PROCEDURE — 3044F HG A1C LEVEL LT 7.0%: CPT | Mod: CPTII,S$GLB,, | Performed by: INTERNAL MEDICINE

## 2023-05-23 NOTE — PROGRESS NOTES
Subjective:       Patient ID: Dina Martínez is a 52 y.o. female.    Chief Complaint:   Abdominal Pain (About three days )    HPI - 3 days of fever, aches, chills, and diarrhea.  Fever got as high as 103.  Most of that has resolved.  Now she has resolving diarrhea and lower abdominal discomfort.  She also has increasing urinary frequency and thinks she may have a UTI.  She was seen in urgent care 2 days ago and had a negative evaluation.  She is due for some diabetes labs.    Pmh/meds:  Reviewed and reconciled in EPIC with patient during visit today.    Review of Systems   Constitutional:  Positive for chills, fatigue and fever.   HENT:  Negative for congestion.    Respiratory:  Negative for shortness of breath.    Cardiovascular:  Negative for chest pain.   Gastrointestinal:  Positive for abdominal pain and diarrhea.   Genitourinary:  Positive for frequency. Negative for difficulty urinating.   Musculoskeletal:  Negative for arthralgias.   Skin:  Negative for rash.   Neurological:  Negative for headaches.   Psychiatric/Behavioral:  Negative for sleep disturbance.      Objective:      Physical Exam  Vitals reviewed.   Constitutional:       Appearance: She is well-developed. She is obese.   HENT:      Head: Normocephalic and atraumatic.   Cardiovascular:      Rate and Rhythm: Normal rate and regular rhythm.      Heart sounds: Normal heart sounds. No murmur heard.    No friction rub. No gallop.   Pulmonary:      Effort: Pulmonary effort is normal. No respiratory distress.      Breath sounds: Normal breath sounds. No wheezing or rales.   Chest:      Chest wall: No tenderness.   Abdominal:      General: There is no distension.      Palpations: There is no mass.      Tenderness: There is no abdominal tenderness. There is no right CVA tenderness or left CVA tenderness.   Skin:     General: Skin is warm and dry.      Findings: No erythema.   Neurological:      General: No focal deficit present.      Mental Status: She  is alert.   Psychiatric:         Mood and Affect: Mood normal.       Assessment:       1. Abdominal discomfort    2. Urinary frequency    3. New onset type 2 diabetes mellitus        Plan:       Dina Robbins was seen today for abdominal pain.    Diagnoses and all orders for this visit:    Abdominal discomfort - benign exam; negative workup at .  Perhaps this is cystitis or a resolving infectious diarrhea.  Will look for uti  -     Urinalysis  -     CULTURE, URINE    Urinary frequency  -     Urinalysis  -     CULTURE, URINE    New onset type 2 diabetes mellitus - due for a1c and microalbumin check.  Ordered.  -     Hemoglobin A1C; Future  -     Microalbumin/Creatinine Ratio, Urine    Rtc prn      LEO Solitario MD MPH  Staff Internist

## 2023-05-24 ENCOUNTER — PATIENT MESSAGE (OUTPATIENT)
Dept: INTERNAL MEDICINE | Facility: CLINIC | Age: 53
End: 2023-05-24
Payer: COMMERCIAL

## 2023-05-24 LAB — BACTERIA UR CULT: NO GROWTH

## 2023-05-28 ENCOUNTER — PATIENT MESSAGE (OUTPATIENT)
Dept: INTERNAL MEDICINE | Facility: CLINIC | Age: 53
End: 2023-05-28
Payer: COMMERCIAL

## 2023-05-29 ENCOUNTER — PATIENT MESSAGE (OUTPATIENT)
Dept: INTERNAL MEDICINE | Facility: CLINIC | Age: 53
End: 2023-05-29
Payer: COMMERCIAL

## 2023-05-30 ENCOUNTER — TELEPHONE (OUTPATIENT)
Dept: INTERNAL MEDICINE | Facility: CLINIC | Age: 53
End: 2023-05-30

## 2023-05-30 ENCOUNTER — LAB VISIT (OUTPATIENT)
Dept: LAB | Facility: HOSPITAL | Age: 53
End: 2023-05-30
Payer: COMMERCIAL

## 2023-05-30 ENCOUNTER — OFFICE VISIT (OUTPATIENT)
Dept: INTERNAL MEDICINE | Facility: CLINIC | Age: 53
End: 2023-05-30
Payer: COMMERCIAL

## 2023-05-30 VITALS
BODY MASS INDEX: 43.15 KG/M2 | HEART RATE: 80 BPM | WEIGHT: 274.94 LBS | DIASTOLIC BLOOD PRESSURE: 80 MMHG | SYSTOLIC BLOOD PRESSURE: 126 MMHG | TEMPERATURE: 98 F | OXYGEN SATURATION: 99 % | HEIGHT: 67 IN

## 2023-05-30 DIAGNOSIS — R10.9 LEFT SIDED ABDOMINAL PAIN: ICD-10-CM

## 2023-05-30 DIAGNOSIS — R19.7 DIARRHEA, UNSPECIFIED TYPE: Primary | ICD-10-CM

## 2023-05-30 DIAGNOSIS — R19.7 DIARRHEA, UNSPECIFIED TYPE: ICD-10-CM

## 2023-05-30 LAB
ALBUMIN SERPL BCP-MCNC: 3.7 G/DL (ref 3.5–5.2)
ALP SERPL-CCNC: 86 U/L (ref 55–135)
ALT SERPL W/O P-5'-P-CCNC: 24 U/L (ref 10–44)
ANION GAP SERPL CALC-SCNC: 9 MMOL/L (ref 8–16)
AST SERPL-CCNC: 19 U/L (ref 10–40)
BASOPHILS # BLD AUTO: 0.03 K/UL (ref 0–0.2)
BASOPHILS NFR BLD: 0.4 % (ref 0–1.9)
BILIRUB SERPL-MCNC: 0.2 MG/DL (ref 0.1–1)
BUN SERPL-MCNC: 12 MG/DL (ref 6–20)
CALCIUM SERPL-MCNC: 9.9 MG/DL (ref 8.7–10.5)
CHLORIDE SERPL-SCNC: 104 MMOL/L (ref 95–110)
CO2 SERPL-SCNC: 27 MMOL/L (ref 23–29)
CREAT SERPL-MCNC: 0.7 MG/DL (ref 0.5–1.4)
DIFFERENTIAL METHOD: ABNORMAL
EOSINOPHIL # BLD AUTO: 0.1 K/UL (ref 0–0.5)
EOSINOPHIL NFR BLD: 1.1 % (ref 0–8)
ERYTHROCYTE [DISTWIDTH] IN BLOOD BY AUTOMATED COUNT: 14.2 % (ref 11.5–14.5)
EST. GFR  (NO RACE VARIABLE): >60 ML/MIN/1.73 M^2
GLUCOSE SERPL-MCNC: 78 MG/DL (ref 70–110)
HCT VFR BLD AUTO: 40.5 % (ref 37–48.5)
HGB BLD-MCNC: 12.7 G/DL (ref 12–16)
IMM GRANULOCYTES # BLD AUTO: 0.03 K/UL (ref 0–0.04)
IMM GRANULOCYTES NFR BLD AUTO: 0.4 % (ref 0–0.5)
LIPASE SERPL-CCNC: 28 U/L (ref 4–60)
LYMPHOCYTES # BLD AUTO: 3.6 K/UL (ref 1–4.8)
LYMPHOCYTES NFR BLD: 43.9 % (ref 18–48)
MCH RBC QN AUTO: 26.5 PG (ref 27–31)
MCHC RBC AUTO-ENTMCNC: 31.4 G/DL (ref 32–36)
MCV RBC AUTO: 84 FL (ref 82–98)
MONOCYTES # BLD AUTO: 0.8 K/UL (ref 0.3–1)
MONOCYTES NFR BLD: 9.3 % (ref 4–15)
NEUTROPHILS # BLD AUTO: 3.7 K/UL (ref 1.8–7.7)
NEUTROPHILS NFR BLD: 44.9 % (ref 38–73)
NRBC BLD-RTO: 0 /100 WBC
PLATELET # BLD AUTO: 331 K/UL (ref 150–450)
PMV BLD AUTO: 10.1 FL (ref 9.2–12.9)
POTASSIUM SERPL-SCNC: 4.1 MMOL/L (ref 3.5–5.1)
PROT SERPL-MCNC: 7.7 G/DL (ref 6–8.4)
RBC # BLD AUTO: 4.8 M/UL (ref 4–5.4)
SODIUM SERPL-SCNC: 140 MMOL/L (ref 136–145)
WBC # BLD AUTO: 8.29 K/UL (ref 3.9–12.7)

## 2023-05-30 PROCEDURE — 3074F SYST BP LT 130 MM HG: CPT | Mod: CPTII,S$GLB,, | Performed by: PHYSICIAN ASSISTANT

## 2023-05-30 PROCEDURE — 85025 COMPLETE CBC W/AUTO DIFF WBC: CPT | Performed by: PHYSICIAN ASSISTANT

## 2023-05-30 PROCEDURE — 99999 PR PBB SHADOW E&M-EST. PATIENT-LVL V: CPT | Mod: PBBFAC,,, | Performed by: PHYSICIAN ASSISTANT

## 2023-05-30 PROCEDURE — 3044F PR MOST RECENT HEMOGLOBIN A1C LEVEL <7.0%: ICD-10-PCS | Mod: CPTII,S$GLB,, | Performed by: PHYSICIAN ASSISTANT

## 2023-05-30 PROCEDURE — 99214 OFFICE O/P EST MOD 30 MIN: CPT | Mod: S$GLB,,, | Performed by: PHYSICIAN ASSISTANT

## 2023-05-30 PROCEDURE — 3008F BODY MASS INDEX DOCD: CPT | Mod: CPTII,S$GLB,, | Performed by: PHYSICIAN ASSISTANT

## 2023-05-30 PROCEDURE — 99214 PR OFFICE/OUTPT VISIT, EST, LEVL IV, 30-39 MIN: ICD-10-PCS | Mod: S$GLB,,, | Performed by: PHYSICIAN ASSISTANT

## 2023-05-30 PROCEDURE — 3061F PR NEG MICROALBUMINURIA RESULT DOCUMENTED/REVIEW: ICD-10-PCS | Mod: CPTII,S$GLB,, | Performed by: PHYSICIAN ASSISTANT

## 2023-05-30 PROCEDURE — 1160F RVW MEDS BY RX/DR IN RCRD: CPT | Mod: CPTII,S$GLB,, | Performed by: PHYSICIAN ASSISTANT

## 2023-05-30 PROCEDURE — 3061F NEG MICROALBUMINURIA REV: CPT | Mod: CPTII,S$GLB,, | Performed by: PHYSICIAN ASSISTANT

## 2023-05-30 PROCEDURE — 99999 PR PBB SHADOW E&M-EST. PATIENT-LVL V: ICD-10-PCS | Mod: PBBFAC,,, | Performed by: PHYSICIAN ASSISTANT

## 2023-05-30 PROCEDURE — 1160F PR REVIEW ALL MEDS BY PRESCRIBER/CLIN PHARMACIST DOCUMENTED: ICD-10-PCS | Mod: CPTII,S$GLB,, | Performed by: PHYSICIAN ASSISTANT

## 2023-05-30 PROCEDURE — 3074F PR MOST RECENT SYSTOLIC BLOOD PRESSURE < 130 MM HG: ICD-10-PCS | Mod: CPTII,S$GLB,, | Performed by: PHYSICIAN ASSISTANT

## 2023-05-30 PROCEDURE — 3066F NEPHROPATHY DOC TX: CPT | Mod: CPTII,S$GLB,, | Performed by: PHYSICIAN ASSISTANT

## 2023-05-30 PROCEDURE — 3066F PR DOCUMENTATION OF TREATMENT FOR NEPHROPATHY: ICD-10-PCS | Mod: CPTII,S$GLB,, | Performed by: PHYSICIAN ASSISTANT

## 2023-05-30 PROCEDURE — 3044F HG A1C LEVEL LT 7.0%: CPT | Mod: CPTII,S$GLB,, | Performed by: PHYSICIAN ASSISTANT

## 2023-05-30 PROCEDURE — 1159F MED LIST DOCD IN RCRD: CPT | Mod: CPTII,S$GLB,, | Performed by: PHYSICIAN ASSISTANT

## 2023-05-30 PROCEDURE — 36415 COLL VENOUS BLD VENIPUNCTURE: CPT | Performed by: PHYSICIAN ASSISTANT

## 2023-05-30 PROCEDURE — 3079F PR MOST RECENT DIASTOLIC BLOOD PRESSURE 80-89 MM HG: ICD-10-PCS | Mod: CPTII,S$GLB,, | Performed by: PHYSICIAN ASSISTANT

## 2023-05-30 PROCEDURE — 1159F PR MEDICATION LIST DOCUMENTED IN MEDICAL RECORD: ICD-10-PCS | Mod: CPTII,S$GLB,, | Performed by: PHYSICIAN ASSISTANT

## 2023-05-30 PROCEDURE — 3008F PR BODY MASS INDEX (BMI) DOCUMENTED: ICD-10-PCS | Mod: CPTII,S$GLB,, | Performed by: PHYSICIAN ASSISTANT

## 2023-05-30 PROCEDURE — 83690 ASSAY OF LIPASE: CPT | Performed by: PHYSICIAN ASSISTANT

## 2023-05-30 PROCEDURE — 3072F PR LOW RISK FOR RETINOPATHY: ICD-10-PCS | Mod: CPTII,S$GLB,, | Performed by: PHYSICIAN ASSISTANT

## 2023-05-30 PROCEDURE — 3072F LOW RISK FOR RETINOPATHY: CPT | Mod: CPTII,S$GLB,, | Performed by: PHYSICIAN ASSISTANT

## 2023-05-30 PROCEDURE — 3079F DIAST BP 80-89 MM HG: CPT | Mod: CPTII,S$GLB,, | Performed by: PHYSICIAN ASSISTANT

## 2023-05-30 PROCEDURE — 80053 COMPREHEN METABOLIC PANEL: CPT | Performed by: PHYSICIAN ASSISTANT

## 2023-05-30 NOTE — PATIENT INSTRUCTIONS
You are scheduled for a CT scan with contrast.   You will need to stop your metformin the day of the CT scan and for 2 days afterwards as well.  Then you will have lab work to assess your kidney function, and I will contact you regarding resuming your metformin after the lab work is complete.

## 2023-05-30 NOTE — PROGRESS NOTES
Subjective:       Patient ID: Dina Martínez is a 52 y.o. female.        Chief Complaint: Diarrhea    Dina Martínez is an established patient of Pamela Arnold MD here today for urgent care visit.    Diarrhea x 11 days.  Fever up to 103 initially, also nausea.  Fever was for 24 hours then resolved.  Covid negative.  Urine normal.  Taking pepto bismol but no help.  Watery diarrhea.  No blood or mucus.  Nausea comes/goes.  No fever.  Gets cramping in abdomen, especially left side.  Sometimes upper and sometimes lower.  Location not consistent.  No vomiting.      No urinary sx.      Appetite fine.  Drinking plenty of fluids.    Wt Readings from Last 4 Encounters:  05/30/23 : 124.7 kg (274 lb 14.6 oz)  05/23/23 : 124.1 kg (273 lb 9.5 oz)  03/29/23 : 127 kg (280 lb)  03/07/23 : 122.5 kg (270 lb)    No new meds.  No abx use.  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  No recent travel.  No suspect food intake.      Day before she ate at [employer], had meatloaf, mashed potatoes, chicken, steak, fish.    DM -   Metformin 500 mg 2 BID    Lab Results       Component                Value               Date                       HGBA1C                   6.2 (H)             05/23/2023                 HGBA1C                   6.3 (H)             01/30/2023                 HGBA1C                   6.3 (H)             08/02/2022              HTN -   Maxzide 37.5-25 mg daily  /80    GERD -   Protonix  Pepcid         Review of Systems   Constitutional:  Negative for chills, diaphoresis, fatigue and fever.   HENT:  Negative for congestion and sore throat.    Eyes:  Negative for visual disturbance.   Respiratory:  Negative for cough, chest tightness and shortness of breath.    Cardiovascular:  Negative for chest pain, palpitations and leg swelling.   Gastrointestinal:  Positive for abdominal pain, diarrhea and nausea. Negative for blood in stool, constipation and vomiting.   Genitourinary:  Negative  for dysuria, frequency, hematuria and urgency.   Musculoskeletal:  Negative for arthralgias and back pain.   Skin:  Negative for rash.   Neurological:  Negative for dizziness, syncope, weakness and headaches.   Psychiatric/Behavioral:  Negative for dysphoric mood and sleep disturbance. The patient is not nervous/anxious.      Objective:      Physical Exam  Vitals and nursing note reviewed.   Constitutional:       Appearance: Normal appearance. She is well-developed.   HENT:      Head: Normocephalic.      Right Ear: External ear normal.      Left Ear: External ear normal.   Eyes:      Pupils: Pupils are equal, round, and reactive to light.   Cardiovascular:      Rate and Rhythm: Normal rate and regular rhythm.      Heart sounds: Normal heart sounds. No murmur heard.    No friction rub. No gallop.   Pulmonary:      Effort: Pulmonary effort is normal. No respiratory distress.      Breath sounds: Normal breath sounds.   Abdominal:      Palpations: Abdomen is soft.      Tenderness: There is abdominal tenderness in the suprapubic area. There is no right CVA tenderness or left CVA tenderness.   Skin:     General: Skin is warm and dry.   Neurological:      Mental Status: She is alert.       Assessment:       1. Diarrhea, unspecified type    2. Left sided abdominal pain        Plan:       Dina Robbins was seen today for diarrhea.    Diagnoses and all orders for this visit:    Diarrhea, unspecified type  -     CBC Auto Differential; Future  -     Comprehensive Metabolic Panel; Future  -     Lipase; Future  -     CT Abdomen Pelvis With Contrast; Future  -     Giardia / Cryptosporidum, EIA; Future  -     Clostridium difficile EIA; Future  -     Stool culture; Future  -     Stool Exam-Ova,Cysts,Parasites; Future  -     WBC, Stool; Future  -     Basic Metabolic Panel; Future    Left sided abdominal pain  -     CBC Auto Differential; Future  -     Comprehensive Metabolic Panel; Future  -     Lipase; Future  -     CT Abdomen Pelvis  "With Contrast; Future  -     Basic Metabolic Panel; Future    Labs, CT, stool studies today  Metformin instructions given - repeat BMP on Friday   GI consult if not resolving    You are scheduled for a CT scan with contrast.   You will need to stop your metformin the day of the CT scan and for 2 days afterwards as well.  Then you will have lab work to assess your kidney function, and I will contact you regarding resuming your metformin after the lab work is complete.    Pt has been given instructions populated from patient instructions database and has verbalized understanding of the after visit summary and information contained wherein.    Follow up with a primary care provider. May go to ER for acute shortness of breath, lightheadedness, fever, or any other emergent complaints or changes in condition.    "This note will be shared with the patient"    Future Appointments   Date Time Provider Department Center   5/30/2023  4:00 PM [facility] CT1 [facility] CTSCAN [facility]   8/4/2023  8:20 AM LAB, [facility] LAB [facility]   8/7/2023 10:00 AM Pamela Arnold MD [facility]   9/20/2023  9:00 AM PRE-ADMIT, ENDO -[facility] ENDO4 [facility]                 "

## 2023-05-30 NOTE — TELEPHONE ENCOUNTER
Please call patient  Lab work will be on Friday for repeat - BMP only  Please schedule at location of her choice  May want [facility]

## 2023-05-31 ENCOUNTER — TELEPHONE (OUTPATIENT)
Dept: INTERNAL MEDICINE | Facility: CLINIC | Age: 53
End: 2023-05-31
Payer: COMMERCIAL

## 2023-05-31 ENCOUNTER — PATIENT MESSAGE (OUTPATIENT)
Dept: INTERNAL MEDICINE | Facility: CLINIC | Age: 53
End: 2023-05-31
Payer: COMMERCIAL

## 2023-05-31 DIAGNOSIS — R19.7 DIARRHEA, UNSPECIFIED TYPE: Primary | ICD-10-CM

## 2023-05-31 NOTE — TELEPHONE ENCOUNTER
----- Message from Monika Wolff PA-C sent at 5/31/2023  6:19 AM CDT -----  Please call patient regarding CT  No cause for diarrhea  Referring to GI - try [facility] location  F/u with Dr. Arnold regarding fatty liver - not cause of symptoms

## 2023-05-31 NOTE — TELEPHONE ENCOUNTER
Pt did not answer the phone I left a message on her recorder that I would be sending a portal message since she is currently active.

## 2023-06-06 NOTE — PROGRESS NOTES
[facility] Consultation Note    Reason for Consult:  The primary encounter diagnosis was Family history of colon cancer. A diagnosis of Diarrhea, unspecified type was also pertinent to this visit.    PCP: Pamela Arnold   [address] [facility] [place] [address]  REF MD: Monika Wolff PA-C  [address] [place] [address]    HPI:  This is a 52 y.o. female here for evaluation of diarrhea.  This began after eating at [employer]  Diarrhea stopped 4 days ago  Has been taking prilosec  Tried pepto but wasn't working at first        ROS:  Constitutional: No fevers, chills, No weight loss  ENT: No allergies  CV: No chest pain  Pulm: No cough, No shortness of breath  Ophtho: No vision changes  GI: see HPI  Derm: No rash  Heme: No lymphadenopathy, No bruising  MSK: No arthritis  : No dysuria, No hematuria  Endo: No hot or cold intolerance  Neuro: No syncope, No seizure  Psych: No anxiety, No depression    Medical History:  has a past medical history of Diabetes mellitus, Hypertension, and Obstructive sleep apnea syndrome, severe.    Surgical History:  has a past surgical history that includes Endometrial ablation; Esophagogastroduodenoscopy (N/A, 6/27/2018); Colonoscopy (N/A, 8/22/2018); and Total abdominal hysterectomy w/ bilateral salpingoophorectomy (11/12/2015).    Family History: family history includes Cirrhosis in her brother; Colon cancer (age of onset: 74) in her father; Hypertension in her mother..     Social History:  reports that she has never smoked. She has never been exposed to tobacco smoke. She has never used smokeless tobacco. She reports current alcohol use of about 1.0 standard drink per week. She reports that she does not use drugs.    Review of patient's allergies indicates:  No Known Allergies    Current Outpatient Medications   Medication Sig Dispense Refill    diclofenac (VOLTAREN) 75 MG EC tablet Take 1 tablet (75 mg total) by mouth 2 (two) times daily  "as needed (pain). 60 tablet 1    ergocalciferol (ERGOCALCIFEROL) 50,000 unit Cap Take 1 capsule (50,000 Units total) by mouth once a week. 12 capsule 4    famotidine (PEPCID) 40 MG tablet Take 1 tablet (40 mg total) by mouth once daily. 90 tablet 4    loratadine (CLARITIN) 10 mg tablet Take 1 tablet (10 mg total) by mouth daily as needed for Allergies.      metFORMIN (GLUCOPHAGE-XR) 500 MG ER 24hr tablet Take 4 tablets (2,000 mg total) by mouth after dinner. 360 tablet 4    ondansetron (ZOFRAN-ODT) 4 MG TbDL Dissolve 1 tablet (4 mg total) by mouth every 8 (eight) hours as needed. 30 tablet 2    pantoprazole (PROTONIX) 40 MG tablet Take 1 tablet (40 mg total) by mouth once daily. 90 tablet 3    triamterene-hydrochlorothiazide 37.5-25 mg (MAXZIDE-25) 37.5-25 mg per tablet Take 1 tablet by mouth once daily. 90 tablet 4    valACYclovir (VALTREX) 1000 MG tablet Take 2 tablets by mouth twice a day x 1 day for outbreak 30 tablet 0    dicyclomine (BENTYL) 10 MG capsule Take 1 capsule (10 mg total) by mouth before meals as needed (abdominal pain). 90 capsule 3    multivitamin capsule Take 1 capsule by mouth once daily.       No current facility-administered medications for this visit.         Objective Findings:    Vital Signs:  /76   Pulse 77   Ht 5' 6" (1.676 m)   Wt 123.9 kg (273 lb 2.4 oz)   LMP 10/25/2015   BMI 44.09 kg/m²   Body mass index is 44.09 kg/m².    Physical Exam:  General Appearance: Well appearing in no acute distress  Head:   Normocephalic, without obvious abnormality  Eyes:    No scleral icterus, EOMI  ENT: Neck supple, Lips, mucosa, and tongue normal; teeth and gums normal  Lungs: CTA bilaterally in anterior and posterior fields, no wheezes, no crackles.  Heart:  Regular rate and rhythm, S1, S2 normal, no murmurs heard  Abdomen: Soft, non tender, non distended with positive bowel sounds in all four quadrants. No hepatosplenomegaly, ascites, or mass  Extremities: 2+ pulses, no clubbing, cyanosis " or edema  Skin: No rash  Neurologic: CN II-XII intact      Labs:  Lab Results   Component Value Date    WBC 8.29 05/30/2023    HGB 12.7 05/30/2023    HCT 40.5 05/30/2023     05/30/2023    CHOL 191 01/30/2023    TRIG 96 01/30/2023    HDL 55 01/30/2023    ALT 24 05/30/2023    AST 19 05/30/2023     06/01/2023    K 3.9 06/01/2023     06/01/2023    CREATININE 0.8 06/01/2023    BUN 13 06/01/2023    CO2 27 06/01/2023    TSH 2.229 01/30/2023    INR 0.9 09/15/2008    HGBA1C 6.1 (H) 06/01/2023         Imaging:  CT - wnl    Endoscopy:      Assessment:  1. Family history of colon cancer    2. Diarrhea, unspecified type           Recommendations:  1. Due for colon this year, will schedule  2. Antispasmodic  3. Probiotic x 1 month    No follow-ups on file.      Order summary:  Orders Placed This Encounter    dicyclomine (BENTYL) 10 MG capsule         Thank you so much for allowing me to participate in the care of Dina Modi MD

## 2023-06-07 ENCOUNTER — OFFICE VISIT (OUTPATIENT)
Dept: GASTROENTEROLOGY | Facility: CLINIC | Age: 53
End: 2023-06-07
Payer: COMMERCIAL

## 2023-06-07 VITALS
BODY MASS INDEX: 43.9 KG/M2 | SYSTOLIC BLOOD PRESSURE: 125 MMHG | HEIGHT: 66 IN | HEART RATE: 77 BPM | DIASTOLIC BLOOD PRESSURE: 76 MMHG | WEIGHT: 273.13 LBS

## 2023-06-07 VITALS — HEIGHT: 67 IN | BODY MASS INDEX: 42.85 KG/M2 | WEIGHT: 273 LBS

## 2023-06-07 DIAGNOSIS — R19.7 DIARRHEA, UNSPECIFIED TYPE: ICD-10-CM

## 2023-06-07 DIAGNOSIS — Z80.0 FAMILY HX OF COLON CANCER: ICD-10-CM

## 2023-06-07 DIAGNOSIS — Z12.11 SCREENING FOR COLON CANCER: Primary | ICD-10-CM

## 2023-06-07 DIAGNOSIS — Z80.0 FAMILY HISTORY OF COLON CANCER: Primary | ICD-10-CM

## 2023-06-07 PROCEDURE — 3008F BODY MASS INDEX DOCD: CPT | Mod: CPTII,S$GLB,, | Performed by: INTERNAL MEDICINE

## 2023-06-07 PROCEDURE — 3066F NEPHROPATHY DOC TX: CPT | Mod: CPTII,S$GLB,, | Performed by: INTERNAL MEDICINE

## 2023-06-07 PROCEDURE — 99999 PR PBB SHADOW E&M-EST. PATIENT-LVL IV: ICD-10-PCS | Mod: PBBFAC,,, | Performed by: INTERNAL MEDICINE

## 2023-06-07 PROCEDURE — 3074F PR MOST RECENT SYSTOLIC BLOOD PRESSURE < 130 MM HG: ICD-10-PCS | Mod: CPTII,S$GLB,, | Performed by: INTERNAL MEDICINE

## 2023-06-07 PROCEDURE — 1159F PR MEDICATION LIST DOCUMENTED IN MEDICAL RECORD: ICD-10-PCS | Mod: CPTII,S$GLB,, | Performed by: INTERNAL MEDICINE

## 2023-06-07 PROCEDURE — 3061F PR NEG MICROALBUMINURIA RESULT DOCUMENTED/REVIEW: ICD-10-PCS | Mod: CPTII,S$GLB,, | Performed by: INTERNAL MEDICINE

## 2023-06-07 PROCEDURE — 3072F PR LOW RISK FOR RETINOPATHY: ICD-10-PCS | Mod: CPTII,S$GLB,, | Performed by: INTERNAL MEDICINE

## 2023-06-07 PROCEDURE — 3078F PR MOST RECENT DIASTOLIC BLOOD PRESSURE < 80 MM HG: ICD-10-PCS | Mod: CPTII,S$GLB,, | Performed by: INTERNAL MEDICINE

## 2023-06-07 PROCEDURE — 3078F DIAST BP <80 MM HG: CPT | Mod: CPTII,S$GLB,, | Performed by: INTERNAL MEDICINE

## 2023-06-07 PROCEDURE — 3044F HG A1C LEVEL LT 7.0%: CPT | Mod: CPTII,S$GLB,, | Performed by: INTERNAL MEDICINE

## 2023-06-07 PROCEDURE — 3072F LOW RISK FOR RETINOPATHY: CPT | Mod: CPTII,S$GLB,, | Performed by: INTERNAL MEDICINE

## 2023-06-07 PROCEDURE — 3061F NEG MICROALBUMINURIA REV: CPT | Mod: CPTII,S$GLB,, | Performed by: INTERNAL MEDICINE

## 2023-06-07 PROCEDURE — 3066F PR DOCUMENTATION OF TREATMENT FOR NEPHROPATHY: ICD-10-PCS | Mod: CPTII,S$GLB,, | Performed by: INTERNAL MEDICINE

## 2023-06-07 PROCEDURE — 3044F PR MOST RECENT HEMOGLOBIN A1C LEVEL <7.0%: ICD-10-PCS | Mod: CPTII,S$GLB,, | Performed by: INTERNAL MEDICINE

## 2023-06-07 PROCEDURE — 3074F SYST BP LT 130 MM HG: CPT | Mod: CPTII,S$GLB,, | Performed by: INTERNAL MEDICINE

## 2023-06-07 PROCEDURE — 99204 OFFICE O/P NEW MOD 45 MIN: CPT | Mod: S$GLB,,, | Performed by: INTERNAL MEDICINE

## 2023-06-07 PROCEDURE — 1159F MED LIST DOCD IN RCRD: CPT | Mod: CPTII,S$GLB,, | Performed by: INTERNAL MEDICINE

## 2023-06-07 PROCEDURE — 99204 PR OFFICE/OUTPT VISIT, NEW, LEVL IV, 45-59 MIN: ICD-10-PCS | Mod: S$GLB,,, | Performed by: INTERNAL MEDICINE

## 2023-06-07 PROCEDURE — 3008F PR BODY MASS INDEX (BMI) DOCUMENTED: ICD-10-PCS | Mod: CPTII,S$GLB,, | Performed by: INTERNAL MEDICINE

## 2023-06-07 PROCEDURE — 99999 PR PBB SHADOW E&M-EST. PATIENT-LVL IV: CPT | Mod: PBBFAC,,, | Performed by: INTERNAL MEDICINE

## 2023-06-07 RX ORDER — DICYCLOMINE HYDROCHLORIDE 10 MG/1
10 CAPSULE ORAL
Qty: 90 CAPSULE | Refills: 3 | Status: SHIPPED | OUTPATIENT
Start: 2023-06-07

## 2023-06-07 RX ORDER — SODIUM, POTASSIUM,MAG SULFATES 17.5-3.13G
1 SOLUTION, RECONSTITUTED, ORAL ORAL DAILY
Qty: 1 KIT | Refills: 0 | Status: SHIPPED | OUTPATIENT
Start: 2023-06-07 | End: 2023-06-10

## 2023-06-07 NOTE — TELEPHONE ENCOUNTER
"Arnie Modi MD  P [facility] Endoscopist Clinic Patients  Procedure: Colonoscopy     Diagnosis: Screening colonoscopy, family history colon cancer     Procedure Timin-12 weeks     *If within 4 weeks selected, please calvin as high priority*     *If greater than 12 weeks, please select "4-12 weeks" and delay sending until 2 months prior to requested date*     Provider: Any GI provider     Location: [address]     Additional Scheduling Information: No scheduling concerns     Prep Specifications:Normal prep     Have you attached a patient to this message: yes   "

## 2023-06-20 ENCOUNTER — TELEPHONE (OUTPATIENT)
Dept: ENDOSCOPY | Facility: HOSPITAL | Age: 53
End: 2023-06-20
Payer: COMMERCIAL

## 2023-07-06 ENCOUNTER — ANESTHESIA (OUTPATIENT)
Dept: ENDOSCOPY | Facility: HOSPITAL | Age: 53
End: 2023-07-06
Payer: COMMERCIAL

## 2023-07-06 ENCOUNTER — ANESTHESIA EVENT (OUTPATIENT)
Dept: ENDOSCOPY | Facility: HOSPITAL | Age: 53
End: 2023-07-06
Payer: COMMERCIAL

## 2023-07-06 ENCOUNTER — HOSPITAL ENCOUNTER (OUTPATIENT)
Facility: HOSPITAL | Age: 53
Discharge: HOME | End: 2023-07-06
Attending: INTERNAL MEDICINE | Admitting: INTERNAL MEDICINE
Payer: COMMERCIAL

## 2023-07-06 VITALS
OXYGEN SATURATION: 100 % | DIASTOLIC BLOOD PRESSURE: 58 MMHG | RESPIRATION RATE: 18 BRPM | SYSTOLIC BLOOD PRESSURE: 127 MMHG | WEIGHT: 276 LBS | HEART RATE: 70 BPM | BODY MASS INDEX: 44.36 KG/M2 | TEMPERATURE: 98 F | HEIGHT: 66 IN

## 2023-07-06 DIAGNOSIS — Z80.0 FAMILY HISTORY OF COLON CANCER: ICD-10-CM

## 2023-07-06 LAB — POCT GLUCOSE: 94 MG/DL (ref 70–110)

## 2023-07-06 PROCEDURE — 45385 PR COLONOSCOPY,REMV LESN,SNARE: ICD-10-PCS | Mod: 33,,, | Performed by: INTERNAL MEDICINE

## 2023-07-06 PROCEDURE — 63600175 PHARM REV CODE 636 W HCPCS: Performed by: NURSE ANESTHETIST, CERTIFIED REGISTERED

## 2023-07-06 PROCEDURE — 25000003 PHARM REV CODE 250: Performed by: NURSE ANESTHETIST, CERTIFIED REGISTERED

## 2023-07-06 PROCEDURE — E9220 PRA ENDO ANESTHESIA: HCPCS | Mod: 33,,, | Performed by: NURSE ANESTHETIST, CERTIFIED REGISTERED

## 2023-07-06 PROCEDURE — 88305 TISSUE EXAM BY PATHOLOGIST: ICD-10-PCS | Mod: 26,,, | Performed by: PATHOLOGY

## 2023-07-06 PROCEDURE — 45385 COLONOSCOPY W/LESION REMOVAL: CPT | Mod: PT | Performed by: INTERNAL MEDICINE

## 2023-07-06 PROCEDURE — 45380 COLONOSCOPY AND BIOPSY: CPT | Mod: PT,59 | Performed by: INTERNAL MEDICINE

## 2023-07-06 PROCEDURE — 45380 COLONOSCOPY AND BIOPSY: CPT | Mod: 33,59,, | Performed by: INTERNAL MEDICINE

## 2023-07-06 PROCEDURE — 45380 PR COLONOSCOPY,BIOPSY: ICD-10-PCS | Mod: 33,59,, | Performed by: INTERNAL MEDICINE

## 2023-07-06 PROCEDURE — 88305 TISSUE EXAM BY PATHOLOGIST: CPT | Mod: 26,,, | Performed by: PATHOLOGY

## 2023-07-06 PROCEDURE — 27201089 HC SNARE, DISP (ANY): Performed by: INTERNAL MEDICINE

## 2023-07-06 PROCEDURE — 45385 COLONOSCOPY W/LESION REMOVAL: CPT | Mod: 33,,, | Performed by: INTERNAL MEDICINE

## 2023-07-06 PROCEDURE — 37000009 HC ANESTHESIA EA ADD 15 MINS: Performed by: INTERNAL MEDICINE

## 2023-07-06 PROCEDURE — 37000008 HC ANESTHESIA 1ST 15 MINUTES: Performed by: INTERNAL MEDICINE

## 2023-07-06 PROCEDURE — E9220 PRA ENDO ANESTHESIA: ICD-10-PCS | Mod: 33,,, | Performed by: NURSE ANESTHETIST, CERTIFIED REGISTERED

## 2023-07-06 PROCEDURE — 27201012 HC FORCEPS, HOT/COLD, DISP: Performed by: INTERNAL MEDICINE

## 2023-07-06 PROCEDURE — 88305 TISSUE EXAM BY PATHOLOGIST: CPT | Performed by: PATHOLOGY

## 2023-07-06 RX ORDER — LIDOCAINE HYDROCHLORIDE 20 MG/ML
INJECTION INTRAVENOUS
Status: DISCONTINUED | OUTPATIENT
Start: 2023-07-06 | End: 2023-07-06

## 2023-07-06 RX ORDER — PROPOFOL 10 MG/ML
VIAL (ML) INTRAVENOUS
Status: DISCONTINUED | OUTPATIENT
Start: 2023-07-06 | End: 2023-07-06

## 2023-07-06 RX ORDER — SODIUM CHLORIDE 9 MG/ML
INJECTION, SOLUTION INTRAVENOUS CONTINUOUS
Status: DISCONTINUED | OUTPATIENT
Start: 2023-07-06 | End: 2023-07-06 | Stop reason: HOSPADM

## 2023-07-06 RX ORDER — PROPOFOL 10 MG/ML
VIAL (ML) INTRAVENOUS CONTINUOUS PRN
Status: DISCONTINUED | OUTPATIENT
Start: 2023-07-06 | End: 2023-07-06

## 2023-07-06 RX ADMIN — Medication 150 MCG/KG/MIN: at 02:07

## 2023-07-06 RX ADMIN — PROPOFOL 70 MG: 10 INJECTION, EMULSION INTRAVENOUS at 02:07

## 2023-07-06 RX ADMIN — LIDOCAINE HYDROCHLORIDE 30 MG: 20 INJECTION INTRAVENOUS at 02:07

## 2023-07-06 RX ADMIN — SODIUM CHLORIDE: 0.9 INJECTION, SOLUTION INTRAVENOUS at 01:07

## 2023-07-06 NOTE — ANESTHESIA PREPROCEDURE EVALUATION
07/06/2023  Dina Martínez is a 52 y.o., female with diabetes, HTN, BILL here for screening colonoscopy.    Past Medical History:   Diagnosis Date    Diabetes mellitus     Hypertension     Obstructive sleep apnea syndrome, severe      Lab Results   Component Value Date    WBC 8.29 05/30/2023    HGB 12.7 05/30/2023    HCT 40.5 05/30/2023    MCV 84 05/30/2023     05/30/2023             Pre-op Assessment    I have reviewed the Patient Summary Reports.     I have reviewed the Nursing Notes. I have reviewed the NPO Status.      Review of Systems  Anesthesia Hx:  No problems with previous Anesthesia    Social:  Non-Smoker    Cardiovascular:   Hypertension    Pulmonary:   Sleep Apnea    Hepatic/GI:   GERD    Endocrine:   Diabetes        Physical Exam  General: Well nourished, Cooperative, Alert and Oriented    Airway:  Mallampati: II   Mouth Opening: Normal  TM Distance: Normal  Neck ROM: Normal ROM    Dental:  Intact        Anesthesia Plan  Type of Anesthesia, risks & benefits discussed:    Anesthesia Type: Gen Natural Airway  Intra-op Monitoring Plan: Standard ASA Monitors  Post Op Pain Control Plan: multimodal analgesia  Induction:  IV  Informed Consent: Informed consent signed with the Patient and all parties understand the risks and agree with anesthesia plan.  All questions answered.   ASA Score: 2    Ready For Surgery From Anesthesia Perspective.     .

## 2023-07-06 NOTE — H&P
[facility] 4th [facility]  History & Physical    Subjective:      Chief Complaint/Reason for Admission:     Direct access screening colonoscopy for family history of CRC and personal history of colon polyp    Dina Martínez is a 52 y.o. female.    Past Medical History:   Diagnosis Date    Diabetes mellitus     Hypertension     Obstructive sleep apnea syndrome, severe      Past Surgical History:   Procedure Laterality Date    COLONOSCOPY N/A 8/22/2018    Procedure: COLONOSCOPY;  Surgeon: Bianca Cooney MD;  Location: [facility];  Service: Endoscopy;  Laterality: N/A;    ENDOMETRIAL ABLATION      ESOPHAGOGASTRODUODENOSCOPY N/A 6/27/2018    Procedure: ESOPHAGOGASTRODUODENOSCOPY (EGD);  Surgeon: Bianca Cooney MD;  Location: [facility];  Service: Endoscopy;  Laterality: N/A;    TOTAL ABDOMINAL HYSTERECTOMY W/ BILATERAL SALPINGOOPHORECTOMY  11/12/2015     Family History   Problem Relation Age of Onset    Hypertension Mother     Colon cancer Father 74    Cirrhosis Brother     Diabetes Neg Hx     Heart disease Neg Hx     Melanoma Neg Hx      Social History     Tobacco Use    Smoking status: Never     Passive exposure: Never    Smokeless tobacco: Never   Substance Use Topics    Alcohol use: Yes     Alcohol/week: 1.0 standard drink     Types: 1 Cans of beer per week     Comment: 1-2 drinks a week    Drug use: No       PTA Medications   Medication Sig    diclofenac (VOLTAREN) 75 MG EC tablet Take 1 tablet (75 mg total) by mouth 2 (two) times daily as needed (pain).    dicyclomine (BENTYL) 10 MG capsule Take 1 capsule (10 mg total) by mouth 3 times daily before meals as needed (abdominal pain).    ergocalciferol (ERGOCALCIFEROL) 50,000 unit Cap Take 1 capsule (50,000 Units total) by mouth once a week.    famotidine (PEPCID) 40 MG tablet Take 1 tablet (40 mg total) by mouth once daily.    loratadine (CLARITIN) 10 mg tablet Take 1 tablet (10 mg total) by mouth daily as needed for Allergies.    metFORMIN  (GLUCOPHAGE-XR) 500 MG ER 24hr tablet Take 4 tablets (2,000 mg total) by mouth after dinner.    multivitamin capsule Take 1 capsule by mouth once daily.    ondansetron (ZOFRAN-ODT) 4 MG TbDL Dissolve 1 tablet (4 mg total) by mouth every 8 (eight) hours as needed.    pantoprazole (PROTONIX) 40 MG tablet Take 1 tablet (40 mg total) by mouth once daily.    triamterene-hydrochlorothiazide 37.5-25 mg (MAXZIDE-25) 37.5-25 mg per tablet Take 1 tablet by mouth once daily.    valACYclovir (VALTREX) 1000 MG tablet Take 2 tablets by mouth twice a day x 1 day for outbreak     Review of patient's allergies indicates:  No Known Allergies     Review of Systems   Constitutional:  Negative for fever.     Objective:      Vital Signs (Most Recent)       Vital Signs Range (Last 24H):       Physical Exam  Cardiovascular:      Rate and Rhythm: Normal rate.   Pulmonary:      Effort: Pulmonary effort is normal.   Neurological:      Mental Status: She is alert and oriented to person, place, and time.           Assessment:    Direct access screening colonoscopy for family history of CRC and personal history of colon polyp      Plan:    Direct access screening colonoscopy for family history of CRC and personal history of colon polyp

## 2023-07-06 NOTE — ANESTHESIA POSTPROCEDURE EVALUATION
Anesthesia Post Evaluation    Patient: Dina Martínez    Procedure(s) Performed: Procedure(s) (LRB):  COLONOSCOPY (N/A)    Final Anesthesia Type: general      Patient location during evaluation: GI PACU  Patient participation: Yes- Able to Participate  Level of consciousness: awake and alert  Post-procedure vital signs: reviewed and stable  Pain management: adequate  Airway patency: patent    PONV status at discharge: No PONV  Anesthetic complications: no      Cardiovascular status: blood pressure returned to baseline  Respiratory status: spontaneous ventilation  Hydration status: euvolemic  Follow-up not needed.          Vitals Value Taken Time   /58 07/06/23 1457   Temp 36.6 07/06/23 1540   Pulse 70 07/06/23 1457   Resp 18 07/06/23 1457   SpO2 100 % 07/06/23 1457         No case tracking events are documented in the log.      Pain/Barrett Score: Barrett Score: 10 (7/6/2023  2:58 PM)

## 2023-07-06 NOTE — PROVATION PATIENT INSTRUCTIONS
Discharge Summary/Instructions after an Endoscopic Procedure  Patient Name: Dina Martínez  Patient MRN: [id number]  Patient YOB: 1970 Thursday, July 6, 2023  Ramo Hutson MD  Dear patient,  As a result of recent federal legislation (The Federal Cures Act), you may   receive lab or pathology results from your procedure in your [facility]   account before your physician is able to contact you. Your physician or   their representative will relay the results to you with their   recommendations at their soonest availability.  Thank you,  RESTRICTIONS:  During your procedure today, you received medications for sedation.  These   medications may affect your judgment, balance and coordination.  Therefore,   for 24 hours, you have the following restrictions:   - DO NOT drive a car, operate machinery, make legal/financial decisions,   sign important papers or drink alcohol.    ACTIVITY:  Today: no heavy lifting, straining or running due to procedural   sedation/anesthesia.  The following day: return to full activity including work.  DIET:  Eat and drink normally unless instructed otherwise.     TREATMENT FOR COMMON SIDE EFFECTS:  - Mild abdominal pain, nausea, belching, bloating or excessive gas:  rest,   eat lightly and use a heating pad.  - Sore Throat: treat with throat lozenges and/or gargle with warm salt   water.  - Because air was used during the procedure, expelling large amounts of air   from your rectum or belching is normal.  - If a bowel prep was taken, you may not have a bowel movement for 1-3 days.    This is normal.  SYMPTOMS TO WATCH FOR AND REPORT TO YOUR PHYSICIAN:  1. Abdominal pain or bloating, other than gas cramps.  2. Chest pain.  3. Back pain.  4. Signs of infection such as: chills or fever occurring within 24 hours   after the procedure.  5. Rectal bleeding, which would show as bright red, maroon, or black stools.   (A tablespoon of blood from the rectum is not serious, especially  if   hemorrhoids are present.)  6. Vomiting.  7. Weakness or dizziness.  GO DIRECTLY TO THE NEAREST EMERGENCY ROOM IF YOU HAVE ANY OF THE FOLLOWING:      Difficulty breathing              Chills and/or fever over 101 F   Persistent vomiting and/or vomiting blood   Severe abdominal pain   Severe chest pain   Black, tarry stools   Bleeding- more than one tablespoon   Any other symptom or condition that you feel may need urgent attention  Your doctor recommends these additional instructions:  If any biopsies were taken, your doctors clinic will contact you in 1 to 2   weeks with any results.  - Discharge patient to home.   - Await pathology results.   - Telephone endoscopist for pathology results in 3 weeks.   - Return to referring physician.   - Repeat colonoscopy in 3 - 5 years for surveillance of multiple polyps.   - The findings and recommendations were discussed with the patient.  For questions, problems or results please call your physician - Ramo Hutson MD at Work:  [phone].  [facility], EMERGENCY ROOM PHONE NUMBER: [phone]  IF A COMPLICATION OR EMERGENCY SITUATION ARISES AND YOU ARE UNABLE TO REACH   YOUR PHYSICIAN - GO DIRECTLY TO THE EMERGENCY ROOM.  Ramo Hutson MD  7/6/2023 2:52:36 PM  This report has been verified and signed electronically.  Dear patient,  As a result of recent federal legislation (The Federal Cures Act), you may   receive lab or pathology results from your procedure in your [facility]   account before your physician is able to contact you. Your physician or   their representative will relay the results to you with their   recommendations at their soonest availability.  Thank you,  PROVATION

## 2023-07-06 NOTE — TRANSFER OF CARE
"Anesthesia Transfer of Care Note    Patient: Dina Martínez    Procedure(s) Performed: Procedure(s) (LRB):  COLONOSCOPY (N/A)    Patient location: PACU    Anesthesia Type: general    Transport from OR: Transported from OR on room air with adequate spontaneous ventilation    Post pain: adequate analgesia    Post assessment: no apparent anesthetic complications and tolerated procedure well    Post vital signs: stable    Level of consciousness: awake, alert and oriented    Nausea/Vomiting: no nausea/vomiting    Complications: none    Transfer of care protocol was followed      Last vitals:   Visit Vitals  BP (!) 137/66(BP Location: Left arm, Patient Position: Lying)   Pulse 81   Temp 36.7 °C (98 °F) (Temporal)   Resp 14   Ht 5' 6" (1.676 m)   Wt 125.2 kg (276 lb)   LMP 10/25/2015   SpO2 100%   Breastfeeding No   BMI 44.55 kg/m²     "

## 2023-07-12 LAB
FINAL PATHOLOGIC DIAGNOSIS: NORMAL
GROSS: NORMAL
Lab: NORMAL

## 2023-08-05 NOTE — PROGRESS NOTES
Dina your colonoscopy pathology was benign no evidence of Crohn's no evidence of ulcerative colitis no evidence of microscopic colitis no dysplasia.     1. Terminal ileum, biopsy:Small bowel mucosa with no diagnostic abnormality.Villous architecture is preserved with no intraepithelial lymphocytosis.     2. Rectum, polyps x4, polypectomies:   Hyperplastic polyps.     3. Colon, biopsy:   Colonic mucosa with preserved crypt architecture; no diagnostic abnormality.   Negative for microscopic colitis.   Comment: Interp By Rom Hughes M.D., Signed on 07/12/2023

## 2023-10-31 ENCOUNTER — HOSPITAL ENCOUNTER (EMERGENCY)
Facility: OTHER | Age: 53
Discharge: HOME | End: 2023-10-31
Attending: EMERGENCY MEDICINE
Payer: COMMERCIAL

## 2023-10-31 VITALS
OXYGEN SATURATION: 98 % | SYSTOLIC BLOOD PRESSURE: 163 MMHG | WEIGHT: 272 LBS | RESPIRATION RATE: 17 BRPM | TEMPERATURE: 98 F | DIASTOLIC BLOOD PRESSURE: 87 MMHG | BODY MASS INDEX: 43.9 KG/M2 | HEART RATE: 84 BPM

## 2023-10-31 DIAGNOSIS — R07.9 CHEST PAIN: ICD-10-CM

## 2023-10-31 DIAGNOSIS — K29.70 GASTRITIS, PRESENCE OF BLEEDING UNSPECIFIED, UNSPECIFIED CHRONICITY, UNSPECIFIED GASTRITIS TYPE: Primary | ICD-10-CM

## 2023-10-31 LAB
ALBUMIN SERPL BCP-MCNC: 4.3 G/DL (ref 3.5–5.2)
ALP SERPL-CCNC: 86 U/L (ref 55–135)
ALT SERPL W/O P-5'-P-CCNC: 19 U/L (ref 10–44)
ANION GAP SERPL CALC-SCNC: 15 MMOL/L (ref 8–16)
AST SERPL-CCNC: 19 U/L (ref 10–40)
BASOPHILS # BLD AUTO: 0.03 K/UL (ref 0–0.2)
BASOPHILS NFR BLD: 0.5 % (ref 0–1.9)
BILIRUB SERPL-MCNC: 0.3 MG/DL (ref 0.1–1)
BILIRUB UR QL STRIP: NEGATIVE
BNP SERPL-MCNC: <10 PG/ML (ref 0–99)
BUN SERPL-MCNC: 13 MG/DL (ref 6–20)
CALCIUM SERPL-MCNC: 10.1 MG/DL (ref 8.7–10.5)
CHLORIDE SERPL-SCNC: 100 MMOL/L (ref 95–110)
CLARITY UR: CLEAR
CO2 SERPL-SCNC: 22 MMOL/L (ref 23–29)
COLOR UR: YELLOW
CREAT SERPL-MCNC: 0.8 MG/DL (ref 0.5–1.4)
DIFFERENTIAL METHOD: ABNORMAL
EOSINOPHIL # BLD AUTO: 0 K/UL (ref 0–0.5)
EOSINOPHIL NFR BLD: 0.7 % (ref 0–8)
ERYTHROCYTE [DISTWIDTH] IN BLOOD BY AUTOMATED COUNT: 14.6 % (ref 11.5–14.5)
EST. GFR  (NO RACE VARIABLE): >60 ML/MIN/1.73 M^2
GLUCOSE SERPL-MCNC: 128 MG/DL (ref 70–110)
GLUCOSE UR QL STRIP: NEGATIVE
HCT VFR BLD AUTO: 42.3 % (ref 37–48.5)
HCV AB SERPL QL IA: NEGATIVE
HGB BLD-MCNC: 13.8 G/DL (ref 12–16)
HGB UR QL STRIP: NEGATIVE
HIV 1+2 AB+HIV1 P24 AG SERPL QL IA: NEGATIVE
IMM GRANULOCYTES # BLD AUTO: 0.02 K/UL (ref 0–0.04)
IMM GRANULOCYTES NFR BLD AUTO: 0.3 % (ref 0–0.5)
KETONES UR QL STRIP: NEGATIVE
LEUKOCYTE ESTERASE UR QL STRIP: NEGATIVE
LYMPHOCYTES # BLD AUTO: 1.9 K/UL (ref 1–4.8)
LYMPHOCYTES NFR BLD: 30.7 % (ref 18–48)
MCH RBC QN AUTO: 26.3 PG (ref 27–31)
MCHC RBC AUTO-ENTMCNC: 32.6 G/DL (ref 32–36)
MCV RBC AUTO: 81 FL (ref 82–98)
MONOCYTES # BLD AUTO: 0.6 K/UL (ref 0.3–1)
MONOCYTES NFR BLD: 9.3 % (ref 4–15)
NEUTROPHILS # BLD AUTO: 3.6 K/UL (ref 1.8–7.7)
NEUTROPHILS NFR BLD: 58.5 % (ref 38–73)
NITRITE UR QL STRIP: NEGATIVE
NRBC BLD-RTO: 0 /100 WBC
PH UR STRIP: 6 [PH] (ref 5–8)
PLATELET # BLD AUTO: 278 K/UL (ref 150–450)
PMV BLD AUTO: 9.6 FL (ref 9.2–12.9)
POTASSIUM SERPL-SCNC: 4 MMOL/L (ref 3.5–5.1)
PROT SERPL-MCNC: 8.4 G/DL (ref 6–8.4)
PROT UR QL STRIP: NEGATIVE
RBC # BLD AUTO: 5.24 M/UL (ref 4–5.4)
SODIUM SERPL-SCNC: 137 MMOL/L (ref 136–145)
SP GR UR STRIP: 1.01 (ref 1–1.03)
TROPONIN I SERPL DL<=0.01 NG/ML-MCNC: <0.006 NG/ML (ref 0–0.03)
URN SPEC COLLECT METH UR: NORMAL
UROBILINOGEN UR STRIP-ACNC: NEGATIVE EU/DL
WBC # BLD AUTO: 6.12 K/UL (ref 3.9–12.7)

## 2023-10-31 PROCEDURE — 84484 ASSAY OF TROPONIN QUANT: CPT | Performed by: EMERGENCY MEDICINE

## 2023-10-31 PROCEDURE — 63600175 PHARM REV CODE 636 W HCPCS: Performed by: EMERGENCY MEDICINE

## 2023-10-31 PROCEDURE — 25000003 PHARM REV CODE 250: Performed by: EMERGENCY MEDICINE

## 2023-10-31 PROCEDURE — 81003 URINALYSIS AUTO W/O SCOPE: CPT | Performed by: EMERGENCY MEDICINE

## 2023-10-31 PROCEDURE — 93005 ELECTROCARDIOGRAM TRACING: CPT

## 2023-10-31 PROCEDURE — 83880 ASSAY OF NATRIURETIC PEPTIDE: CPT | Performed by: EMERGENCY MEDICINE

## 2023-10-31 PROCEDURE — 87389 HIV-1 AG W/HIV-1&-2 AB AG IA: CPT | Performed by: EMERGENCY MEDICINE

## 2023-10-31 PROCEDURE — 93010 EKG 12-LEAD: ICD-10-PCS | Mod: ,,, | Performed by: INTERNAL MEDICINE

## 2023-10-31 PROCEDURE — 99285 EMERGENCY DEPT VISIT HI MDM: CPT | Mod: 25

## 2023-10-31 PROCEDURE — 80053 COMPREHEN METABOLIC PANEL: CPT | Performed by: EMERGENCY MEDICINE

## 2023-10-31 PROCEDURE — 86803 HEPATITIS C AB TEST: CPT | Performed by: EMERGENCY MEDICINE

## 2023-10-31 PROCEDURE — 85025 COMPLETE CBC W/AUTO DIFF WBC: CPT | Performed by: EMERGENCY MEDICINE

## 2023-10-31 PROCEDURE — 93010 ELECTROCARDIOGRAM REPORT: CPT | Mod: ,,, | Performed by: INTERNAL MEDICINE

## 2023-10-31 PROCEDURE — 96375 TX/PRO/DX INJ NEW DRUG ADDON: CPT

## 2023-10-31 PROCEDURE — 96374 THER/PROPH/DIAG INJ IV PUSH: CPT

## 2023-10-31 RX ORDER — ONDANSETRON 2 MG/ML
4 INJECTION INTRAMUSCULAR; INTRAVENOUS
Status: COMPLETED | OUTPATIENT
Start: 2023-10-31 | End: 2023-10-31

## 2023-10-31 RX ORDER — FAMOTIDINE 10 MG/ML
20 INJECTION INTRAVENOUS
Status: COMPLETED | OUTPATIENT
Start: 2023-10-31 | End: 2023-10-31

## 2023-10-31 RX ADMIN — ONDANSETRON 4 MG: 2 INJECTION INTRAMUSCULAR; INTRAVENOUS at 07:10

## 2023-10-31 RX ADMIN — FAMOTIDINE 20 MG: 10 INJECTION, SOLUTION INTRAVENOUS at 07:10

## 2023-10-31 NOTE — ED PROVIDER NOTES
"Encounter Date: 10/31/2023       History     Chief Complaint   Patient presents with    Chest Pain     Pt c/o palpitations, chest pressure, and N/V. First episode was at 1800. Multiple episodes of this prompted pt to come to ER. Denies cardiac hx.      Seen by physician at 7:35AM:    Patient is a 53-year-old female who presents to the emerge department with chest pressure, nausea/vomiting, and epigastric pain.  Patient states that yesterday she started having left upper quadrant and epigastric pain with associated nausea.  She went to bed but then woke up in the middle of the night, with palpitations, chest pressure, and nausea, which lasted about 5 minutes and then subsided, and she went back to sleep.  She had 2 more episodes similar to this throughout the night.  This morning, when she woke up she started having multiple episodes of vomiting and her chest pain has become more persistent though less severe than it was in the middle of the night.  Denies any diarrhea.  Denies any fevers/chills.  She denies any shortness of breath.  She denies any dysuria but has had some urinary urgency and frequency over the past month.  She has had epigastric pain similar to this in the past and has a history of a "bad stomach,"but denies any history of chest pains.  She does admit to eating a heavy and spicy meal yesterday (fried chicken).  She took a Bentyl that she subsequently vomited this morning.              Review of patient's allergies indicates:  No Known Allergies  Past Medical History:   Diagnosis Date    Diabetes mellitus     Hypertension     Obstructive sleep apnea syndrome, severe      Past Surgical History:   Procedure Laterality Date    COLONOSCOPY N/A 8/22/2018    Procedure: COLONOSCOPY;  Surgeon: Bianca Cooney MD;  Location: [facility];  Service: Endoscopy;  Laterality: N/A;    COLONOSCOPY N/A 7/6/2023    Procedure: COLONOSCOPY;  Surgeon: Ramo Hutson MD;  Location: [facility] ([address] [place] [address]);  Service: " Endoscopy;  Laterality: N/A;  prep instructions given to pt and sent to [place] [facility] -Jm  Suprep  time frame 4-12 weeks  pt diabetic/uses cpap  6/30 pre-call confirmed; MB    ENDOMETRIAL ABLATION      ESOPHAGOGASTRODUODENOSCOPY N/A 6/27/2018    Procedure: ESOPHAGOGASTRODUODENOSCOPY (EGD);  Surgeon: Bianca Cooney MD;  Location: [facility];  Service: Endoscopy;  Laterality: N/A;    TOTAL ABDOMINAL HYSTERECTOMY W/ BILATERAL SALPINGOOPHORECTOMY  11/12/2015     Family History   Problem Relation Age of Onset    Hypertension Mother     Colon cancer Father 74    Cirrhosis Brother     Diabetes Neg Hx     Heart disease Neg Hx     Melanoma Neg Hx      Social History     Tobacco Use    Smoking status: Never     Passive exposure: Never    Smokeless tobacco: Never   Substance Use Topics    Alcohol use: Yes     Alcohol/week: 1.0 standard drink of alcohol     Types: 1 Cans of beer per week     Comment: 1-2 drinks a week    Drug use: No     Review of Systems   Constitutional:  Negative for chills and fever.   HENT:  Negative for congestion and rhinorrhea.    Respiratory:  Positive for chest tightness. Negative for shortness of breath.    Cardiovascular:  Positive for palpitations. Negative for chest pain.   Gastrointestinal:  Positive for abdominal pain, nausea and vomiting. Negative for diarrhea.   Genitourinary:  Negative for dysuria and flank pain.   Musculoskeletal:  Negative for back pain and neck pain.   Skin:  Negative for color change and wound.   Neurological:  Negative for dizziness and headaches.       Physical Exam     Initial Vitals [10/31/23 0656]   BP Pulse Resp Temp SpO2   (!) 142/86 94 19 98.5 °F (36.9 °C) 97 %      MAP       --         Physical Exam    Nursing note and vitals reviewed.  Constitutional: She appears well-developed and well-nourished.   HENT:   Head: Normocephalic and atraumatic.   Eyes: Conjunctivae are normal.   Neck: Neck supple.   Normal range of motion.  Cardiovascular:  Normal rate, regular  rhythm and normal heart sounds.           Pulmonary/Chest: Breath sounds normal. No respiratory distress. She has no wheezes. She has no rales.   Abdominal: Abdomen is soft. Bowel sounds are normal. She exhibits no distension. There is abdominal tenderness.   Epigastric and left upper quadrant tenderness There is no rebound.   Musculoskeletal:         General: Normal range of motion.      Cervical back: Normal range of motion and neck supple.     Neurological: She is alert and oriented to person, place, and time.   Ambulatory with steady gait   Skin: Skin is warm and dry. Capillary refill takes less than 2 seconds.         ED Course   Procedures  Labs Reviewed   CBC W/ AUTO DIFFERENTIAL - Abnormal; Notable for the following components:       Result Value    MCV 81 (*)     MCH 26.3 (*)     RDW 14.6 (*)     All other components within normal limits   COMPREHENSIVE METABOLIC PANEL - Abnormal; Notable for the following components:    CO2 22 (*)     Glucose 128 (*)     All other components within normal limits   B-TYPE NATRIURETIC PEPTIDE   TROPONIN I   HIV 1 / 2 ANTIBODY    Narrative:     Release to patient->Immediate   HEPATITIS C ANTIBODY    Narrative:     Release to patient->Immediate   URINALYSIS     EKG Readings: (Independently Interpreted)   7:21PM:  Rate of 87.  Normal sinus rhythm.  Normal axis.  Normal intervals.  No ST or ischemic changes.       Imaging Results              X-Ray Chest PA And Lateral (Final result)  Result time 10/31/23 08:27:28      Final result by Jose Rangel MD (10/31/23 08:27:28)                   Impression:      No acute abnormality.      Electronically signed by: Jose Rangel MD  Date:    10/31/2023  Time:    08:27               Narrative:    EXAMINATION:  XR CHEST PA AND LATERAL    CLINICAL HISTORY:  Chest Pain;    TECHNIQUE:  PA and lateral views of the chest were performed.    COMPARISON:  Chest radiograph from 02/02/2022    FINDINGS:  The lungs are clear, with normal  appearance of pulmonary vasculature and no pleural effusion or pneumothorax.    The cardiac silhouette is normal in size. The hilar and mediastinal contours are unremarkable.    Bones are intact.                                    X-Rays:   Independently Interpreted Readings:   Chest X-Ray: Trachea midline.  No cardiomegaly.  No effusion, infiltrate, edema     Medications   ondansetron injection 4 mg (4 mg Intravenous Given 10/31/23 0758)   famotidine (PF) injection 20 mg (20 mg Intravenous Given 10/31/23 0758)     Medical Decision Making  7:35AM:  Patient is a 53-year-old female who presents to the emergency department with chest pain, epigastric pain, and nausea/vomiting.  Patient appears well, nontoxic.  She does have some tenderness on exam.  She does seem to have a GI component to her symptoms though she does have significant cardiac risk factors including diabetes and high blood pressure. Will plan for labs, cardiac eval, will continue to follow and reassess.    Amount and/or Complexity of Data Reviewed  External Data Reviewed: notes.  Labs: ordered. Decision-making details documented in ED Course.  Radiology: ordered and independent interpretation performed. Decision-making details documented in ED Course.  ECG/medicine tests: ordered and independent interpretation performed. Decision-making details documented in ED Course.    Risk  Prescription drug management.    11:28 AM:   Patient doing well, she feels much better.  She has not had any further episodes of vomiting here.  Her labs otherwise unremarkable including her cardiac workup.  I have a low suspicion that her pain is cardiac in nature and I suspect it likely has a GI etiology.  She is supposed to be taking omeprazole which she has not been taking recently.  I did discuss with her about restarting the omeprazole.  Given her reassuring workup, I do not feel that further work up in the ED is indicated at this time.  I updated pt regarding results and I  counseled pt regarding supportive care measures.  I have discussed with the pt ED return warnings and need for close PCP f/u.  Pt agreeable to plan and all questions answered.  I feel that pt is stable for discharge and management as an outpatient and no further intervention is needed at this time.  Pt is comfortable returning to the ED if needed.  Will DC home in stable condition.                             Clinical Impression:   Final diagnoses:  [R07.9] Chest pain  [K29.70] Gastritis, presence of bleeding unspecified, unspecified chronicity, unspecified gastritis type (Primary)        ED Disposition Condition    Discharge Stable          ED Prescriptions    None       Follow-up Information       Follow up With Specialties Details Why Contact Info    Pamela Arnold MD Internal Medicine   [address]  [place] [id number] [phone]               Nahomi Foss MD  10/31/23 6747

## 2023-10-31 NOTE — ED TRIAGE NOTES
Patient presents to ED with c/o epigastric and mid sternal chest pain, palpitations, N/V since last night. Describes the pain as an aching, fullness type pain. Reports no relief with home bentyl.

## 2023-11-02 ENCOUNTER — TELEPHONE (OUTPATIENT)
Dept: ENDOSCOPY | Facility: HOSPITAL | Age: 53
End: 2023-11-02

## 2023-11-02 ENCOUNTER — CLINICAL SUPPORT (OUTPATIENT)
Dept: ENDOSCOPY | Facility: HOSPITAL | Age: 53
End: 2023-11-02
Attending: INTERNAL MEDICINE
Payer: COMMERCIAL

## 2023-11-02 DIAGNOSIS — Z84.89 FAMILY HISTORY OF BENIGN NEOPLASM OF COLON: ICD-10-CM

## 2023-11-02 NOTE — TELEPHONE ENCOUNTER
Contacted patient in regards to colonoscopy and patient already had colonoscopy earlier this year.

## 2023-11-07 ENCOUNTER — PATIENT MESSAGE (OUTPATIENT)
Dept: INTERNAL MEDICINE | Facility: CLINIC | Age: 53
End: 2023-11-07
Payer: COMMERCIAL

## 2023-11-13 ENCOUNTER — PATIENT MESSAGE (OUTPATIENT)
Dept: ADMINISTRATIVE | Facility: HOSPITAL | Age: 53
End: 2023-11-13
Payer: COMMERCIAL

## 2023-11-25 ENCOUNTER — OFFICE VISIT (OUTPATIENT)
Dept: INTERNAL MEDICINE | Facility: CLINIC | Age: 53
End: 2023-11-25
Payer: COMMERCIAL

## 2023-11-25 ENCOUNTER — HOSPITAL ENCOUNTER (OUTPATIENT)
Dept: RADIOLOGY | Facility: HOSPITAL | Age: 53
Discharge: HOME | End: 2023-11-25
Attending: INTERNAL MEDICINE
Payer: COMMERCIAL

## 2023-11-25 VITALS
HEIGHT: 66 IN | OXYGEN SATURATION: 96 % | HEART RATE: 83 BPM | BODY MASS INDEX: 43.44 KG/M2 | DIASTOLIC BLOOD PRESSURE: 80 MMHG | SYSTOLIC BLOOD PRESSURE: 120 MMHG | WEIGHT: 270.31 LBS

## 2023-11-25 DIAGNOSIS — M25.569 KNEE PAIN, UNSPECIFIED CHRONICITY, UNSPECIFIED LATERALITY: ICD-10-CM

## 2023-11-25 DIAGNOSIS — R11.0 NAUSEA: ICD-10-CM

## 2023-11-25 DIAGNOSIS — Z23 NEED FOR VACCINATION AGAINST STREPTOCOCCUS PNEUMONIAE: ICD-10-CM

## 2023-11-25 DIAGNOSIS — E53.8 VITAMIN B12 DEFICIENCY: ICD-10-CM

## 2023-11-25 DIAGNOSIS — Z00.00 ROUTINE GENERAL MEDICAL EXAMINATION AT A HEALTH CARE FACILITY: Primary | ICD-10-CM

## 2023-11-25 DIAGNOSIS — E11.9 TYPE 2 DIABETES MELLITUS WITHOUT COMPLICATION, WITHOUT LONG-TERM CURRENT USE OF INSULIN: ICD-10-CM

## 2023-11-25 DIAGNOSIS — E55.9 VITAMIN D DEFICIENCY DISEASE: ICD-10-CM

## 2023-11-25 PROCEDURE — 3044F HG A1C LEVEL LT 7.0%: CPT | Mod: CPTII,S$GLB,, | Performed by: INTERNAL MEDICINE

## 2023-11-25 PROCEDURE — 1159F MED LIST DOCD IN RCRD: CPT | Mod: CPTII,S$GLB,, | Performed by: INTERNAL MEDICINE

## 2023-11-25 PROCEDURE — 90677 PNEUMOCOCCAL CONJUGATE VACCINE 20-VALENT: ICD-10-PCS | Mod: S$GLB,,, | Performed by: INTERNAL MEDICINE

## 2023-11-25 PROCEDURE — 3072F LOW RISK FOR RETINOPATHY: CPT | Mod: CPTII,S$GLB,, | Performed by: INTERNAL MEDICINE

## 2023-11-25 PROCEDURE — 90471 IMMUNIZATION ADMIN: CPT | Mod: S$GLB,,, | Performed by: INTERNAL MEDICINE

## 2023-11-25 PROCEDURE — 3079F PR MOST RECENT DIASTOLIC BLOOD PRESSURE 80-89 MM HG: ICD-10-PCS | Mod: CPTII,S$GLB,, | Performed by: INTERNAL MEDICINE

## 2023-11-25 PROCEDURE — 73562 X-RAY EXAM OF KNEE 3: CPT | Mod: 26,50,, | Performed by: INTERNAL MEDICINE

## 2023-11-25 PROCEDURE — 99999 PR PBB SHADOW E&M-EST. PATIENT-LVL V: ICD-10-PCS | Mod: PBBFAC,,, | Performed by: INTERNAL MEDICINE

## 2023-11-25 PROCEDURE — 3008F PR BODY MASS INDEX (BMI) DOCUMENTED: ICD-10-PCS | Mod: CPTII,S$GLB,, | Performed by: INTERNAL MEDICINE

## 2023-11-25 PROCEDURE — 3066F NEPHROPATHY DOC TX: CPT | Mod: CPTII,S$GLB,, | Performed by: INTERNAL MEDICINE

## 2023-11-25 PROCEDURE — 3066F PR DOCUMENTATION OF TREATMENT FOR NEPHROPATHY: ICD-10-PCS | Mod: CPTII,S$GLB,, | Performed by: INTERNAL MEDICINE

## 2023-11-25 PROCEDURE — 73562 X-RAY EXAM OF KNEE 3: CPT | Mod: TC,50

## 2023-11-25 PROCEDURE — 3044F PR MOST RECENT HEMOGLOBIN A1C LEVEL <7.0%: ICD-10-PCS | Mod: CPTII,S$GLB,, | Performed by: INTERNAL MEDICINE

## 2023-11-25 PROCEDURE — 99396 PR PREVENTIVE VISIT,EST,40-64: ICD-10-PCS | Mod: 25,S$GLB,, | Performed by: INTERNAL MEDICINE

## 2023-11-25 PROCEDURE — 3074F SYST BP LT 130 MM HG: CPT | Mod: CPTII,S$GLB,, | Performed by: INTERNAL MEDICINE

## 2023-11-25 PROCEDURE — 3074F PR MOST RECENT SYSTOLIC BLOOD PRESSURE < 130 MM HG: ICD-10-PCS | Mod: CPTII,S$GLB,, | Performed by: INTERNAL MEDICINE

## 2023-11-25 PROCEDURE — 3072F PR LOW RISK FOR RETINOPATHY: ICD-10-PCS | Mod: CPTII,S$GLB,, | Performed by: INTERNAL MEDICINE

## 2023-11-25 PROCEDURE — 99396 PREV VISIT EST AGE 40-64: CPT | Mod: 25,S$GLB,, | Performed by: INTERNAL MEDICINE

## 2023-11-25 PROCEDURE — 3079F DIAST BP 80-89 MM HG: CPT | Mod: CPTII,S$GLB,, | Performed by: INTERNAL MEDICINE

## 2023-11-25 PROCEDURE — 3008F BODY MASS INDEX DOCD: CPT | Mod: CPTII,S$GLB,, | Performed by: INTERNAL MEDICINE

## 2023-11-25 PROCEDURE — 90677 PCV20 VACCINE IM: CPT | Mod: S$GLB,,, | Performed by: INTERNAL MEDICINE

## 2023-11-25 PROCEDURE — 99999 PR PBB SHADOW E&M-EST. PATIENT-LVL V: CPT | Mod: PBBFAC,,, | Performed by: INTERNAL MEDICINE

## 2023-11-25 PROCEDURE — 90471 PNEUMOCOCCAL CONJUGATE VACCINE 20-VALENT: ICD-10-PCS | Mod: S$GLB,,, | Performed by: INTERNAL MEDICINE

## 2023-11-25 PROCEDURE — 73562 XR KNEE ORTHO BILAT: ICD-10-PCS | Mod: 26,50,, | Performed by: INTERNAL MEDICINE

## 2023-11-25 PROCEDURE — 3061F PR NEG MICROALBUMINURIA RESULT DOCUMENTED/REVIEW: ICD-10-PCS | Mod: CPTII,S$GLB,, | Performed by: INTERNAL MEDICINE

## 2023-11-25 PROCEDURE — 1159F PR MEDICATION LIST DOCUMENTED IN MEDICAL RECORD: ICD-10-PCS | Mod: CPTII,S$GLB,, | Performed by: INTERNAL MEDICINE

## 2023-11-25 PROCEDURE — 3061F NEG MICROALBUMINURIA REV: CPT | Mod: CPTII,S$GLB,, | Performed by: INTERNAL MEDICINE

## 2023-11-25 RX ORDER — SEMAGLUTIDE 0.68 MG/ML
INJECTION, SOLUTION SUBCUTANEOUS
Qty: 3 ML | Refills: 12 | Status: SHIPPED | OUTPATIENT
Start: 2023-11-25

## 2023-11-25 RX ORDER — PNEUMOCOCCAL 20-VALENT CONJUGATE VACCINE 2.2; 2.2; 2.2; 2.2; 2.2; 2.2; 2.2; 2.2; 2.2; 2.2; 2.2; 2.2; 2.2; 2.2; 2.2; 2.2; 4.4; 2.2; 2.2; 2.2 UG/.5ML; UG/.5ML; UG/.5ML; UG/.5ML; UG/.5ML; UG/.5ML; UG/.5ML; UG/.5ML; UG/.5ML; UG/.5ML; UG/.5ML; UG/.5ML; UG/.5ML; UG/.5ML; UG/.5ML; UG/.5ML; UG/.5ML; UG/.5ML; UG/.5ML; UG/.5ML
0.5 INJECTION, SUSPENSION INTRAMUSCULAR ONCE
Qty: 0.5 ML | Refills: 0 | Status: SHIPPED | OUTPATIENT
Start: 2023-11-25 | End: 2023-11-25

## 2023-11-25 RX ORDER — ONDANSETRON 4 MG/1
4 TABLET, ORALLY DISINTEGRATING ORAL EVERY 8 HOURS PRN
Qty: 30 TABLET | Refills: 2 | Status: SHIPPED | OUTPATIENT
Start: 2023-11-25

## 2023-11-25 RX ORDER — FAMOTIDINE 40 MG/1
40 TABLET, FILM COATED ORAL DAILY
Qty: 90 TABLET | Refills: 4 | Status: SHIPPED | OUTPATIENT
Start: 2023-11-25 | End: 2024-11-24

## 2023-11-25 NOTE — PATIENT INSTRUCTIONS
add ozempic 0.25 mg once weekly for 4 weeks then 0.5 mg once weeily.     Continue metformin  mg 2 daily

## 2023-11-25 NOTE — PROGRESS NOTES
"Chief Complaint: Annual exam and Follow up of multiple issues     HPI:THis is a 53 year old woman who presnets for above    She has some sinus congestion with white mucous.  NO fever or chills.      Urinary urgency is controlled with scheduled toileting.      She continues to  have abdominal issues. Seh will have some left upper quadrant pain sporadically.   She will have a BM and pain is better.   She has 4-5 soft stools when this occurs (about 3 times a week). She takes dicyclomine 10 mg twice daily which helps her abdominal sp asm.  Otherwise she has one BM daily.  She is taking pantoprazole 40 mg in am and famotidine 40 mg in the evening.      She continues to have right knee pain. She has trouble climbing steps.  The right knee keeps her awake at night. She has diclofenac (has not been takign due to stomach)    She has the correct shoes and feet are doing better. She is not taking gabapentin routinely She does her exercises at home. She saw Dr Triplett in orthoepdics for her foot pain. She had a MRI on 3/4/21."The MRI of the left hindfoot reveals significant tenosynovitis with fluid around the posterior tibial tendon and some mild tendinosis near the insertion of the posterior tibial tendon".  She was put in a boot and has weaned out of the boot.  Dr Triplett stopped her from working from March 1 to May 10, 2021.      She gets fever blisters once a year and valtrex 1000 2 tablets twice daily for only one day which works.  No fever blisters     She has sleep apnea. HEr sleep study 8/1/16 she has severe obstructive sleep apnea. She has a CPAP machine - she has been wearing the CPAP more than usual (2  nights a week) - wears 6-7 hours at night.  The CPAP machine helps. She is wearing the CPAP machine more     She is off Trulicity - gave her stomach problems which improved off trulicity.      She continues metformin  mg 2 tablets daily.     Her blood pressure is controlled on triamterene hct 37.5/25 one " tablet daily.  NO chest pain, shortness of breath, paplitations.      She is taking vitamin D 50,000 units once a week     Mood is doing ok. No anxiety or depression      She rides the bike for 30 minutes 3 times weekly at [employer].  She is working nights at [employer] for [employer].              Past Medical History:   Diagnosis Date    Diabetes mellitus      Hypertension      Obstructive sleep apnea syndrome, severe                        Past Surgical History:   Procedure Laterality Date    COLONOSCOPY N/A 8/22/2018     Procedure: COLONOSCOPY;  Surgeon: Bianca Cooney MD;  Location: [facility];  Service: Endoscopy;  Laterality: N/A;    ENDOMETRIAL ABLATION        ESOPHAGOGASTRODUODENOSCOPY N/A 6/27/2018     Procedure: ESOPHAGOGASTRODUODENOSCOPY (EGD);  Surgeon: Bianca Cooney MD;  Location: [facility];  Service: Endoscopy;  Laterality: N/A;    TOTAL ABDOMINAL HYSTERECTOMY W/ BILATERAL SALPINGOOPHORECTOMY   11/12/2015      Social History                   Socioeconomic History    Marital status:        Spouse name: Not on file    Number of children: Not on file    Years of education: Not on file    Highest education level: Not on file   Occupational History    Occupation: security   Social Needs    Financial resource strain: Not hard at all    Food insecurity       Worry: Never true       Inability: Never true    Transportation needs       Medical: No       Non-medical: No   Tobacco Use    Smoking status: Never Smoker    Smokeless tobacco: Never Used   Substance and Sexual Activity    Alcohol use: Yes       Alcohol/week: 1.0 standard drinks       Types: 1 Cans of beer per week       Frequency: Monthly or less       Drinks per session: 1 or 2       Binge frequency: Never       Comment: 1-2 drinks a week    Drug use: No    Sexual activity: Never       Birth control/protection: None   Lifestyle    Physical activity       Days per week: Not on file       Minutes per session: Not on file     "Stress: Not on file   Relationships    Social connections       Talks on phone: Once a week       Gets together: Never       Attends Confucianism service: Never       Active member of club or organization: No       Attends meetings of clubs or organizations: Never       Relationship status: Living with partner   Other Topics Concern    Are you pregnant or think you may be? Not Asked    Breast-feeding Not Asked   Social History Narrative    Not on file                   Family Status   Relation Name Status    Father       Mother   Alive    MGM       MGF       PGM       PGF       Brother   Alive    Neg Hx   (Not Specified)               Meds and allergies: updated on Epic     REVIEW OF SYSTEMS: No fevers, chills, night sweats, fatigue, visual change, hearing loss, sinus congestion, sore throat, chest pain, shortness of breath, nausea, vomiting, constipation, diarrhea, dysuria, hematuria, polydipsia, polyuria, other joint pain, muscle pain, headaches, anxiety, depression, insomnia.            Physical exam:   /78 (BP Location: Left arm, Patient Position: Sitting, BP Method: Large (Manual))   Pulse 83   Ht 5' 6" (1.676 m)   Wt 122.6 kg (270 lb 4.5 oz)   LMP 10/25/2015   SpO2 96%   BMI 43.62 kg/m²         General: alert, oriented x 3, no apparent distress.  Affect normal  HEENT: Conjunctivae: anicteric, PERRL, EOMI, TM clear fluid on left, normal on right, Oralpharynx clear  Neck: supple, no thyroid enlargement, no cervical lymphadenopathy  Resp: effort normal, lungs clear bilaterally  CV: Regular rate and rhythm without murmurs, gallops or rubs, no lower extremity edema,       Protective Sensation (w/ 10 gram monofilament):  Right: Intact  Left: Intact    Visual Inspection:  Normal -  Bilateral    Pedal Pulses:   Right: Present  Left: Present    Posterior Tibialis Pulses:   Right:Present  Left: Present       Labs 23 reviewed           Assessment/Plan:    Annual exam " - discussed diet, exercise and safety issues.    Knee pain - xray and to ortho.   Left Foot pain and ankle pain -improved  HTN - controlled  Diabetes add ozempic 0.25 mg once weekly for 4 weeks then 0.5 mg once weeily. Continue metformin  mg 2 daily  Vitamin D deficiency - on replacement.   Colonoscopy due 2018 - normal  - father  of colon cancer at age 74.  Colonoscopy 23 - 4 small polyps- hyperplastic - due   EGD normal 18  MMG 3/23     Follow up in 6 months, [place]er if issues  Answers submitted by the patient for this visit:  Review of Systems Questionnaire (Submitted on 2023)  activity change: No  unexpected weight change: No  neck pain: No  hearing loss: No  rhinorrhea: No  trouble swallowing: No  eye discharge: No  visual disturbance: No  chest tightness: No  wheezing: No  chest pain: No  palpitations: No  blood in stool: No  constipation: No  vomiting: No  diarrhea: No  polydipsia: No  polyuria: No  difficulty urinating: No  hematuria: No  menstrual problem: No  dysuria: No  joint swelling: No  arthralgias: No  headaches: No  weakness: No  confusion: No  dysphoric mood: No

## 2023-12-04 ENCOUNTER — OFFICE VISIT (OUTPATIENT)
Dept: ORTHOPEDICS | Facility: CLINIC | Age: 53
End: 2023-12-04
Payer: COMMERCIAL

## 2023-12-04 VITALS
SYSTOLIC BLOOD PRESSURE: 125 MMHG | BODY MASS INDEX: 43.39 KG/M2 | HEART RATE: 97 BPM | HEIGHT: 66 IN | WEIGHT: 270 LBS | DIASTOLIC BLOOD PRESSURE: 78 MMHG

## 2023-12-04 DIAGNOSIS — M25.569 KNEE PAIN, UNSPECIFIED CHRONICITY, UNSPECIFIED LATERALITY: ICD-10-CM

## 2023-12-04 DIAGNOSIS — M17.11 ARTHRITIS OF RIGHT KNEE: Primary | ICD-10-CM

## 2023-12-04 PROCEDURE — 3008F PR BODY MASS INDEX (BMI) DOCUMENTED: ICD-10-PCS | Mod: CPTII,S$GLB,, | Performed by: ORTHOPAEDIC SURGERY

## 2023-12-04 PROCEDURE — 3044F HG A1C LEVEL LT 7.0%: CPT | Mod: CPTII,S$GLB,, | Performed by: ORTHOPAEDIC SURGERY

## 2023-12-04 PROCEDURE — 3061F PR NEG MICROALBUMINURIA RESULT DOCUMENTED/REVIEW: ICD-10-PCS | Mod: CPTII,S$GLB,, | Performed by: ORTHOPAEDIC SURGERY

## 2023-12-04 PROCEDURE — 20610 DRAIN/INJ JOINT/BURSA W/O US: CPT | Mod: RT,S$GLB,, | Performed by: ORTHOPAEDIC SURGERY

## 2023-12-04 PROCEDURE — 3061F NEG MICROALBUMINURIA REV: CPT | Mod: CPTII,S$GLB,, | Performed by: ORTHOPAEDIC SURGERY

## 2023-12-04 PROCEDURE — 3078F PR MOST RECENT DIASTOLIC BLOOD PRESSURE < 80 MM HG: ICD-10-PCS | Mod: CPTII,S$GLB,, | Performed by: ORTHOPAEDIC SURGERY

## 2023-12-04 PROCEDURE — 99999 PR PBB SHADOW E&M-EST. PATIENT-LVL III: CPT | Mod: PBBFAC,,, | Performed by: ORTHOPAEDIC SURGERY

## 2023-12-04 PROCEDURE — 3008F BODY MASS INDEX DOCD: CPT | Mod: CPTII,S$GLB,, | Performed by: ORTHOPAEDIC SURGERY

## 2023-12-04 PROCEDURE — 3072F PR LOW RISK FOR RETINOPATHY: ICD-10-PCS | Mod: CPTII,S$GLB,, | Performed by: ORTHOPAEDIC SURGERY

## 2023-12-04 PROCEDURE — 3072F LOW RISK FOR RETINOPATHY: CPT | Mod: CPTII,S$GLB,, | Performed by: ORTHOPAEDIC SURGERY

## 2023-12-04 PROCEDURE — 99214 OFFICE O/P EST MOD 30 MIN: CPT | Mod: 25,S$GLB,, | Performed by: ORTHOPAEDIC SURGERY

## 2023-12-04 PROCEDURE — 99214 PR OFFICE/OUTPT VISIT, EST, LEVL IV, 30-39 MIN: ICD-10-PCS | Mod: 25,S$GLB,, | Performed by: ORTHOPAEDIC SURGERY

## 2023-12-04 PROCEDURE — 3074F PR MOST RECENT SYSTOLIC BLOOD PRESSURE < 130 MM HG: ICD-10-PCS | Mod: CPTII,S$GLB,, | Performed by: ORTHOPAEDIC SURGERY

## 2023-12-04 PROCEDURE — 1159F MED LIST DOCD IN RCRD: CPT | Mod: CPTII,S$GLB,, | Performed by: ORTHOPAEDIC SURGERY

## 2023-12-04 PROCEDURE — 3078F DIAST BP <80 MM HG: CPT | Mod: CPTII,S$GLB,, | Performed by: ORTHOPAEDIC SURGERY

## 2023-12-04 PROCEDURE — 1159F PR MEDICATION LIST DOCUMENTED IN MEDICAL RECORD: ICD-10-PCS | Mod: CPTII,S$GLB,, | Performed by: ORTHOPAEDIC SURGERY

## 2023-12-04 PROCEDURE — 20610 PR DRAIN/INJECT LARGE JOINT/BURSA: ICD-10-PCS | Mod: RT,S$GLB,, | Performed by: ORTHOPAEDIC SURGERY

## 2023-12-04 PROCEDURE — 3044F PR MOST RECENT HEMOGLOBIN A1C LEVEL <7.0%: ICD-10-PCS | Mod: CPTII,S$GLB,, | Performed by: ORTHOPAEDIC SURGERY

## 2023-12-04 PROCEDURE — 3066F NEPHROPATHY DOC TX: CPT | Mod: CPTII,S$GLB,, | Performed by: ORTHOPAEDIC SURGERY

## 2023-12-04 PROCEDURE — 99999 PR PBB SHADOW E&M-EST. PATIENT-LVL III: ICD-10-PCS | Mod: PBBFAC,,, | Performed by: ORTHOPAEDIC SURGERY

## 2023-12-04 PROCEDURE — 3074F SYST BP LT 130 MM HG: CPT | Mod: CPTII,S$GLB,, | Performed by: ORTHOPAEDIC SURGERY

## 2023-12-04 PROCEDURE — 3066F PR DOCUMENTATION OF TREATMENT FOR NEPHROPATHY: ICD-10-PCS | Mod: CPTII,S$GLB,, | Performed by: ORTHOPAEDIC SURGERY

## 2023-12-04 RX ORDER — INFLUENZA A VIRUS A/WEST VIRGINIA/30/2022 (H1N1) RECOMBINANT HEMAGGLUTININ ANTIGEN, INFLUENZA A VIRUS A/DARWIN/6/2021 (H3N2) RECOMBINANT HEMAGGLUTININ ANTIGEN, INFLUENZA B VIRUS B/AUSTRIA/1359417/2021 RECOMBINANT HEMAGGLUTININ ANTIGEN, AND INFLUENZA B VIRUS B/PHUKET/3073/2013 RECOMBINANT HEMAGGLUTININ ANTIGEN 45; 45; 45; 45 UG/.5ML; UG/.5ML; UG/.5ML; UG/.5ML
INJECTION INTRAMUSCULAR
COMMUNITY
Start: 2023-11-03

## 2023-12-04 RX ORDER — TRIAMCINOLONE ACETONIDE 40 MG/ML
40 INJECTION, SUSPENSION INTRA-ARTICULAR; INTRAMUSCULAR
Status: DISCONTINUED | OUTPATIENT
Start: 2023-12-04 | End: 2023-12-04 | Stop reason: HOSPADM

## 2023-12-04 RX ADMIN — TRIAMCINOLONE ACETONIDE 40 MG: 40 INJECTION, SUSPENSION INTRA-ARTICULAR; INTRAMUSCULAR at 09:12

## 2023-12-04 NOTE — PROGRESS NOTES
Patient ID: Dina Martínez is a 53 y.o. female    Chief Complaint:   Chief Complaint   Patient presents with    Left Knee - Pain    Right Knee - Pain     Right knee is worse.        History of Present Illness:    Pleasant 53-year-old female,  here at [facility], who presents for evaluation of right knee pain.  Most of her pain is medial.  This has been going on for several months.  The pain is worse with activity and prolonged sitting or standing.  Does report some swelling.  No significant mechanical symptoms.  Does take anti-inflammatories which helps a little bit.  No history of injections or physical therapy.    PAST MEDICAL HISTORY:   Past Medical History:   Diagnosis Date    Diabetes mellitus     Hypertension     Obstructive sleep apnea syndrome, severe      PAST SURGICAL HISTORY:   Past Surgical History:   Procedure Laterality Date    COLONOSCOPY N/A 8/22/2018    Procedure: COLONOSCOPY;  Surgeon: Bianca Cooney MD;  Location: [facility];  Service: Endoscopy;  Laterality: N/A;    COLONOSCOPY N/A 7/6/2023    Procedure: COLONOSCOPY;  Surgeon: Ramo Hutson MD;  Location: [facility] ([address] [place] [address]);  Service: Endoscopy;  Laterality: N/A;  prep instructions given to pt and sent to portal -Jm  Suprep  time frame 4-12 weeks  pt diabetic/uses cpap  6/30 pre-call confirmed; MB    ENDOMETRIAL ABLATION      ESOPHAGOGASTRODUODENOSCOPY N/A 6/27/2018    Procedure: ESOPHAGOGASTRODUODENOSCOPY (EGD);  Surgeon: Bianca Cooney MD;  Location: [facility];  Service: Endoscopy;  Laterality: N/A;    TOTAL ABDOMINAL HYSTERECTOMY W/ BILATERAL SALPINGOOPHORECTOMY  11/12/2015     FAMILY HISTORY:   Family History   Problem Relation Age of Onset    Hypertension Mother     Colon cancer Father 74    Cirrhosis Brother     Diabetes Neg Hx     Heart disease Neg Hx     Melanoma Neg Hx      SOCIAL HISTORY:   Social History     Occupational History    Occupation: security   Tobacco Use    Smoking status: Never     Passive  exposure: Never    Smokeless tobacco: Never   Substance and Sexual Activity    Alcohol use: Yes     Alcohol/week: 1.0 standard drink of alcohol     Types: 1 Cans of beer per week     Comment: 1-2 drinks a week    Drug use: No    Sexual activity: Never     Birth control/protection: None        MEDICATIONS:   Current Outpatient Medications:     diclofenac (VOLTAREN) 75 MG EC tablet, Take 1 tablet (75 mg total) by mouth 2 (two) times daily as needed (pain)., Disp: 60 tablet, Rfl: 1    dicyclomine (BENTYL) 10 MG capsule, Take 1 capsule (10 mg total) by mouth 3 times daily before meals as needed (abdominal pain)., Disp: 90 capsule, Rfl: 3    ergocalciferol (ERGOCALCIFEROL) 50,000 unit Cap, Take 1 capsule (50,000 Units total) by mouth once a week., Disp: 12 capsule, Rfl: 4    famotidine (PEPCID) 40 MG tablet, Take 1 tablet (40 mg total) by mouth once daily., Disp: 90 tablet, Rfl: 4    FLUBLOK QUAD 1605-2616, PF, 180 mcg (45 mcg x 4)/0.5 mL Syrg, , Disp: , Rfl:     loratadine (CLARITIN) 10 mg tablet, Take 1 tablet (10 mg total) by mouth daily as needed for Allergies., Disp: , Rfl:     metFORMIN (GLUCOPHAGE-XR) 500 MG ER 24hr tablet, Take 4 tablets (2,000 mg total) by mouth after dinner., Disp: 360 tablet, Rfl: 4    multivitamin capsule, Take 1 capsule by mouth once daily., Disp: , Rfl:     ondansetron (ZOFRAN-ODT) 4 MG TbDL, Dissolve 1 tablet (4 mg total) by mouth every 8 (eight) hours as needed., Disp: 30 tablet, Rfl: 2    pantoprazole (PROTONIX) 40 MG tablet, Take 1 tablet (40 mg total) by mouth once daily., Disp: 90 tablet, Rfl: 3    semaglutide (OZEMPIC) 0.25 mg or 0.5 mg (2 mg/3 mL) pen injector, 0.25 mg once weekly for 4 weeks then 0.5 mg once weekly, Disp: 3 mL, Rfl: 12    triamterene-hydrochlorothiazide 37.5-25 mg (MAXZIDE-25) 37.5-25 mg per tablet, Take 1 tablet by mouth once daily., Disp: 90 tablet, Rfl: 4    valACYclovir (VALTREX) 1000 MG tablet, Take 2 tablets by mouth twice a day x 1 day for outbreak, Disp: 30  tablet, Rfl: 0  ALLERGIES: Review of patient's allergies indicates:  No Known Allergies      Physical Exam     Vitals:    12/04/23 0954   BP: 125/78   Pulse: 97     Alert and oriented to person, place and time. No acute distress. Well-groomed, not ill appearing. Pupils round and reactive, normal respiratory effort, no audible wheezing.     GENERAL:  A well-developed, well-nourished 53 y.o. female who is alert and       oriented in no acute distress.      Gait: She  walks with a antalgic gait.                   EXTREMITIES:  Examination of lower extremities reveals there is no visible mass or deformity.        Right knee:  ROM 5-130    Ligamentously stable to varus/valgus stress.    Anterior and posterior drawers negative.    No pain over pes bursa.    No warmth    No erythema    Effusion Yes    medial joint line tenderness    Positive Patellofemoral grind/crepitus     The skin over both lower extremities is normal and unremarkable.  She has a  painless range of motion of the hips and ankles bilaterally.   Sensation is intact in both lower extremities.    There are no motor deficits in the lower extremities bilaterally.   Pedal pulses are palpable distally bilaterally.    She has no calf tenderness to palpation nor edema.       Imaging:       X-Ray: I have reviewed all pertinent results/findings and my personal findings are:  Moderate to severe right knee DJD mostly in the medial compartment and patellofemoral compartment with varus deformity      Assessment & Plan    Arthritis of right knee    Knee pain, unspecified chronicity, unspecified laterality  -     Ambulatory referral/consult to Orthopedics         I made the decision to obtain old records of the patient including previous notes and imaging. New imaging, if ordered today of the extremity or extremities, were evaluated. I independently reviewed and interpreted the radiographs and/or MRIs/CT scan today as well as prior imaging. I also reviewed the referring  provider's documentation as well as any pertinent labs, tests and imaging.     I believe that this is likely arthritis of the right knee    After clinical evaluation, we discussed at length different treatment options including anti-inflammatories, acetaminophen, rest, ice, heat, formal physical therapy including strengthening and stretching exercises, home exercise programs, injections and finally surgical intervention. We discussed the morbidity and mortality associated with surgery as well as the risks and expectations of surgery.  We also discussed weight loss and BMI and its effects on joints and overall health as well as smoking cessation if appropriate.    After shared medical decision-making with the patient and  family, the patient would like to proceed with a trial of:     Corticosteroid Injection right knee   Home Exercise program right knee     We can consider Visco injections if pain is refractory to conservative treatment    All questions were answered and patient is agreeable to the above plan.

## 2023-12-04 NOTE — PROCEDURES
Large Joint Aspiration/Injection: R knee joint    Date/Time: 12/4/2023 9:45 AM    Performed by: Itz Leung MD  Authorized by: Itz Leung MD    Consent Done?:  Yes (Verbal)  Indications:  Pain  Site marked: the procedure site was marked    Timeout: prior to procedure the correct patient, procedure, and site was verified    Local anesthetic:  Lidocaine 1% without epinephrine  Anesthetic total (ml):  3      Details:  Needle Size:  22 G  Approach:  Anterolateral  Location:  Knee  Site:  R knee joint  Medications:  40 mg triamcinolone acetonide 40 mg/mL  Patient tolerance:  Patient tolerated the procedure well with no immediate complications

## 2024-01-03 ENCOUNTER — PATIENT MESSAGE (OUTPATIENT)
Dept: INTERNAL MEDICINE | Facility: CLINIC | Age: 54
End: 2024-01-03
Payer: COMMERCIAL

## 2024-01-22 NOTE — ED NOTES
APPEARANCE: Alert, oriented and in no acute distress.  CARDIAC: BP elevated/reports did not take med this am    PERIPHERAL VASCULAR: peripheral pulses present./+swelling to the right knee     RESPIRATORY Respirations are equal and unlabored no obvious signs of distress.  MUSC: Limited ROM to the right knee/pain    SKIN: Skin is warm and dry, normal skin turgor, mucous membranes moist.  MENTAL STATUS: awake, alert and aware of environment.  EYE: PERRL, both eyes: pupils brisk and reactive to light. Normal size.       Subjective   Patient ID: Dirk Gibson is a 62 y.o. male who presents for Establish Care (Having dizzy spells since the summer. Tongue would get numb.  ).    HPI  Establish care  Presenting to establish care  Denies any past medical conditions    2. Dizziness  Started getting dizzy 10min per day over the summer  Initially thought he was dehydrated, or hypoglycemic  Would drink water and eat sugar but still got dizzy spells  Has still been getting dizzy spells even since the weather has gotten colder and he has not been working the last month  Patient reports that he does not eat breakfast consistently or drink water  Patient drinks 2 pots of coffee a day  Not currently taking any medications  States that lightheadedness is not positional  Has recorded his heart rate into the 40s at times    Review of Systems  All pertinent positive symptoms are included in the history of present illness.    All other systems have been reviewed and are negative and noncontributory to this patient's current ailments.     No Known Allergies     Immunization History   Administered Date(s) Administered    Tdap vaccine, age 7 year and older (BOOSTRIX) 10/31/2019       Objective   Vitals:    01/22/24 1020   BP: 134/82   Pulse: 68   Temp: 36.6 °C (97.8 °F)   SpO2: 99%   Weight: 102 kg (224 lb)       Physical Exam  CONSTITUTIONAL - well nourished, well developed, looks like stated age, in no acute distress, not ill-appearing, and not tired appearing  SKIN - normal skin color and pigmentation, normal skin turgor without rash, lesions, or nodules visualized  HEAD - no trauma, normocephalic  EYES - normal external exam  NECK - supple without rigidity, no neck mass was observed, no thyromegaly or thyroid nodules  CHEST - clear to auscultation, no wheezing, no crackles and no rales, good effort  CARDIAC - regular rate and regular rhythm, no skipped beats, no murmur  EXTREMITIES - no edema, no deformities  NEUROLOGICAL - normal gait, normal  balance, normal motor, no ataxia, alert, oriented and no focal signs  PSYCHIATRIC - alert, pleasant and cordial, age-appropriate  IMMUNOLOGIC - no cervical lymphadenopathy     Assessment/Plan   Establish care  Routine blood work ordered  Will follow up with results    2. Lightheadedness  B12, Vit D levels ordered  24 hour Holter monitor ordered, to evaluate for cardiogenic causes  Carotic artery US ordered to rule out stenosis  Will evaluate for dehydration on CBC    3. Health maintenance  Low dose chest CT ordered for lung cancer screening

## 2024-02-09 NOTE — NURSING
Anesthesia Post-op Note    Patient: Zulma Lowe  Procedure(s) Performed: Bilateral breast revision with fat transfer (Bilateral: Breast)  Anesthesia type: General    Vitals Value Taken Time   Temp 36.1 °C (97 °F) 02/09/24 0906   Pulse 89 02/09/24 0926   Resp 22 02/09/24 0926   SpO2 97 % 02/09/24 0926   /85 02/09/24 0926         Patient Location: PACU Phase 1  Post-op Vital Signs:stable  Level of Consciousness: awake, alert, follows commands and return to baseline  Respiratory Status: spontaneous ventilation  Cardiovascular stable  Hydration: euvolemic  Pain Management: adequately controlled  Handoff: Handoff to receiving clinician was performed and questions were answered  Vomiting: none  Nausea: None  Airway Patency:patent  Post-op Assessment: no complications, patient tolerated procedure well, dentition within defined limits and No Corneal Abrasion      No notable events documented.                       OUTREACH TO SCHEDULE MAMMOGRAM MAILED.

## 2024-02-22 ENCOUNTER — PATIENT MESSAGE (OUTPATIENT)
Dept: OTHER | Facility: OTHER | Age: 54
End: 2024-02-22
Payer: COMMERCIAL

## 2024-03-11 ENCOUNTER — PATIENT OUTREACH (OUTPATIENT)
Dept: ADMINISTRATIVE | Facility: HOSPITAL | Age: 54
End: 2024-03-11
Payer: COMMERCIAL

## 2024-03-11 NOTE — PROGRESS NOTES
Health Maintenance Due   Topic Date Due    Low Dose Statin  Never done    Shingles Vaccine (1 of 2) Never done    COVID-19 Vaccine (5 - 2023-24 season) 09/01/2023      Chart reviewed. Triggered LINKS. Updated Care Everywhere. Patient has upcoming pcp appt on 03/25/24. Patient is also scheduled for fasting lab appt on 03/23/24 with Lipid Panel included.      Reagan Corbett CMA  Population Health Care Coordinator  Primary Care Team

## 2024-03-24 DIAGNOSIS — I10 ESSENTIAL HYPERTENSION: ICD-10-CM

## 2024-03-24 RX ORDER — DICLOFENAC SODIUM 75 MG/1
75 TABLET, DELAYED RELEASE ORAL 2 TIMES DAILY PRN
Qty: 60 TABLET | Refills: 1 | Status: SHIPPED | OUTPATIENT
Start: 2024-03-24

## 2024-03-24 RX ORDER — TRIAMTERENE/HYDROCHLOROTHIAZID 37.5-25 MG
1 TABLET ORAL DAILY
Qty: 90 TABLET | Refills: 4 | Status: SHIPPED | OUTPATIENT
Start: 2024-03-24 | End: 2025-03-24

## 2024-03-24 RX ORDER — PANTOPRAZOLE SODIUM 40 MG/1
40 TABLET, DELAYED RELEASE ORAL DAILY
Qty: 90 TABLET | Refills: 3 | Status: SHIPPED | OUTPATIENT
Start: 2024-03-24 | End: 2025-03-24

## 2024-03-24 RX ORDER — METFORMIN HYDROCHLORIDE 500 MG/1
2000 TABLET, EXTENDED RELEASE ORAL
Qty: 360 TABLET | Refills: 4 | Status: SHIPPED | OUTPATIENT
Start: 2024-03-24

## 2024-03-24 RX ORDER — ERGOCALCIFEROL 1.25 MG/1
50000 CAPSULE ORAL WEEKLY
Qty: 12 CAPSULE | Refills: 4 | Status: SHIPPED | OUTPATIENT
Start: 2024-03-24

## 2024-03-25 ENCOUNTER — OFFICE VISIT (OUTPATIENT)
Dept: INTERNAL MEDICINE | Facility: CLINIC | Age: 54
End: 2024-03-25
Payer: COMMERCIAL

## 2024-03-25 VITALS
HEIGHT: 66 IN | HEART RATE: 73 BPM | WEIGHT: 254.63 LBS | DIASTOLIC BLOOD PRESSURE: 84 MMHG | BODY MASS INDEX: 40.92 KG/M2 | OXYGEN SATURATION: 99 % | SYSTOLIC BLOOD PRESSURE: 130 MMHG

## 2024-03-25 DIAGNOSIS — E55.9 VITAMIN D DEFICIENCY DISEASE: ICD-10-CM

## 2024-03-25 DIAGNOSIS — G47.33 OBSTRUCTIVE SLEEP APNEA: Primary | ICD-10-CM

## 2024-03-25 DIAGNOSIS — E53.8 VITAMIN B12 DEFICIENCY: ICD-10-CM

## 2024-03-25 DIAGNOSIS — E11.9 TYPE 2 DIABETES MELLITUS WITHOUT COMPLICATION, WITHOUT LONG-TERM CURRENT USE OF INSULIN: ICD-10-CM

## 2024-03-25 DIAGNOSIS — I10 ESSENTIAL HYPERTENSION: ICD-10-CM

## 2024-03-25 DIAGNOSIS — M25.569 KNEE PAIN, UNSPECIFIED CHRONICITY, UNSPECIFIED LATERALITY: ICD-10-CM

## 2024-03-25 PROCEDURE — 3075F SYST BP GE 130 - 139MM HG: CPT | Mod: CPTII,S$GLB,, | Performed by: INTERNAL MEDICINE

## 2024-03-25 PROCEDURE — 3044F HG A1C LEVEL LT 7.0%: CPT | Mod: CPTII,S$GLB,, | Performed by: INTERNAL MEDICINE

## 2024-03-25 PROCEDURE — 99214 OFFICE O/P EST MOD 30 MIN: CPT | Mod: S$GLB,,, | Performed by: INTERNAL MEDICINE

## 2024-03-25 PROCEDURE — 1159F MED LIST DOCD IN RCRD: CPT | Mod: CPTII,S$GLB,, | Performed by: INTERNAL MEDICINE

## 2024-03-25 PROCEDURE — 3008F BODY MASS INDEX DOCD: CPT | Mod: CPTII,S$GLB,, | Performed by: INTERNAL MEDICINE

## 2024-03-25 PROCEDURE — 99999 PR PBB SHADOW E&M-EST. PATIENT-LVL IV: CPT | Mod: PBBFAC,,, | Performed by: INTERNAL MEDICINE

## 2024-03-25 PROCEDURE — 3079F DIAST BP 80-89 MM HG: CPT | Mod: CPTII,S$GLB,, | Performed by: INTERNAL MEDICINE

## 2024-03-25 NOTE — TELEPHONE ENCOUNTER
No care due was identified.  [facility] Embedded Care Due Messages. Reference number: [id number].   3/24/2024 9:09:43 PM CDT

## 2024-03-25 NOTE — PROGRESS NOTES
"Chief Complaint: Follow up of multiple issues     HPI:THis is a 53 year old woman who presnets for above    She took Ozempic 0.25 mg for 4 weeks then 0.5 for 2 weeks.  She had some abdominal cramping on the lower dose that got worse on the higher dose.  She had heartburn and  nausea on the medication. She had some consitpation.  She has been off Ozempic since the beginning of the year.  Stomach has been fine since off the medication.     Her weight is lower.  Weight was 270 at our last visit in November. Current weight is 254.  She lost 16 pounds.  She is taking metformin  mg 2-3 tablets daily.  Cannot tolerate 4 tablets - has cramping and diarrhea.      She has some sinus congestion with white mucous.  NO fever or chills.      Urinary urgency is controlled with scheduled toileting.      She continues to  have abdominal issues. Seh will have some left upper quadrant pain sporadically.   She will have a BM and pain is better.   She has 4-5 soft stools when this occurs (about 3 times a week). She takes dicyclomine 10 mg twice daily which helps her abdominal sp asm.  Otherwise she has one BM daily.  She is taking pantoprazole 40 mg in am and famotidine 40 mg in the evening.      She continues to have right knee pain. She has trouble climbing steps.  The right knee keeps her awake at night. She has diclofenac (has not been takign due to stomach)     She has the correct shoes and feet are doing better. She is not taking gabapentin routinely She does her exercises at home. She saw Dr Triplett in orthoepdics for her foot pain. She had a MRI on 3/4/21."The MRI of the left hindfoot reveals significant tenosynovitis with fluid around the posterior tibial tendon and some mild tendinosis near the insertion of the posterior tibial tendon".  She was put in a boot and has weaned out of the boot.  Dr Triplett stopped her from working from March 1 to May 10, 2021.      She gets fever blisters once a year and valtrex 1000 2 " tablets twice daily for only one day which works.  No fever blisters     She has sleep apnea. HEr sleep study 8/1/16 she has severe obstructive sleep apnea. She has a CPAP machine - she has been wearing the CPAP more than usual (2  nights a week) - wears 6-7 hours at night.  The CPAP machine helps. She is wearing the CPAP machine more      Her blood pressure is controlled on triamterene hct 37.5/25 one tablet daily.  NO chest pain, shortness of breath, paplitations.      She is taking vitamin D 50,000 units once a week     Mood is doing ok. No anxiety or depression      She rides the bike for 30 minutes 3 times weekly at [email or web address].  She is working nights at [employer] for [employer].              Past Medical History:   Diagnosis Date    Diabetes mellitus      Hypertension      Obstructive sleep apnea syndrome, severe                        Past Surgical History:   Procedure Laterality Date    COLONOSCOPY N/A 8/22/2018     Procedure: COLONOSCOPY;  Surgeon: Bianca Cooney MD;  Location: [facility];  Service: Endoscopy;  Laterality: N/A;    ENDOMETRIAL ABLATION        ESOPHAGOGASTRODUODENOSCOPY N/A 6/27/2018     Procedure: ESOPHAGOGASTRODUODENOSCOPY (EGD);  Surgeon: Bianca Cooney MD;  Location: [facility];  Service: Endoscopy;  Laterality: N/A;    TOTAL ABDOMINAL HYSTERECTOMY W/ BILATERAL SALPINGOOPHORECTOMY   11/12/2015      Social History                   Socioeconomic History    Marital status:        Spouse name: Not on file    Number of children: Not on file    Years of education: Not on file    Highest education level: Not on file   Occupational History    Occupation: security   Social Needs    Financial resource strain: Not hard at all    Food insecurity       Worry: Never true       Inability: Never true    Transportation needs       Medical: No       Non-medical: No   Tobacco Use    Smoking status: Never Smoker    Smokeless tobacco: Never Used   Substance and Sexual Activity     "Alcohol use: Yes       Alcohol/week: 1.0 standard drinks       Types: 1 Cans of beer per week       Frequency: Monthly or less       Drinks per session: 1 or 2       Binge frequency: Never       Comment: 1-2 drinks a week    Drug use: No    Sexual activity: Never       Birth control/protection: None   Lifestyle    Physical activity       Days per week: Not on file       Minutes per session: Not on file    Stress: Not on file   Relationships    Social connections       Talks on phone: Once a week       Gets together: Never       Attends Confucianist service: Never       Active member of club or organization: No       Attends meetings of clubs or organizations: Never       Relationship status: Living with partner   Other Topics Concern    Are you pregnant or think you may be? Not Asked    Breast-feeding Not Asked   Social History Narrative    Not on file                   Family Status   Relation Name Status    Father       Mother   Alive    MGM       MGF       PGM       PGF       Brother   Alive    Neg Hx   (Not Specified)               Meds and allergies: updated on Epic     REVIEW OF SYSTEMS: No fevers, chills, night sweats, fatigue, visual change, hearing loss, sinus congestion, sore throat, chest pain, shortness of breath, nausea, vomiting, constipation, diarrhea, dysuria, hematuria, polydipsia, polyuria, other joint pain, muscle pain, headaches, anxiety, depression, insomnia.            Physical exam:      /84 (BP Location: Right arm, Patient Position: Sitting, BP Method: Large (Manual))   Pulse 73   Ht 5' 6" (1.676 m)   Wt 115.5 kg (254 lb 10.1 oz)   LMP 10/25/2015   SpO2 99%   BMI 41.10 kg/m²         General: alert, oriented x 3, no apparent distress.  Affect normal  HEENT: Conjunctivae: anicteric, PERRL, EOMI, TM clear fluid on left, normal on right, Oralpharynx clear  Neck: supple, no thyroid enlargement, no cervical lymphadenopathy  Resp: effort normal, lungs " clear bilaterally  CV: Regular rate and rhythm without murmurs, gallops or rubs, no lower extremity edema,       Labs 3/21/24 reviewed           Assessment/Plan:      Knee pain - follow up with orto   Left Foot pain and ankle pain -improved  HTN - controlled  Diabetes . Continue metformin  mg 2 daily. Take Ozempic 0.25 mg once or twice a month  Vitamin D deficiency - on replacement.   Colonoscopy due 2018 - normal  - father  of colon cancer at age 74.  Colonoscopy 23 - 4 small polyps- hyperplastic - due   EGD normal 18  MMG 3/23     Follow up in 4-5 months, [place] if issues

## 2024-04-01 ENCOUNTER — HOSPITAL ENCOUNTER (OUTPATIENT)
Dept: RADIOLOGY | Facility: HOSPITAL | Age: 54
Discharge: HOME | End: 2024-04-01
Attending: INTERNAL MEDICINE
Payer: COMMERCIAL

## 2024-04-01 DIAGNOSIS — Z12.31 ENCOUNTER FOR SCREENING MAMMOGRAM FOR BREAST CANCER: ICD-10-CM

## 2024-04-01 PROCEDURE — 77067 SCR MAMMO BI INCL CAD: CPT | Mod: TC

## 2024-04-01 PROCEDURE — 77063 BREAST TOMOSYNTHESIS BI: CPT | Mod: 26,,, | Performed by: RADIOLOGY

## 2024-04-01 PROCEDURE — 77067 SCR MAMMO BI INCL CAD: CPT | Mod: 26,,, | Performed by: RADIOLOGY

## 2024-04-19 ENCOUNTER — OFFICE VISIT (OUTPATIENT)
Dept: ORTHOPEDICS | Facility: CLINIC | Age: 54
End: 2024-04-19
Payer: COMMERCIAL

## 2024-04-19 VITALS
WEIGHT: 254.63 LBS | DIASTOLIC BLOOD PRESSURE: 84 MMHG | SYSTOLIC BLOOD PRESSURE: 124 MMHG | HEIGHT: 66 IN | BODY MASS INDEX: 40.92 KG/M2 | HEART RATE: 80 BPM

## 2024-04-19 DIAGNOSIS — M17.11 PRIMARY OSTEOARTHRITIS OF RIGHT KNEE: Primary | ICD-10-CM

## 2024-04-19 PROCEDURE — 3044F HG A1C LEVEL LT 7.0%: CPT | Mod: CPTII,S$GLB,,

## 2024-04-19 PROCEDURE — 1159F MED LIST DOCD IN RCRD: CPT | Mod: CPTII,S$GLB,,

## 2024-04-19 PROCEDURE — 3074F SYST BP LT 130 MM HG: CPT | Mod: CPTII,S$GLB,,

## 2024-04-19 PROCEDURE — 99213 OFFICE O/P EST LOW 20 MIN: CPT | Mod: 25,S$GLB,,

## 2024-04-19 PROCEDURE — 3008F BODY MASS INDEX DOCD: CPT | Mod: CPTII,S$GLB,,

## 2024-04-19 PROCEDURE — 20610 DRAIN/INJ JOINT/BURSA W/O US: CPT | Mod: RT,S$GLB,,

## 2024-04-19 PROCEDURE — 99999 PR PBB SHADOW E&M-EST. PATIENT-LVL IV: CPT | Mod: PBBFAC,,,

## 2024-04-19 PROCEDURE — 3079F DIAST BP 80-89 MM HG: CPT | Mod: CPTII,S$GLB,,

## 2024-04-19 RX ORDER — TRIAMCINOLONE ACETONIDE 40 MG/ML
40 INJECTION, SUSPENSION INTRA-ARTICULAR; INTRAMUSCULAR
Status: DISCONTINUED | OUTPATIENT
Start: 2024-04-19 | End: 2024-04-19 | Stop reason: HOSPADM

## 2024-04-19 RX ORDER — TRIAMCINOLONE ACETONIDE 40 MG/ML
40 INJECTION, SUSPENSION INTRA-ARTICULAR; INTRAMUSCULAR
Status: COMPLETED | OUTPATIENT
Start: 2024-04-19 | End: 2024-04-19

## 2024-04-19 RX ADMIN — TRIAMCINOLONE ACETONIDE 40 MG: 40 INJECTION, SUSPENSION INTRA-ARTICULAR; INTRAMUSCULAR at 09:04

## 2024-04-19 NOTE — PROCEDURES
Large Joint Aspiration/Injection: R knee    Date/Time: 4/19/2024 9:00 AM    Performed by: Gloria Mobley PA-C  Authorized by: Gloria Mobley PA-C    Consent Done?:  Yes (Verbal)  Indications:  Pain and arthritis  Site marked: the procedure site was marked    Timeout: prior to procedure the correct patient, procedure, and site was verified    Prep: patient was prepped and draped in usual sterile fashion      Local anesthesia used?: Yes    Local anesthetic:  Bupivacaine 0.25% without epinephrine and topical anesthetic  Anesthetic total (ml):  4      Details:  Needle Size:  22 G  Ultrasonic Guidance for needle placement?: No    Approach:  Anterolateral  Location:  Knee  Site:  R knee  Medications:  40 mg triamcinolone acetonide 40 mg/mL  Patient tolerance:  Patient tolerated the procedure well with no immediate complications

## 2024-04-19 NOTE — PROGRESS NOTES
Patient ID: Dina Martínez is a 53 y.o. female    Chief Complaint:   Chief Complaint   Patient presents with    Right Knee - Injections, Pain       History of Present Illness:    Pleasant 53-year-old female,  here at [facility], who presents for evaluation of right knee pain.  Most of her pain is medial.  This has been going on for several months.  The pain is worse with activity and prolonged sitting or standing.  Does report some swelling.  No significant mechanical symptoms.  Does take anti-inflammatories which helps a little bit.  No history of injections or physical therapy.    ____________________________________________________________________    Interval history 4/19/2024: Patient returns today for follow up of right knee pain. Reports injection helped for a period of time, but has worn off. She is interested in repeat CSI today.        PAST MEDICAL HISTORY:   Past Medical History:   Diagnosis Date    Diabetes mellitus     Hypertension     Obstructive sleep apnea syndrome, severe      PAST SURGICAL HISTORY:   Past Surgical History:   Procedure Laterality Date    COLONOSCOPY N/A 8/22/2018    Procedure: COLONOSCOPY;  Surgeon: Bianca Cooney MD;  Location: [facility];  Service: Endoscopy;  Laterality: N/A;    COLONOSCOPY N/A 7/6/2023    Procedure: COLONOSCOPY;  Surgeon: Ramo Hutson MD;  Location: [facility] ([address] [place] [address]);  Service: Endoscopy;  Laterality: N/A;  prep instructions given to pt and sent to portal -Jm  Suprep  time frame 4-12 weeks  pt diabetic/uses cpap  6/30 pre-call confirmed; MB    ENDOMETRIAL ABLATION      ESOPHAGOGASTRODUODENOSCOPY N/A 6/27/2018    Procedure: ESOPHAGOGASTRODUODENOSCOPY (EGD);  Surgeon: iBanca Cooney MD;  Location: [facility];  Service: Endoscopy;  Laterality: N/A;    TOTAL ABDOMINAL HYSTERECTOMY W/ BILATERAL SALPINGOOPHORECTOMY  11/12/2015     FAMILY HISTORY:   Family History   Problem Relation Name Age of Onset    Hypertension Mother      Colon cancer  Father  74    Cirrhosis Brother      Diabetes Neg Hx      Heart disease Neg Hx      Melanoma Neg Hx       SOCIAL HISTORY:   Social History     Occupational History    Occupation: security   Tobacco Use    Smoking status: Never     Passive exposure: Never    Smokeless tobacco: Never   Substance and Sexual Activity    Alcohol use: Yes     Alcohol/week: 1.0 standard drink of alcohol     Types: 1 Cans of beer per week     Comment: 1-2 drinks a week    Drug use: No    Sexual activity: Never     Birth control/protection: None        MEDICATIONS:   Current Outpatient Medications:     diclofenac (VOLTAREN) 75 MG EC tablet, Take 1 tablet (75 mg total) by mouth 2 (two) times daily as needed (pain)., Disp: 60 tablet, Rfl: 1    dicyclomine (BENTYL) 10 MG capsule, Take 1 capsule (10 mg total) by mouth 3 times daily before meals as needed (abdominal pain)., Disp: 90 capsule, Rfl: 3    ergocalciferol (VITAMIN D2) 50,000 unit Cap, Take 1 capsule (50,000 Units total) by mouth once a week., Disp: 12 capsule, Rfl: 4    famotidine (PEPCID) 40 MG tablet, Take 1 tablet (40 mg total) by mouth once daily., Disp: 90 tablet, Rfl: 4    FLUBLOK QUAD 5315-6385, PF, 180 mcg (45 mcg x 4)/0.5 mL Syrg, , Disp: , Rfl:     loratadine (CLARITIN) 10 mg tablet, Take 1 tablet (10 mg total) by mouth daily as needed for Allergies., Disp: , Rfl:     metFORMIN (GLUCOPHAGE-XR) 500 MG ER 24hr tablet, Take 4 tablets (2,000 mg total) by mouth after dinner., Disp: 360 tablet, Rfl: 4    multivitamin capsule, Take 1 capsule by mouth once daily., Disp: , Rfl:     ondansetron (ZOFRAN-ODT) 4 MG TbDL, Dissolve 1 tablet (4 mg total) by mouth every 8 (eight) hours as needed., Disp: 30 tablet, Rfl: 2    pantoprazole (PROTONIX) 40 MG tablet, Take 1 tablet (40 mg total) by mouth once daily., Disp: 90 tablet, Rfl: 3    semaglutide (OZEMPIC) 0.25 mg or 0.5 mg (2 mg/3 mL) pen injector, 0.25 mg once weekly for 4 weeks then 0.5 mg once weekly, Disp: 3 mL, Rfl: 12     triamterene-hydrochlorothiazide 37.5-25 mg (MAXZIDE-25) 37.5-25 mg per tablet, Take 1 tablet by mouth once daily., Disp: 90 tablet, Rfl: 4    valACYclovir (VALTREX) 1000 MG tablet, Take 2 tablets by mouth twice a day x 1 day for outbreak, Disp: 30 tablet, Rfl: 0  No current facility-administered medications for this visit.  ALLERGIES: Review of patient's allergies indicates:  No Known Allergies      Physical Exam     Vitals:    04/19/24 0900   BP: 124/84   Pulse: 80     Alert and oriented to person, place and time. No acute distress. Well-groomed, not ill appearing. Pupils round and reactive, normal respiratory effort, no audible wheezing.     GENERAL:  A well-developed, well-nourished 53 y.o. female who is alert and       oriented in no acute distress.      Gait: She  walks with a antalgic gait.                   EXTREMITIES:  Examination of lower extremities reveals there is no visible mass or deformity.        Right knee:  ROM 5-130    Ligamentously stable to varus/valgus stress.    Anterior and posterior drawers negative.    No pain over pes bursa.    No warmth    No erythema    Effusion Yes    medial joint line tenderness    Positive Patellofemoral grind/crepitus     The skin over both lower extremities is normal and unremarkable.  She has a  painless range of motion of the hips and ankles bilaterally.   Sensation is intact in both lower extremities.    There are no motor deficits in the lower extremities bilaterally.   Pedal pulses are palpable distally bilaterally.    She has no calf tenderness to palpation nor edema.       Imaging:       X-Ray: I have reviewed all pertinent results/findings and my personal findings are:  Moderate to severe right knee DJD mostly in the medial compartment and patellofemoral compartment with varus deformity      Assessment & Plan    Primary osteoarthritis of right knee  -     Prior authorization Order  -     triamcinolone acetonide injection 40 mg  -     Large Joint  Aspiration/Injection: R knee       Medical Necessity for viscosupplementation use: After thorough evaluation of the patient, I have determined that viscosupplementation treatment is medically necessary. The patient has painful degenerative joint disease (DJD) of the knee(s) with failure of conservative treatments including lifestyle modifications and rehabilitation exercises. Oral analgesics including NSAIDs have not adequately controlled the patient's symptoms. There is radiographic evidence of Kellgren-Donal grade II (or greater) osteoarthritic (OA) changes, or if lack of radiographic evidence, there is arthroscopic or other evidence of chondrosis of the knee(s).     Patient returns today for follow up of right knee pain.  She was interested in repeat injection.  Right knee CSI given, post-injection instructions reviewed.  We will also submit for right knee Euflexxa series.    Pre-authorization placed for Euflexxa series injections right knee.  Ice compress to the affected area 2-3x a day for 15-20 minutes as needed for pain management  NSAIDs PRN for pain management.   RTC to see Gloria newsome PA-C for Euflexxa injections.  She will contact the clinic for scheduling    All of the patient's questions were answered and the patient will contact us if they have any questions or concerns in the interim.

## 2024-04-29 ENCOUNTER — OFFICE VISIT (OUTPATIENT)
Dept: OPTOMETRY | Facility: CLINIC | Age: 54
End: 2024-04-29
Payer: COMMERCIAL

## 2024-04-29 DIAGNOSIS — Z01.00 DIABETIC EYE EXAM: Primary | ICD-10-CM

## 2024-04-29 DIAGNOSIS — E11.9 DIABETIC EYE EXAM: Primary | ICD-10-CM

## 2024-04-29 DIAGNOSIS — H52.13 MYOPIA WITH PRESBYOPIA OF BOTH EYES: ICD-10-CM

## 2024-04-29 DIAGNOSIS — H52.4 MYOPIA WITH PRESBYOPIA OF BOTH EYES: ICD-10-CM

## 2024-04-29 DIAGNOSIS — E11.9 TYPE 2 DIABETES MELLITUS WITHOUT OPHTHALMIC MANIFESTATIONS: ICD-10-CM

## 2024-04-29 PROCEDURE — 99999 PR PBB SHADOW E&M-EST. PATIENT-LVL III: CPT | Mod: PBBFAC,,, | Performed by: OPTOMETRIST

## 2024-04-29 PROCEDURE — 1159F MED LIST DOCD IN RCRD: CPT | Mod: CPTII,S$GLB,, | Performed by: OPTOMETRIST

## 2024-04-29 PROCEDURE — 92015 DETERMINE REFRACTIVE STATE: CPT | Mod: S$GLB,,, | Performed by: OPTOMETRIST

## 2024-04-29 PROCEDURE — 92014 COMPRE OPH EXAM EST PT 1/>: CPT | Mod: S$GLB,,, | Performed by: OPTOMETRIST

## 2024-04-29 PROCEDURE — 3044F HG A1C LEVEL LT 7.0%: CPT | Mod: CPTII,S$GLB,, | Performed by: OPTOMETRIST

## 2024-04-29 PROCEDURE — 2023F DILAT RTA XM W/O RTNOPTHY: CPT | Mod: CPTII,S$GLB,, | Performed by: OPTOMETRIST

## 2024-04-29 NOTE — PROGRESS NOTES
HPI     diabetic eye exam            Comments: Diabetic eye exam  Type 2 diabetes. NIDDM.  Taking Metformin and ozampic.  Wears OTC reading glasses.  Near VA with the reading glasses okay, and   happy with unaided distance VA.          Comments    Patient's age: 53 y.o.  Occupation: [facility]  Approximate date of last eye examination:  1/6/2022   Name of last eye doctor seen: Dr. Sams  City/State: [facility]  Wears glasses? OTC Readers     If yes, wears  Full-time or part-time?  Part-Time  Present glasses are: Bifocal, SV Distance, SV Reading?  SV Readers  Approximate age of present glasses:  States that she has been using   readers for years   Got new glasses following last exam, or subsequently?:  No   Any problem with VA with glasses?  No  Wears CLs?:  No  Headaches?  Yes  Eye pain/discomfort?  No                                                                                     Flashes?  No  Floaters?  No  Diplopia/Double vision?  No  Patient's Ocular History:         Any eye surgeries? No         Any eye injury?  No         Any treatment for eye disease?  No  Family history of eye disease?  No  Significant patient medical history:         1. Diabetes?  Yes       If yes, IDDM or NIDDM? Type 2-taking Metformin   2. HBP?  Yes-controlled              3. Other (describe):  No   ! OTC eyedrops currently using:  No   ! Prescription eye meds currently using:  No   ! Any history of allergy/adverse reaction to any eye meds used   previously?  No    ! Any history of allergy/adverse reaction to eyedrops used during prior   eye exam(s)? No    ! Any history of allergy/adverse reaction to Novacaine or similar meds?   No   ! Any history of allergy/adverse reaction to Epinephrine or similar meds?   No    ! Patient okay with use of anesthetic eyedrops to check eye pressure?    Yes         ! Patient okay with use of eyedrops to dilate pupils today?  Yes   !  Allergies/Medications/Medical History/Family History reviewed today?   "  Yes      PD =   68/64  Desired reading distance =  16"                                                                       Last edited by Rogers Sams, OD on 4/29/2024  3:37 PM.            Assessment /Plan     For exam results, see Encounter Report.    1. Diabetic eye exam        2. Type 2 diabetes mellitus without ophthalmic manifestations        3. Myopia with presbyopia of both eyes                     Good ocular health in both eyes.  Type 2 diabetes without evidence of diabetic retinopathy in either eye.  Slight myopia (nearsightedness) in each eye, with good best-corrected VA in each eye  Presbyopia consistent with age.      Spectacle lens Rx issued for use as desired.  Okay to use +2.00 or +2.25 over-the-counter reading glasses, if happy with near vision with those glasses and if happy with unaided distance vision      Recheck in 12 months - or prior, if any problems or bothersome changes in vision noted in the interim          "

## 2024-05-15 ENCOUNTER — PATIENT MESSAGE (OUTPATIENT)
Dept: OTHER | Facility: OTHER | Age: 54
End: 2024-05-15
Payer: COMMERCIAL

## 2024-06-10 ENCOUNTER — PATIENT MESSAGE (OUTPATIENT)
Dept: INTERNAL MEDICINE | Facility: CLINIC | Age: 54
End: 2024-06-10
Payer: COMMERCIAL

## 2024-07-29 ENCOUNTER — OFFICE VISIT (OUTPATIENT)
Dept: URGENT CARE | Facility: CLINIC | Age: 54
End: 2024-07-29
Payer: COMMERCIAL

## 2024-07-29 VITALS
HEART RATE: 78 BPM | WEIGHT: 256.38 LBS | TEMPERATURE: 98 F | DIASTOLIC BLOOD PRESSURE: 69 MMHG | SYSTOLIC BLOOD PRESSURE: 123 MMHG | OXYGEN SATURATION: 99 % | RESPIRATION RATE: 17 BRPM | BODY MASS INDEX: 41.2 KG/M2 | HEIGHT: 66 IN

## 2024-07-29 DIAGNOSIS — J31.0 RHINITIS, UNSPECIFIED TYPE: ICD-10-CM

## 2024-07-29 DIAGNOSIS — R05.9 COUGH, UNSPECIFIED TYPE: ICD-10-CM

## 2024-07-29 DIAGNOSIS — R09.81 NASAL CONGESTION: ICD-10-CM

## 2024-07-29 DIAGNOSIS — U07.1 COVID-19 VIRUS DETECTED: ICD-10-CM

## 2024-07-29 DIAGNOSIS — U07.1 COVID-19 VIRUS INFECTION: Primary | ICD-10-CM

## 2024-07-29 DIAGNOSIS — R09.82 POST-NASAL DRIP: ICD-10-CM

## 2024-07-29 LAB
CTP QC/QA: YES
SARS-COV-2 AG RESP QL IA.RAPID: POSITIVE

## 2024-07-29 PROCEDURE — 87811 SARS-COV-2 COVID19 W/OPTIC: CPT | Mod: QW,S$GLB,, | Performed by: NURSE PRACTITIONER

## 2024-07-29 PROCEDURE — 99213 OFFICE O/P EST LOW 20 MIN: CPT | Mod: S$GLB,,, | Performed by: NURSE PRACTITIONER

## 2024-07-29 RX ORDER — BENZONATATE 100 MG/1
100 CAPSULE ORAL 3 TIMES DAILY PRN
Qty: 30 CAPSULE | Refills: 0 | Status: SHIPPED | OUTPATIENT
Start: 2024-07-29 | End: 2024-08-08

## 2024-07-29 RX ORDER — PROMETHAZINE HYDROCHLORIDE AND DEXTROMETHORPHAN HYDROBROMIDE 6.25; 15 MG/5ML; MG/5ML
5 SYRUP ORAL NIGHTLY PRN
Qty: 118 ML | Refills: 0 | Status: SHIPPED | OUTPATIENT
Start: 2024-07-29

## 2024-07-29 RX ORDER — LORATADINE 10 MG/1
10 TABLET ORAL DAILY PRN
Qty: 30 TABLET | Refills: 0 | Status: SHIPPED | OUTPATIENT
Start: 2024-07-29

## 2024-07-29 RX ORDER — FLUTICASONE PROPIONATE 50 MCG
2 SPRAY, SUSPENSION (ML) NASAL DAILY PRN
Qty: 16 G | Refills: 0 | Status: SHIPPED | OUTPATIENT
Start: 2024-07-29

## 2024-07-29 NOTE — PATIENT INSTRUCTIONS
POSITIVE COVID TEST           You have tested positive for COVID-19 today.  Please note that patients who test positive for COVID-19 are required by the CDC to undergo isolation for 5 days after their symptoms first began.           This isolation starts from the day you first developed symptoms, not the day of your positive test. For example, if your symptoms began on a Monday but tested positive on the following Wednesday, your 5-day isolation begins from that Monday, not the Wednesday you tested positive.           However, if you are asymptomatic (a person who does not have any symptoms) and COVID-19 positive, your 5-day isolation begins on the day you tested positive, regardless of exposure date.           Also, per the CDC guidelines, once your 5 days have passed, and you have not had fever greater than 100.4F in the last 24 hours without taking any fever reducers such as Tylenol (Acetaminophen) or Motrin (Ibuprofen), you may return to your normal activities including social distancing, wearing masks (continually for 5 more days), and frequent handwashing - YOU DO NOT NEED ANOTHER TEST IN ORDER TO END YOUR QUARANTINE.     Please drink plenty of fluids.  Please get plenty of rest.  Please return here or go to the Emergency Department for any concerns or worsening of condition.  It is ok to take over the counter plain Allegra or Claritin or Zyrtec.   If you do have Hypertension or palpitations, it is safe to take Coricidin HBP for relief of sinus symptoms.  We recommend you take Flonase (Fluticasone) or another nasally inhaled steroid unless you are already taking one.  Nasal irrigation with a saline spray or Netti Pot like device per their directions is also recommended.  If not allergic, please take over the counter Tylenol (Acetaminophen) and/or Motrin (Ibuprofen) as directed for control of pain and/or fever.  Please follow up with your primary care doctor or specialist as needed.    If you  smoke, please  stop smoking.

## 2024-07-29 NOTE — LETTER
July 29, 2024      [facility] [address] [address]  Phone: [phone]  Fax: [phone]       Patient: Dina Martínez   YOB: 1970  Date of Visit: 07/29/2024    To Whom It May Concern:    Sherri Martínez  was at [facility] on 07/29/2024. The patient may return to work/school on 07/31/2024 with MASK restrictions. If you have any questions or concerns, or if I can be of further assistance, please do not hesitate to contact me.    Sincerely,     Petra Viera NP

## 2024-07-29 NOTE — PROGRESS NOTES
"Subjective:      Patient ID: Dina Martínez is a 53 y.o. female.    Vitals:  height is 5' 6" (1.676 m) and weight is 116.3 kg (256 lb 6.3 oz). Her oral temperature is 98.3 °F (36.8 °C). Her blood pressure is 123/69 and her pulse is 78. Her respiration is 17 and oxygen saturation is 99%.     Chief Complaint: COVID-19 Concerns    Pt presents today w/ 100.4 fever at home accompanied w/ non productive cough, sore throat and nasal congestion ; onset approx 3x days ago. Pt reports she tested positive for covid 3x days ago. Pt c/o chills, headache , hoarse voice, sinus pressure , sneezing and swollen glands. Pt tried Mucinex;no relief.     53-year-old female presents to clinic with complaints of fever, cough, chills, headache, hoarseness, sinus pressure, sneezing, swollen glands, sore throat, nasal congestion x 3 days treating with Mucinex. Reports positive home covid test 3 days ago. History of Pfizer covid vaccine x 4 doses, sleep apnea.  10/4/22 + covid treated with Paxlovid and Phenergan DM     Sinus Problem  This is a new problem. The current episode started in the past 7 days. The problem is unchanged. The maximum temperature recorded prior to her arrival was 100.4 - 100.9 F. Her pain is at a severity of 7/10. The pain is severe. Associated symptoms include chills, congestion, coughing, headaches, a hoarse voice, sinus pressure, sneezing, a sore throat and swollen glands. Pertinent negatives include no diaphoresis, ear pain, neck pain or shortness of breath. Treatments tried: Mucinex. The treatment provided no relief.       Constitution: Positive for chills and fever. Negative for sweating.   HENT:  Positive for congestion, sinus pressure, sore throat and voice change. Negative for ear pain.    Neck: Positive for painful lymph nodes. Negative for neck pain.   Respiratory:  Positive for cough. Negative for shortness of breath.    Allergic/Immunologic: Positive for sneezing.   Neurological:  Positive for headaches. "   Hematologic/Lymphatic: Positive for swollen lymph nodes.      Objective:     Physical Exam   Constitutional: She is oriented to person, place, and time. She appears well-developed. She is cooperative.  Non-toxic appearance. She does not appear ill. No distress.   HENT:   Head: Normocephalic and atraumatic.   Ears:   Right Ear: Hearing, tympanic membrane, external ear and ear canal normal.   Left Ear: Hearing, tympanic membrane, external ear and ear canal normal.   Nose: Mucosal edema and rhinorrhea present. No nasal deformity. No epistaxis. Right sinus exhibits no maxillary sinus tenderness and no frontal sinus tenderness. Left sinus exhibits no maxillary sinus tenderness and no frontal sinus tenderness.   Mouth/Throat: Uvula is midline, oropharynx is clear and moist and mucous membranes are normal. No trismus in the jaw. Normal dentition. No uvula swelling. No oropharyngeal exudate, posterior oropharyngeal edema or posterior oropharyngeal erythema.   Eyes: Conjunctivae and lids are normal. No scleral icterus.   Neck: Trachea normal and phonation normal. Neck supple. No edema present. No erythema present. No neck rigidity present.   Cardiovascular: Normal rate, regular rhythm, normal heart sounds and normal pulses.   Pulmonary/Chest: Effort normal and breath sounds normal. No respiratory distress. She has no decreased breath sounds. She has no rhonchi.   Abdominal: Normal appearance.   Musculoskeletal: Normal range of motion.         General: No deformity. Normal range of motion.   Neurological: She is alert and oriented to person, place, and time. She exhibits normal muscle tone. Coordination normal.   Skin: Skin is warm, dry, intact, not diaphoretic and not pale.   Psychiatric: Her speech is normal and behavior is normal. Judgment and thought content normal.   Nursing note and vitals reviewed.      Assessment:     1. COVID-19 virus infection    2. Cough, unspecified type    3. Nasal congestion    4. Rhinitis,  unspecified type    5. Post-nasal drip      Results for orders placed or performed in visit on 07/29/24   SARS Coronavirus 2 Antigen, POCT Manual Read   Result Value Ref Range    SARS Coronavirus 2 Antigen Positive (A) Negative     Acceptable Yes       Plan:       COVID-19 virus infection    Cough, unspecified type  -     SARS Coronavirus 2 Antigen, POCT Manual Read  -     benzonatate (TESSALON) 100 MG capsule; Take 1 capsule (100 mg total) by mouth 3 (three) times daily as needed for Cough.  Dispense: 30 capsule; Refill: 0  -     promethazine-dextromethorphan (PROMETHAZINE-DM) 6.25-15 mg/5 mL Syrp; Take 5 mLs by mouth nightly as needed (cough).  Dispense: 118 mL; Refill: 0    Nasal congestion  -     fluticasone propionate (FLONASE) 50 mcg/actuation nasal spray; 2 sprays (100 mcg total) by Each Nostril route daily as needed (nasal congestion).  Dispense: 15.8 mL; Refill: 0    Rhinitis, unspecified type  -     loratadine (CLARITIN) 10 mg tablet; Take 1 tablet (10 mg total) by mouth daily as needed (rhinitis).  Dispense: 30 tablet; Refill: 0    Post-nasal drip      3 covid risk score discussed, declined paxlovid at this time            Patient Instructions   POSITIVE COVID TEST           You have tested positive for COVID-19 today.  Please note that patients who test positive for COVID-19 are required by the CDC to undergo isolation for 5 days after their symptoms first began.           This isolation starts from the day you first developed symptoms, not the day of your positive test. For example, if your symptoms began on a Monday but tested positive on the following Wednesday, your 5-day isolation begins from that Monday, not the Wednesday you tested positive.           However, if you are asymptomatic (a person who does not have any symptoms) and COVID-19 positive, your 5-day isolation begins on the day you tested positive, regardless of exposure date.           Also, per the CDC guidelines, once  your 5 days have passed, and you have not had fever greater than 100.4F in the last 24 hours without taking any fever reducers such as Tylenol (Acetaminophen) or Motrin (Ibuprofen), you may return to your normal activities including social distancing, wearing masks (continually for 5 more days), and frequent handwashing - YOU DO NOT NEED ANOTHER TEST IN ORDER TO END YOUR QUARANTINE.     Please drink plenty of fluids.  Please get plenty of rest.  Please return here or go to the Emergency Department for any concerns or worsening of condition.  It is ok to take over the counter plain Allegra or Claritin or Zyrtec.   If you do have Hypertension or palpitations, it is safe to take Coricidin HBP for relief of sinus symptoms.  We recommend you take Flonase (Fluticasone) or another nasally inhaled steroid unless you are already taking one.  Nasal irrigation with a saline spray or Netti Pot like device per their directions is also recommended.  If not allergic, please take over the counter Tylenol (Acetaminophen) and/or Motrin (Ibuprofen) as directed for control of pain and/or fever.  Please follow up with your primary care doctor or specialist as needed.    If you  smoke, please stop smoking.

## 2024-08-07 ENCOUNTER — PATIENT MESSAGE (OUTPATIENT)
Dept: OTHER | Facility: OTHER | Age: 54
End: 2024-08-07
Payer: COMMERCIAL

## 2024-08-09 ENCOUNTER — OFFICE VISIT (OUTPATIENT)
Dept: INTERNAL MEDICINE | Facility: CLINIC | Age: 54
End: 2024-08-09
Payer: COMMERCIAL

## 2024-08-09 VITALS
BODY MASS INDEX: 41.08 KG/M2 | DIASTOLIC BLOOD PRESSURE: 82 MMHG | HEIGHT: 66 IN | HEART RATE: 79 BPM | WEIGHT: 255.63 LBS | SYSTOLIC BLOOD PRESSURE: 112 MMHG | OXYGEN SATURATION: 98 %

## 2024-08-09 DIAGNOSIS — E11.9 TYPE 2 DIABETES MELLITUS WITHOUT COMPLICATION, WITHOUT LONG-TERM CURRENT USE OF INSULIN: Primary | ICD-10-CM

## 2024-08-09 DIAGNOSIS — R11.0 NAUSEA: ICD-10-CM

## 2024-08-09 PROCEDURE — 99999 PR PBB SHADOW E&M-EST. PATIENT-LVL III: CPT | Mod: PBBFAC,,, | Performed by: INTERNAL MEDICINE

## 2024-08-09 RX ORDER — DICYCLOMINE HYDROCHLORIDE 10 MG/1
10 CAPSULE ORAL
Qty: 90 CAPSULE | Refills: 3 | Status: SHIPPED | OUTPATIENT
Start: 2024-08-09

## 2024-08-09 RX ORDER — ONDANSETRON 4 MG/1
4 TABLET, ORALLY DISINTEGRATING ORAL EVERY 8 HOURS PRN
Qty: 30 TABLET | Refills: 2 | Status: SHIPPED | OUTPATIENT
Start: 2024-08-09

## 2024-09-12 ENCOUNTER — TELEPHONE (OUTPATIENT)
Dept: ORTHOPEDICS | Facility: CLINIC | Age: 54
End: 2024-09-12
Payer: COMMERCIAL

## 2024-09-12 NOTE — TELEPHONE ENCOUNTER
----- Message from Ila Art sent at 9/12/2024 11:13 AM CDT -----  Contact: [phone]  Type:  Sooner Apoointment Request  Caller is requesting a sooner appointment.  Name of Caller:Dina  When is the first available appointment?soon  Symptoms:M17.11 (ICD-10-CM) - Primary osteoarthritis of right knee  Would the patient rather a call back or a response via [facility]? Call  Best Call Back Number: [phone]  Additional Information:

## 2024-09-16 ENCOUNTER — HOSPITAL ENCOUNTER (OUTPATIENT)
Facility: OTHER | Age: 54
Discharge: HOME | End: 2024-09-19
Attending: EMERGENCY MEDICINE | Admitting: HOSPITALIST
Payer: COMMERCIAL

## 2024-09-16 DIAGNOSIS — R74.01 TRANSAMINITIS: Primary | ICD-10-CM

## 2024-09-16 DIAGNOSIS — K80.20 CALCULUS OF GALLBLADDER WITHOUT CHOLECYSTITIS WITHOUT OBSTRUCTION: ICD-10-CM

## 2024-09-16 DIAGNOSIS — R10.12 LEFT UPPER QUADRANT ABDOMINAL PAIN: ICD-10-CM

## 2024-09-16 DIAGNOSIS — R10.13 EPIGASTRIC ABDOMINAL PAIN: ICD-10-CM

## 2024-09-16 DIAGNOSIS — R91.1 LEFT LOWER LOBE PULMONARY NODULE: ICD-10-CM

## 2024-09-16 LAB
BASOPHILS # BLD AUTO: 0.02 K/UL (ref 0–0.2)
BASOPHILS NFR BLD: 0.3 % (ref 0–1.9)
DIFFERENTIAL METHOD BLD: ABNORMAL
EOSINOPHIL # BLD AUTO: 0 K/UL (ref 0–0.5)
EOSINOPHIL NFR BLD: 0.4 % (ref 0–8)
ERYTHROCYTE [DISTWIDTH] IN BLOOD BY AUTOMATED COUNT: 14.3 % (ref 11.5–14.5)
HCT VFR BLD AUTO: 40.4 % (ref 37–48.5)
HGB BLD-MCNC: 13.3 G/DL (ref 12–16)
IMM GRANULOCYTES # BLD AUTO: 0.02 K/UL (ref 0–0.04)
IMM GRANULOCYTES NFR BLD AUTO: 0.3 % (ref 0–0.5)
LYMPHOCYTES # BLD AUTO: 1.4 K/UL (ref 1–4.8)
LYMPHOCYTES NFR BLD: 20.2 % (ref 18–48)
MCH RBC QN AUTO: 26.7 PG (ref 27–31)
MCHC RBC AUTO-ENTMCNC: 32.9 G/DL (ref 32–36)
MCV RBC AUTO: 81 FL (ref 82–98)
MONOCYTES # BLD AUTO: 0.5 K/UL (ref 0.3–1)
MONOCYTES NFR BLD: 7.7 % (ref 4–15)
NEUTROPHILS # BLD AUTO: 5 K/UL (ref 1.8–7.7)
NEUTROPHILS NFR BLD: 71.1 % (ref 38–73)
NRBC BLD-RTO: 0 /100 WBC
PLATELET # BLD AUTO: 273 K/UL (ref 150–450)
PMV BLD AUTO: 9.6 FL (ref 9.2–12.9)
RBC # BLD AUTO: 4.99 M/UL (ref 4–5.4)
WBC # BLD AUTO: 6.99 K/UL (ref 3.9–12.7)

## 2024-09-16 PROCEDURE — 63600175 PHARM REV CODE 636 W HCPCS: Performed by: EMERGENCY MEDICINE

## 2024-09-16 PROCEDURE — 96374 THER/PROPH/DIAG INJ IV PUSH: CPT

## 2024-09-16 PROCEDURE — 93005 ELECTROCARDIOGRAM TRACING: CPT

## 2024-09-16 PROCEDURE — 80053 COMPREHEN METABOLIC PANEL: CPT | Performed by: EMERGENCY MEDICINE

## 2024-09-16 PROCEDURE — 93010 ELECTROCARDIOGRAM REPORT: CPT | Mod: ,,, | Performed by: INTERNAL MEDICINE

## 2024-09-16 PROCEDURE — 83690 ASSAY OF LIPASE: CPT | Performed by: EMERGENCY MEDICINE

## 2024-09-16 PROCEDURE — 85025 COMPLETE CBC W/AUTO DIFF WBC: CPT | Performed by: EMERGENCY MEDICINE

## 2024-09-16 PROCEDURE — 99285 EMERGENCY DEPT VISIT HI MDM: CPT | Mod: 25

## 2024-09-16 RX ORDER — FAMOTIDINE 10 MG/ML
20 INJECTION INTRAVENOUS
Status: COMPLETED | OUTPATIENT
Start: 2024-09-17 | End: 2024-09-17

## 2024-09-16 RX ORDER — SODIUM CHLORIDE 9 MG/ML
1000 INJECTION, SOLUTION INTRAVENOUS
Status: COMPLETED | OUTPATIENT
Start: 2024-09-17 | End: 2024-09-17

## 2024-09-16 RX ORDER — ONDANSETRON HYDROCHLORIDE 2 MG/ML
4 INJECTION, SOLUTION INTRAVENOUS
Status: COMPLETED | OUTPATIENT
Start: 2024-09-16 | End: 2024-09-16

## 2024-09-16 RX ORDER — MORPHINE SULFATE 2 MG/ML
2 INJECTION, SOLUTION INTRAMUSCULAR; INTRAVENOUS
Status: COMPLETED | OUTPATIENT
Start: 2024-09-17 | End: 2024-09-17

## 2024-09-16 RX ADMIN — ONDANSETRON 4 MG: 2 INJECTION INTRAMUSCULAR; INTRAVENOUS at 11:09

## 2024-09-16 NOTE — Clinical Note
Diagnosis: Transaminitis [048142]   Future Attending Provider: TALIA JACINTO [[id number]]   Is the patient being sent to ED Observation?: Yes

## 2024-09-17 PROBLEM — K80.00 CALCULUS OF GALLBLADDER WITH ACUTE CHOLECYSTITIS WITHOUT OBSTRUCTION: Status: ACTIVE | Noted: 2024-09-17

## 2024-09-17 LAB
ALBUMIN SERPL BCP-MCNC: 3.5 G/DL (ref 3.5–5.2)
ALBUMIN SERPL BCP-MCNC: 3.7 G/DL (ref 3.5–5.2)
ALP SERPL-CCNC: 135 U/L (ref 55–135)
ALP SERPL-CCNC: 159 U/L (ref 55–135)
ALT SERPL W/O P-5'-P-CCNC: 148 U/L (ref 10–44)
ALT SERPL W/O P-5'-P-CCNC: 238 U/L (ref 10–44)
ANION GAP SERPL CALC-SCNC: 10 MMOL/L (ref 8–16)
ANION GAP SERPL CALC-SCNC: 11 MMOL/L (ref 8–16)
AST SERPL-CCNC: 292 U/L (ref 10–40)
AST SERPL-CCNC: 339 U/L (ref 10–40)
BILIRUB SERPL-MCNC: 0.6 MG/DL (ref 0.1–1)
BILIRUB SERPL-MCNC: 0.8 MG/DL (ref 0.1–1)
BILIRUB UR QL STRIP: NEGATIVE
BUN SERPL-MCNC: 10 MG/DL (ref 6–20)
BUN SERPL-MCNC: 14 MG/DL (ref 6–20)
CALCIUM SERPL-MCNC: 9.3 MG/DL (ref 8.7–10.5)
CALCIUM SERPL-MCNC: 9.5 MG/DL (ref 8.7–10.5)
CHLORIDE SERPL-SCNC: 102 MMOL/L (ref 95–110)
CHLORIDE SERPL-SCNC: 106 MMOL/L (ref 95–110)
CLARITY UR: CLEAR
CO2 SERPL-SCNC: 24 MMOL/L (ref 23–29)
CO2 SERPL-SCNC: 26 MMOL/L (ref 23–29)
COLOR UR: COLORLESS
CREAT SERPL-MCNC: 0.8 MG/DL (ref 0.5–1.4)
CREAT SERPL-MCNC: 0.8 MG/DL (ref 0.5–1.4)
EST. GFR  (NO RACE VARIABLE): >60 ML/MIN/1.73 M^2
EST. GFR  (NO RACE VARIABLE): >60 ML/MIN/1.73 M^2
GLUCOSE SERPL-MCNC: 107 MG/DL (ref 70–110)
GLUCOSE SERPL-MCNC: 119 MG/DL (ref 70–110)
GLUCOSE UR QL STRIP: NEGATIVE
HGB UR QL STRIP: NEGATIVE
KETONES UR QL STRIP: NEGATIVE
LEUKOCYTE ESTERASE UR QL STRIP: NEGATIVE
LIPASE SERPL-CCNC: 35 U/L (ref 4–60)
NITRITE UR QL STRIP: NEGATIVE
OHS QRS DURATION: 72 MS
OHS QTC CALCULATION: 428 MS
PH UR STRIP: 7 [PH] (ref 5–8)
POCT GLUCOSE: 105 MG/DL (ref 70–110)
POCT GLUCOSE: 141 MG/DL (ref 70–110)
POTASSIUM SERPL-SCNC: 3.8 MMOL/L (ref 3.5–5.1)
POTASSIUM SERPL-SCNC: 3.8 MMOL/L (ref 3.5–5.1)
PROT SERPL-MCNC: 7.4 G/DL (ref 6–8.4)
PROT SERPL-MCNC: 7.6 G/DL (ref 6–8.4)
PROT UR QL STRIP: NEGATIVE
SODIUM SERPL-SCNC: 139 MMOL/L (ref 136–145)
SODIUM SERPL-SCNC: 140 MMOL/L (ref 136–145)
SP GR UR STRIP: <1.005 (ref 1–1.03)
URN SPEC COLLECT METH UR: ABNORMAL
UROBILINOGEN UR STRIP-ACNC: NEGATIVE EU/DL

## 2024-09-17 PROCEDURE — 25500020 PHARM REV CODE 255: Performed by: EMERGENCY MEDICINE

## 2024-09-17 PROCEDURE — 27000190 HC CPAP FULL FACE MASK W/VALVE

## 2024-09-17 PROCEDURE — 96375 TX/PRO/DX INJ NEW DRUG ADDON: CPT

## 2024-09-17 PROCEDURE — 81003 URINALYSIS AUTO W/O SCOPE: CPT | Performed by: EMERGENCY MEDICINE

## 2024-09-17 PROCEDURE — G0378 HOSPITAL OBSERVATION PER HR: HCPCS

## 2024-09-17 PROCEDURE — 25000003 PHARM REV CODE 250: Performed by: EMERGENCY MEDICINE

## 2024-09-17 PROCEDURE — 99204 OFFICE O/P NEW MOD 45 MIN: CPT | Mod: 57,,, | Performed by: SURGERY

## 2024-09-17 PROCEDURE — 94660 CPAP INITIATION&MGMT: CPT

## 2024-09-17 PROCEDURE — 99900035 HC TECH TIME PER 15 MIN (STAT)

## 2024-09-17 PROCEDURE — 80053 COMPREHEN METABOLIC PANEL: CPT | Performed by: EMERGENCY MEDICINE

## 2024-09-17 PROCEDURE — A4216 STERILE WATER/SALINE, 10 ML: HCPCS | Performed by: EMERGENCY MEDICINE

## 2024-09-17 PROCEDURE — 96361 HYDRATE IV INFUSION ADD-ON: CPT

## 2024-09-17 PROCEDURE — 63600175 PHARM REV CODE 636 W HCPCS: Performed by: EMERGENCY MEDICINE

## 2024-09-17 RX ORDER — SUCRALFATE 1 G/10ML
1 SUSPENSION ORAL EVERY 6 HOURS
Status: DISCONTINUED | OUTPATIENT
Start: 2024-09-17 | End: 2024-09-19 | Stop reason: HOSPADM

## 2024-09-17 RX ORDER — PANTOPRAZOLE SODIUM 40 MG/1
40 TABLET, DELAYED RELEASE ORAL DAILY
Status: DISCONTINUED | OUTPATIENT
Start: 2024-09-17 | End: 2024-09-19 | Stop reason: HOSPADM

## 2024-09-17 RX ORDER — FAMOTIDINE 20 MG/1
20 TABLET, FILM COATED ORAL 2 TIMES DAILY
Status: DISCONTINUED | OUTPATIENT
Start: 2024-09-17 | End: 2024-09-17

## 2024-09-17 RX ORDER — ALUMINUM HYDROXIDE, MAGNESIUM HYDROXIDE, AND SIMETHICONE 1200; 120; 1200 MG/30ML; MG/30ML; MG/30ML
30 SUSPENSION ORAL
Status: DISCONTINUED | OUTPATIENT
Start: 2024-09-17 | End: 2024-09-19 | Stop reason: HOSPADM

## 2024-09-17 RX ORDER — GLUCAGON 1 MG
1 KIT INJECTION
Status: DISCONTINUED | OUTPATIENT
Start: 2024-09-17 | End: 2024-09-19 | Stop reason: HOSPADM

## 2024-09-17 RX ORDER — INSULIN ASPART 100 [IU]/ML
0-5 INJECTION, SOLUTION INTRAVENOUS; SUBCUTANEOUS
Status: DISCONTINUED | OUTPATIENT
Start: 2024-09-17 | End: 2024-09-19 | Stop reason: HOSPADM

## 2024-09-17 RX ORDER — MORPHINE SULFATE 2 MG/ML
2 INJECTION, SOLUTION INTRAMUSCULAR; INTRAVENOUS EVERY 4 HOURS PRN
Status: DISCONTINUED | OUTPATIENT
Start: 2024-09-17 | End: 2024-09-18

## 2024-09-17 RX ORDER — SODIUM CHLORIDE 0.9 % (FLUSH) 0.9 %
10 SYRINGE (ML) INJECTION
Status: DISCONTINUED | OUTPATIENT
Start: 2024-09-17 | End: 2024-09-19 | Stop reason: HOSPADM

## 2024-09-17 RX ORDER — HYDROCHLOROTHIAZIDE 25 MG/1
25 TABLET ORAL DAILY
Status: DISCONTINUED | OUTPATIENT
Start: 2024-09-18 | End: 2024-09-19 | Stop reason: HOSPADM

## 2024-09-17 RX ORDER — ONDANSETRON 8 MG/1
8 TABLET, ORALLY DISINTEGRATING ORAL EVERY 8 HOURS PRN
Status: DISCONTINUED | OUTPATIENT
Start: 2024-09-17 | End: 2024-09-19 | Stop reason: HOSPADM

## 2024-09-17 RX ORDER — FAMOTIDINE 20 MG/1
40 TABLET, FILM COATED ORAL DAILY
Status: DISCONTINUED | OUTPATIENT
Start: 2024-09-17 | End: 2024-09-19 | Stop reason: HOSPADM

## 2024-09-17 RX ORDER — IBUPROFEN 200 MG
24 TABLET ORAL
Status: DISCONTINUED | OUTPATIENT
Start: 2024-09-17 | End: 2024-09-19 | Stop reason: HOSPADM

## 2024-09-17 RX ORDER — ACETAMINOPHEN 325 MG/1
650 TABLET ORAL EVERY 8 HOURS PRN
Status: DISCONTINUED | OUTPATIENT
Start: 2024-09-17 | End: 2024-09-19 | Stop reason: HOSPADM

## 2024-09-17 RX ORDER — TALC
6 POWDER (GRAM) TOPICAL NIGHTLY PRN
Status: DISCONTINUED | OUTPATIENT
Start: 2024-09-17 | End: 2024-09-19 | Stop reason: HOSPADM

## 2024-09-17 RX ORDER — IBUPROFEN 200 MG
16 TABLET ORAL
Status: DISCONTINUED | OUTPATIENT
Start: 2024-09-17 | End: 2024-09-19 | Stop reason: HOSPADM

## 2024-09-17 RX ORDER — MORPHINE SULFATE 4 MG/ML
4 INJECTION, SOLUTION INTRAMUSCULAR; INTRAVENOUS EVERY 4 HOURS PRN
Status: DISCONTINUED | OUTPATIENT
Start: 2024-09-17 | End: 2024-09-18

## 2024-09-17 RX ADMIN — SUCRALFATE 1 G: 1 SUSPENSION ORAL at 12:09

## 2024-09-17 RX ADMIN — SUCRALFATE 1 G: 1 SUSPENSION ORAL at 11:09

## 2024-09-17 RX ADMIN — ALUMINUM HYDROXIDE, MAGNESIUM HYDROXIDE, AND DIMETHICONE 30 ML: 200; 20; 200 SUSPENSION ORAL at 11:09

## 2024-09-17 RX ADMIN — ALUMINUM HYDROXIDE, MAGNESIUM HYDROXIDE, AND DIMETHICONE 30 ML: 200; 20; 200 SUSPENSION ORAL at 09:09

## 2024-09-17 RX ADMIN — SUCRALFATE 1 G: 1 SUSPENSION ORAL at 05:09

## 2024-09-17 RX ADMIN — PANTOPRAZOLE SODIUM 40 MG: 40 TABLET, DELAYED RELEASE ORAL at 07:09

## 2024-09-17 RX ADMIN — ALUMINUM HYDROXIDE, MAGNESIUM HYDROXIDE, AND DIMETHICONE 30 ML: 200; 20; 200 SUSPENSION ORAL at 06:09

## 2024-09-17 RX ADMIN — ALUMINUM HYDROXIDE, MAGNESIUM HYDROXIDE, AND DIMETHICONE 30 ML: 200; 20; 200 SUSPENSION ORAL at 05:09

## 2024-09-17 RX ADMIN — SUCRALFATE 1 G: 1 SUSPENSION ORAL at 07:09

## 2024-09-17 RX ADMIN — IOHEXOL 100 ML: 350 INJECTION, SOLUTION INTRAVENOUS at 12:09

## 2024-09-17 RX ADMIN — MORPHINE SULFATE 2 MG: 2 INJECTION, SOLUTION INTRAMUSCULAR; INTRAVENOUS at 12:09

## 2024-09-17 RX ADMIN — SODIUM CHLORIDE 1000 ML: 9 INJECTION, SOLUTION INTRAVENOUS at 12:09

## 2024-09-17 RX ADMIN — FAMOTIDINE 40 MG: 20 TABLET ORAL at 07:09

## 2024-09-17 RX ADMIN — Medication 10 ML: at 05:09

## 2024-09-17 RX ADMIN — FAMOTIDINE 20 MG: 10 INJECTION, SOLUTION INTRAVENOUS at 12:09

## 2024-09-17 NOTE — SUBJECTIVE & OBJECTIVE
Past Medical History:   Diagnosis Date    Diabetes mellitus     Hypertension     Obstructive sleep apnea syndrome, severe        Past Surgical History:   Procedure Laterality Date    COLONOSCOPY N/A 8/22/2018    Procedure: COLONOSCOPY;  Surgeon: Bianca Cooney MD;  Location: [facility] ENDO;  Service: Endoscopy;  Laterality: N/A;    COLONOSCOPY N/A 7/6/2023    Procedure: COLONOSCOPY;  Surgeon: Ramo Hutson MD;  [facility]  Service: Endoscopy;  Laterality: N/A;  prep instructions given to pt and sent to portal -Jm  Suprep  time frame 4-12 weeks  pt diabetic/uses cpap  6/30 pre-call confirmed; MB    ENDOMETRIAL ABLATION      ESOPHAGOGASTRODUODENOSCOPY N/A 6/27/2018    Procedure: ESOPHAGOGASTRODUODENOSCOPY (EGD);  Surgeon: Bianca Cooney MD;  Location: [facility] ENDO;  Service: Endoscopy;  Laterality: N/A;    TOTAL ABDOMINAL HYSTERECTOMY W/ BILATERAL SALPINGOOPHORECTOMY  11/12/2015       Review of patient's allergies indicates:  No Known Allergies    No current facility-administered medications on file prior to encounter.     Current Outpatient Medications on File Prior to Encounter   Medication Sig    dicyclomine (BENTYL) 10 MG capsule Take 1 capsule (10 mg total) by mouth 3 times daily before meals as needed (abdominal pain).    ergocalciferol (VITAMIN D2) 50,000 unit Cap Take 1 capsule (50,000 Units total) by mouth once a week.    fluticasone propionate (FLONASE) 50 mcg/actuation nasal spray Instill 2 sprays (100 mcg total) by Each Nostril route daily as needed (nasal congestion).    metFORMIN (GLUCOPHAGE-XR) 500 MG ER 24hr tablet Take 4 tablets (2,000 mg total) by mouth after dinner.    multivitamin capsule Take 1 capsule by mouth once daily.    ondansetron (ZOFRAN-ODT) 4 MG TbDL Dissolve 1 tablet (4 mg total) by mouth every 8 (eight) hours as needed.    pantoprazole (PROTONIX) 40 MG tablet Take 1 tablet (40 mg total) by mouth once daily.    triamterene-hydrochlorothiazide 37.5-25 mg  (MAXZIDE-25) 37.5-25 mg per tablet Take 1 tablet by mouth once daily.    famotidine (PEPCID) 40 MG tablet Take 1 tablet (40 mg total) by mouth once daily.    semaglutide (OZEMPIC) 0.25 mg or 0.5 mg (2 mg/3 mL) pen injector 0.25 mg once weekly for 4 weeks then 0.5 mg once weekly    valACYclovir (VALTREX) 1000 MG tablet Take 2 tablets by mouth twice a day x 1 day for outbreak    [DISCONTINUED] diclofenac (VOLTAREN) 75 MG EC tablet Take 1 tablet (75 mg total) by mouth 2 (two) times daily as needed (pain).    [DISCONTINUED] FLUBLOK QUAD [id number], PF, 180 mcg (45 mcg x 4)/0.5 mL Syrg     [DISCONTINUED] loratadine (CLARITIN) 10 mg tablet Take 1 tablet (10 mg total) by mouth daily as needed (rhinitis).    [DISCONTINUED] promethazine-dextromethorphan (PROMETHAZINE-DM) 6.25-15 mg/5 mL Syrp Take 5 mLs by mouth nightly as needed (cough). (Patient not taking: Reported on 8/9/2024)     Family History       Problem Relation (Age of Onset)    Cirrhosis Brother    Colon cancer Father (74)    Hypertension Mother          Tobacco Use    Smoking status: Never     Passive exposure: Never    Smokeless tobacco: Never   Substance and Sexual Activity    Alcohol use: Yes     Alcohol/week: 1.0 standard drink of alcohol     Types: 1 Cans of beer per week     Comment: 1-2 drinks a week    Drug use: No    Sexual activity: Never     Birth control/protection: None     Review of Systems   Constitutional:  Positive for appetite change. Negative for activity change and fever.   HENT:  Negative for congestion.    Eyes:  Negative for visual disturbance.   Respiratory:  Negative for chest tightness and shortness of breath.    Cardiovascular:  Positive for chest pain. Negative for leg swelling.   Gastrointestinal:  Positive for abdominal pain. Negative for abdominal distention, constipation, nausea and vomiting.   Endocrine: Negative for polydipsia, polyphagia and polyuria.   Genitourinary:  Negative for difficulty urinating and dysuria.    Musculoskeletal:  Positive for back pain.   Neurological:  Negative for dizziness.   Psychiatric/Behavioral:  Negative for agitation.      Objective:     Vital Signs (Most Recent):  Temp: 98.1 °F (36.7 °C) (09/17/24 0535)  Pulse: 73 (09/17/24 0535)  Resp: 13 (09/17/24 0535)  BP: (!) 148/79 (09/17/24 0535)  SpO2: 99 % (09/17/24 0535) Vital Signs (24h Range):  Temp:  [98 °F (36.7 °C)-98.4 °F (36.9 °C)] 98.1 °F (36.7 °C)  Pulse:  [72-84] 73  Resp:  [13-17] 13  SpO2:  [96 %-99 %] 99 %  BP: (100-148)/(55-88) 148/79     Weight: 112.9 kg (249 lb)  Body mass index is 40.19 kg/m².     Physical Exam  Vitals and nursing note reviewed.   HENT:      Mouth/Throat:      Mouth: Mucous membranes are moist.   Eyes:      Pupils: Pupils are equal, round, and reactive to light.   Cardiovascular:      Rate and Rhythm: Normal rate.   Pulmonary:      Effort: Pulmonary effort is normal.   Abdominal:      General: Bowel sounds are normal.      Palpations: Abdomen is soft. There is no mass.      Tenderness: There is abdominal tenderness. There is no guarding.      Hernia: No hernia is present.   Musculoskeletal:         General: Normal range of motion.      Right lower leg: No edema.      Left lower leg: No edema.   Skin:     General: Skin is warm.   Neurological:      General: No focal deficit present.      Mental Status: She is alert and oriented to person, place, and time.              CRANIAL NERVES     CN III, IV, VI   Pupils are equal, round, and reactive to light.       Significant Labs: All pertinent labs within the past 24 hours have been reviewed.  BMP:   Recent Labs   Lab 09/17/24  0553         K 3.8      CO2 24   BUN 10   CREATININE 0.8   CALCIUM 9.3     CBC:   Recent Labs   Lab 09/16/24 2349   WBC 6.99   HGB 13.3   HCT 40.4        CMP:   Recent Labs   Lab 09/16/24  2349 09/17/24  0553    140   K 3.8 3.8    106   CO2 26 24   * 107   BUN 14 10   CREATININE 0.8 0.8   CALCIUM 9.5 9.3    PROT 7.6 7.4   ALBUMIN 3.7 3.5   BILITOT 0.8 0.6   ALKPHOS 135 159*   * 339*   * 238*   ANIONGAP 11 10       Significant Imaging: I have reviewed all pertinent imaging results/findings within the past 24 hours.  US Abdomen Limited  Narrative: EXAMINATION:  ULTRASOUND ABDOMEN LIMITED (GALLBLADDER)    CLINICAL HISTORY:  epigastric pain;    TECHNIQUE:  Limited ultrasound of the right upper quadrant of the abdomen with attention to the gallbladder was performed.    COMPARISON:  None.    FINDINGS:  Gallbladder: Multiple calculi.  The largest measures less than 1 cm.  No pericholecystic fluid.  No sonographic Bourne's sign.  The thickened gallbladder wall measures up to 4.2 mm in maximal thickness.    Biliary system: The common duct is not dilated, measuring 3 mm.  No intrahepatic ductal dilatation.    Miscellaneous: No upper abdominal ascites.  The liver is increased echogenicity relative to the right renal cortex.  Impression: Cholelithiasis.  Positive thick-walled gallbladder wall.  No pericholecystic fluid.  No sonographic Bourne sign.    Equivocal hepatic steatosis.    Electronically signed by: Melissa Martínez  Date:    09/17/2024  Time:    02:02  CT Abdomen Pelvis With IV Contrast NO Oral Contrast  Narrative: EXAMINATION:  CT OF ABDOMEN PELVIS WITH    CLINICAL HISTORY:  Epigastric pain;    TECHNIQUE:  5 mm enhanced axial images were obtained from the lung bases through the greater trochanters.  One hundred mL of Omnipaque 350 was injected.    COMPARISON:  05/30/2023    FINDINGS:  The liver, spleen, pancreas, kidneys, and adrenal glands are unremarkable. The thick-walled gallbladder contains no calcified gallstones.  A calcified splenic granulomas present.    There is no definite evidence for abdominal adenopathy or ascites.  A small fat containing umbilical hernia is present.    There are no pelvic masses or adenopathy.  There are prominent right lower quadrant mesenteric lymph nodes.  The appendix  is not inflamed.    There is no free fluid in the pelvis.    At the lung bases, there is a stable 3-4 mm left lower lobe pulmonary nodule (series 2 axial image 18).  Otherwise, the lung bases are essentially clear.  Impression: Thickening of the gallbladder wall or gallbladder wall edema.  No calcified gallstones.  Consider dedicated gallbladder ultrasound.    3-4 mm left lower lobe nodule.  For a solid nodule <6 mm, Fleischner Society 2017 guidelines recommend no routine follow up for a low risk patient, or follow-up with non-contrast chest CT at 12 months in a high risk patient.    This report was flagged in Epic as abnormal.    Electronically signed by: Melissa Martínez  Date:    09/17/2024  Time:    00:54

## 2024-09-17 NOTE — ED NOTES
Pt says she feels better. Pain down 4/10. VSS. NADN. Family member at bedside. Call light in reach

## 2024-09-17 NOTE — ED PROVIDER NOTES
Encounter Date: 9/16/2024       History     Chief Complaint   Patient presents with    Abdominal Pain     Upper epigastric pain w/ nausea over the past 5 days.  Described as a constant tightness     53-year-old female with history of diabetes, hypertension, and BILL presents complaining of left upper abdominal pain for the past 5 days that radiates to her back and occasionally to her chest.  The pain was initially intermittent but she states it has been constant over the past several hours today.  She does report associated nausea but no vomiting, fever, chills, urinary symptoms, or changes in bowel movements.  She denies any shortness of breath.  She denies any similar pain.  She has tried Bentyl and Protonix with no relief.  These medications were prescribed to her previously for other abdominal issues.  He denies any previous abdominal surgeries.  She was no history of peptic ulcer disease, gastritis, or pancreatitis.  She denies any heavy alcohol use or NSAID use.    Review of patient's allergies indicates:  No Known Allergies  Past Medical History:   Diagnosis Date    Diabetes mellitus     Hypertension     Obstructive sleep apnea syndrome, severe      Past Surgical History:   Procedure Laterality Date    COLONOSCOPY N/A 8/22/2018    Procedure: COLONOSCOPY;  Surgeon: Bianca Cooney MD;  Location: [facility];  Service: Endoscopy;  Laterality: N/A;    COLONOSCOPY N/A 7/6/2023    Procedure: COLONOSCOPY;  Surgeon: Ramo Hutson MD;  Location: [facility] ([address] [place] [address]);  Service: Endoscopy;  Laterality: N/A;  prep instructions given to pt and sent to portal -Jm  Suprep  time frame 4-12 weeks  pt diabetic/uses cpap  6/30 pre-call confirmed; MB    ENDOMETRIAL ABLATION      ESOPHAGOGASTRODUODENOSCOPY N/A 6/27/2018    Procedure: ESOPHAGOGASTRODUODENOSCOPY (EGD);  Surgeon: Bianca Coonye MD;  Location: [facility];  Service: Endoscopy;  Laterality: N/A;    TOTAL ABDOMINAL HYSTERECTOMY W/ BILATERAL  SALPINGOOPHORECTOMY  11/12/2015     Family History   Problem Relation Name Age of Onset    Hypertension Mother      Colon cancer Father  74    Cirrhosis Brother      Diabetes Neg Hx      Heart disease Neg Hx      Melanoma Neg Hx       Social History     Tobacco Use    Smoking status: Never     Passive exposure: Never    Smokeless tobacco: Never   Substance Use Topics    Alcohol use: Yes     Alcohol/week: 1.0 standard drink of alcohol     Types: 1 Cans of beer per week     Comment: 1-2 drinks a week    Drug use: No     Review of Systems    Physical Exam     Initial Vitals [09/16/24 2219]   BP Pulse Resp Temp SpO2   (!) 141/88 84 17 98 °F (36.7 °C) 98 %      MAP       --         Physical Exam    Nursing note and vitals reviewed.  Constitutional: She appears well-developed and well-nourished. She is not diaphoretic. No distress.   HENT:   Head: Normocephalic and atraumatic.   Right Ear: External ear normal.   Left Ear: External ear normal.   Nose: Nose normal.   Eyes: Conjunctivae and EOM are normal. Right eye exhibits no discharge. Left eye exhibits no discharge.   Cardiovascular:  Normal rate, regular rhythm and normal heart sounds.     Exam reveals no gallop and no friction rub.       No murmur heard.  Pulmonary/Chest: Breath sounds normal. No respiratory distress. She has no wheezes. She has no rhonchi. She has no rales. She exhibits no tenderness.   Abdominal: Abdomen is soft. Bowel sounds are normal. She exhibits no distension and no mass.   Left upper quadrant tenderness to palpation.  No CVA tenderness. There is no rebound and no guarding.   Musculoskeletal:         General: Normal range of motion.     Neurological: She is oriented to person, place, and time.       ED Course   Procedures  Labs Reviewed   CBC W/ AUTO DIFFERENTIAL   COMPREHENSIVE METABOLIC PANEL   LIPASE   URINALYSIS, REFLEX TO URINE CULTURE     EKG Readings: (Independently Interpreted)   Normal sinus rhythm, heart rate 74, no  significant ST/T-wave changes       Imaging Results    None          Medications   0.9%  NaCl infusion (has no administration in time range)   morphine injection 2 mg (has no administration in time range)   famotidine (PF) injection 20 mg (has no administration in time range)   ondansetron injection 4 mg (4 mg Intravenous Given 9/16/24 4404)     Medical Decision Making  Ddx includes:  Gastritis, peptic ulcer disease, Pancreatitis, colitis, diverticulitis, malignancy, renal colic, hiatal hernia, incarcerated internal hernia, musculoskeletal etiology.    Lab work significant for elevated LFTs which is new compared to baseline but normal lipase, alkaline phosphatase and total bili.  No leukocytosis or anemia.  CT with gallbladder wall thickening and left pulmonary nodule only.  Ultrasound of the gallbladder shows multiple stones and wall thickening but no other findings concerning for acute cholecystitis.    On re-evaluation the patient reports improvement in her pain but still rates it a 4/10.  She states that as long as she does not need it is fine.  Will place in the EDOU further monitoring after eating breakfast with the plan that if her pain is controlled she will follow-up with hepatology for further evaluation of her transaminitis and General surgery to discuss cholecystectomy.    Amount and/or Complexity of Data Reviewed  Labs: ordered.  Radiology: ordered.    Risk  OTC drugs.  Prescription drug management.                                      Clinical Impression:  Final diagnoses:  [R10.13] Epigastric abdominal pain                 Itzel Andino MD  09/17/24 7120

## 2024-09-17 NOTE — HPI
Mrs Arroyo is a 53-year-old female with history of diabetes, hypertension, and BILL presents complaining of left upper abdominal pain for the past 5 days that radiates to her back and occasionally to her chest. The pain was initially intermittent but she states it has been constant over the past several hours today. She does report associated nausea but no vomiting, fever, chills, urinary symptoms, or changes in bowel movements. She denies any shortness of breath. She denies any similar pain. She has tried Bentyl and Protonix with no relief. These medications were prescribed to her previously for other abdominal issues. He denies any previous abdominal surgeries. She was no history of peptic ulcer disease, gastritis, or pancreatitis. She denies any heavy alcohol use or NSAID use.

## 2024-09-17 NOTE — ASSESSMENT & PLAN NOTE
Abdominal pain radiating to back   No N/V/Constipation  Afebrile, No elevated WBCs    - CT A/P:  reports gallbladder wall thickening on US and  - elevated AST/ALT  - General Surgery consulted. Appreciate recommendations including: NPO midnight for laparoscopic cholecystectomy 9/18

## 2024-09-17 NOTE — CONSULTS
Surgery Consult Note      SUBJECTIVE:     Chief Complaint: abdominal pain     History of Present Illness:  Dina Martínez is a 53 y.o. female presents with RUQ abdominal pain that radiates to her back.  She reports that this episode of pain started Thursday and was the most severe episode.  Reports prior episodes.  Was admitted to EDOU overnight for work up.  Initially pain improved but had breakfast this morning and pain worsened.  In ED patient noted to have bilirubin of 0.6.  Mild transaminitis.  US with GB wall thickening and gallstones.        Review of patient's allergies indicates:  No Known Allergies    Past Medical History:   Diagnosis Date    Diabetes mellitus     Hypertension     Obstructive sleep apnea syndrome, severe      Past Surgical History:   Procedure Laterality Date    COLONOSCOPY N/A 8/22/2018    Procedure: COLONOSCOPY;  Surgeon: Bianca Cooney MD;  Location: [facility];  Service: Endoscopy;  Laterality: N/A;    COLONOSCOPY N/A 7/6/2023    Procedure: COLONOSCOPY;  Surgeon: Ramo Hutson MD;  Location: [facility] ([address] [place] [address]);  Service: Endoscopy;  Laterality: N/A;  prep instructions given to pt and sent to portal -Jm  Suprep  time frame 4-12 weeks  pt diabetic/uses cpap  6/30 pre-call confirmed; MB    ENDOMETRIAL ABLATION      ESOPHAGOGASTRODUODENOSCOPY N/A 6/27/2018    Procedure: ESOPHAGOGASTRODUODENOSCOPY (EGD);  Surgeon: Bianca Cooney MD;  Location: [facility];  Service: Endoscopy;  Laterality: N/A;    TOTAL ABDOMINAL HYSTERECTOMY W/ BILATERAL SALPINGOOPHORECTOMY  11/12/2015     Family History   Problem Relation Name Age of Onset    Hypertension Mother      Colon cancer Father  74    Cirrhosis Brother      Diabetes Neg Hx      Heart disease Neg Hx      Melanoma Neg Hx       Social History     Tobacco Use    Smoking status: Never     Passive exposure: Never    Smokeless tobacco: Never   Substance Use Topics    Alcohol use: Yes     Alcohol/week: 1.0 standard drink of alcohol      "Types: 1 Cans of beer per week     Comment: 1-2 drinks a week    Drug use: No        Review of Systems:  Review of Systems   All other systems reviewed and are negative.      OBJECTIVE:     Vital Signs (Most Recent)  BP (!) 148/79 (BP Location: Left arm, Patient Position: Sitting)   Pulse 73   Temp 98.1 °F (36.7 °C) (Oral)   Resp 13   Ht 5' 6" (1.676 m)   Wt 112.9 kg (249 lb)   LMP 10/25/2015   SpO2 99%   Breastfeeding No   BMI 40.19 kg/m²     Physical Exam:  General: 53 y.o. female in no distress   Neuro: alert and oriented x 4.  Moves all extremities.     HEENT: normocephalic, atraumatic, PERRL, EOMI   Respiratory: respirations are even and unlabored  Cardiac: regular rate and rhythm  Abdomen: soft, NTND  Extremities: Warm dry and intact  Skin: no rashes      Labs:   Recent Results (from the past 336 hour(s))   CBC Auto Differential    Collection Time: 09/16/24 11:49 PM   Result Value Ref Range    WBC 6.99 3.90 - 12.70 K/uL    Hemoglobin 13.3 12.0 - 16.0 g/dL    Hematocrit 40.4 37.0 - 48.5 %    Platelets 273 150 - 450 K/uL                 Component Ref Range & Units 05:53 1 d ago 1 mo ago 6 mo ago 8 mo ago 10 mo ago 1 yr ago   Sodium 136 - 145 mmol/L 140 139 139 143 136 137 141   Potassium 3.5 - 5.1 mmol/L 3.8 3.8 3.7 4.2 3.5 4.0 3.8   Comment: Specimen slightly hemolyzed   Chloride 95 - 110 mmol/L 106 102 103 104 102 100 105   CO2 23 - 29 mmol/L 24 26 27 26 18 Low  22 Low  24   Glucose 70 - 110 mg/dL 107 119 High  99 104 200 High  128 High  116 High    BUN 6 - 20 mg/dL 10 14 17 13 13 13 11   Creatinine 0.5 - 1.4 mg/dL 0.8 0.8 0.7 0.8 0.9 0.8 0.8   Calcium 8.7 - 10.5 mg/dL 9.3 9.5 9.5 9.7 9.0 10.1 9.5   Total Protein 6.0 - 8.4 g/dL 7.4 7.6 7.7 7.2 7.3 8.4 7.3   Albumin 3.5 - 5.2 g/dL 3.5 3.7 3.6 3.6 3.5 4.3 3.6   Total Bilirubin 0.1 - 1.0 mg/dL 0.6 0.8 CM 0.3 CM 0.3 CM 0.3 CM 0.3 CM 0.3 CM      Alkaline Phosphatase 55 - 135 U/L 159 High  135 96 86 75 86 82   AST 10 - 40 U/L 339 High  292 High  15 13 17 19 " 13   ALT 10 - 44 U/L 238 High  148 High  17 14 15 19 17   eGFR >60 mL/min/1.73 m^2 >60 >60 >60.0 >60.0 >60.0 >60 >60.0   Anion Gap 8 - 16 mmol/L 10 11 9              Imaging:   FINDINGS:  Gallbladder: Multiple calculi.  The largest measures less than 1 cm.  No pericholecystic fluid.  No sonographic Bourne's sign.  The thickened gallbladder wall measures up to 4.2 mm in maximal thickness.     Biliary system: The common duct is not dilated, measuring 3 mm.  No intrahepatic ductal dilatation.     Miscellaneous: No upper abdominal ascites.  The liver is increased echogenicity relative to the right renal cortex.     Impression:     Cholelithiasis.  Positive thick-walled gallbladder wall.  No pericholecystic fluid.  No sonographic Bourne sign.     Equivocal hepatic steatosis.        Electronically signed by:Melissa Martínez  Date:                                            09/17/2024  Time:                                           02:02      ASSESSMENT/PLAN:       53 y.o. female with acute cholecystitis.      - labs and imaging personally reviewed and consistent with acute cholecystitis given gallbladder wall thickening on US and elevated AST/ALT.    - patient ate breakfast this morning, will plan for laparoscopic cholecystectomy tomorrow  - risks, benefits and alternatives of surgery discussed with patient   - consent in AM   - NPO at midnight.  Okay for clears today.     Soumya Cagle MD  Staff Surgeon  Colon & Rectal Surgery

## 2024-09-17 NOTE — ED TRIAGE NOTES
Dina Martínez, a 53 y.o. female presents to the ED via private car with complaints of mid upper abdominal pain times 5 days.Pt resting comfortably. Connected to cardiac monitor, BP cuff, and pulse oximeter. ED workup in progress. Call light within reach. Safety measures in place. Denies further needs. Plan of care ongoing.      Chief Complaint   Patient presents with    Abdominal Pain     Upper epigastric pain w/ nausea over the past 5 days.  Described as a constant tightness     Review of patient's allergies indicates:  No Known Allergies  Past Medical History:   Diagnosis Date    Diabetes mellitus     Hypertension     Obstructive sleep apnea syndrome, severe      Past Surgical History:   Procedure Laterality Date    COLONOSCOPY N/A 8/22/2018    Procedure: COLONOSCOPY;  Surgeon: Bianca Cooney MD;  Location: [facility];  Service: Endoscopy;  Laterality: N/A;    COLONOSCOPY N/A 7/6/2023    Procedure: COLONOSCOPY;  Surgeon: Ramo Hutson MD;  Location: [facility] ([address] [place] [address]);  Service: Endoscopy;  Laterality: N/A;  prep instructions given to pt and sent to portal -Jm  Suprep  time frame 4-12 weeks  pt diabetic/uses cpap  6/30 pre-call confirmed; MB    ENDOMETRIAL ABLATION      ESOPHAGOGASTRODUODENOSCOPY N/A 6/27/2018    Procedure: ESOPHAGOGASTRODUODENOSCOPY (EGD);  Surgeon: Bianca Cooney MD;  Location: [facility];  Service: Endoscopy;  Laterality: N/A;    TOTAL ABDOMINAL HYSTERECTOMY W/ BILATERAL SALPINGOOPHORECTOMY  11/12/2015

## 2024-09-17 NOTE — H&P
[facility]  History & Physical    Patient Name: Dina Martínez  MRN: [id number]  Patient Class: OP- Observation  Admission Date: 9/16/2024  Attending Physician: Sandy Hercules MD   Primary Care Provider: Pamela Arnold MD         Patient information was obtained from patient, past medical records, and ER records.     Subjective:     Principal Problem:Calculus of gallbladder with acute cholecystitis without obstruction    Chief Complaint:   Chief Complaint   Patient presents with    Abdominal Pain     Upper epigastric pain w/ nausea over the past 5 days.  Described as a constant tightness        HPI: Mrs Arroyo is a 53-year-old female with history of diabetes, hypertension, and BILL presents complaining of left upper abdominal pain for the past 5 days that radiates to her back and occasionally to her chest. The pain was initially intermittent but she states it has been constant over the past several hours today. She does report associated nausea but no vomiting, fever, chills, urinary symptoms, or changes in bowel movements. She denies any shortness of breath. She denies any similar pain. She has tried Bentyl and Protonix with no relief. These medications were prescribed to her previously for other abdominal issues. He denies any previous abdominal surgeries. She was no history of peptic ulcer disease, gastritis, or pancreatitis. She denies any heavy alcohol use or NSAID use.     Past Medical History:   Diagnosis Date    Diabetes mellitus     Hypertension     Obstructive sleep apnea syndrome, severe        Past Surgical History:   Procedure Laterality Date    COLONOSCOPY N/A 8/22/2018    Procedure: COLONOSCOPY;  Surgeon: Bianca Cooney MD;  Location: [facility];  Service: Endoscopy;  Laterality: N/A;    COLONOSCOPY N/A 7/6/2023    Procedure: COLONOSCOPY;  Surgeon: Ramo Hutson MD;  Location: [facility] ([address] [place] [address]);  Service: Endoscopy;  Laterality: N/A;  prep instructions  given to pt and sent to [facility]  Suprep  time frame 4-12 weeks  pt diabetic/uses cpap  6/30 pre-call confirmed; MB    ENDOMETRIAL ABLATION      ESOPHAGOGASTRODUODENOSCOPY N/A 6/27/2018    Procedure: ESOPHAGOGASTRODUODENOSCOPY (EGD);  Surgeon: Bianca Cooney MD;  Location: [facility];  Service: Endoscopy;  Laterality: N/A;    TOTAL ABDOMINAL HYSTERECTOMY W/ BILATERAL SALPINGOOPHORECTOMY  11/12/2015       Review of patient's allergies indicates:  No Known Allergies    No current facility-administered medications on file prior to encounter.     Current Outpatient Medications on File Prior to Encounter   Medication Sig    dicyclomine (BENTYL) 10 MG capsule Take 1 capsule (10 mg total) by mouth 3 times daily before meals as needed (abdominal pain).    ergocalciferol (VITAMIN D2) 50,000 unit Cap Take 1 capsule (50,000 Units total) by mouth once a week.    fluticasone propionate (FLONASE) 50 mcg/actuation nasal spray Instill 2 sprays (100 mcg total) by Each Nostril route daily as needed (nasal congestion).    metFORMIN (GLUCOPHAGE-XR) 500 MG ER 24hr tablet Take 4 tablets (2,000 mg total) by mouth after dinner.    multivitamin capsule Take 1 capsule by mouth once daily.    ondansetron (ZOFRAN-ODT) 4 MG TbDL Dissolve 1 tablet (4 mg total) by mouth every 8 (eight) hours as needed.    pantoprazole (PROTONIX) 40 MG tablet Take 1 tablet (40 mg total) by mouth once daily.    triamterene-hydrochlorothiazide 37.5-25 mg (MAXZIDE-25) 37.5-25 mg per tablet Take 1 tablet by mouth once daily.    famotidine (PEPCID) 40 MG tablet Take 1 tablet (40 mg total) by mouth once daily.    semaglutide (OZEMPIC) 0.25 mg or 0.5 mg (2 mg/3 mL) pen injector 0.25 mg once weekly for 4 weeks then 0.5 mg once weekly    valACYclovir (VALTREX) 1000 MG tablet Take 2 tablets by mouth twice a day x 1 day for outbreak    [DISCONTINUED] diclofenac (VOLTAREN) 75 MG EC tablet Take 1 tablet (75 mg total) by mouth 2 (two) times daily as needed (pain).     [DISCONTINUED] FLUBLOK QUAD 8532-2781, PF, 180 mcg (45 mcg x 4)/0.5 mL Syrg     [DISCONTINUED] loratadine (CLARITIN) 10 mg tablet Take 1 tablet (10 mg total) by mouth daily as needed (rhinitis).    [DISCONTINUED] promethazine-dextromethorphan (PROMETHAZINE-DM) 6.25-15 mg/5 mL Syrp Take 5 mLs by mouth nightly as needed (cough). (Patient not taking: Reported on 8/9/2024)     Family History       Problem Relation (Age of Onset)    Cirrhosis Brother    Colon cancer Father (74)    Hypertension Mother          Tobacco Use    Smoking status: Never     Passive exposure: Never    Smokeless tobacco: Never   Substance and Sexual Activity    Alcohol use: Yes     Alcohol/week: 1.0 standard drink of alcohol     Types: 1 Cans of beer per week     Comment: 1-2 drinks a week    Drug use: No    Sexual activity: Never     Birth control/protection: None     Review of Systems   Constitutional:  Positive for appetite change. Negative for activity change and fever.   HENT:  Negative for congestion.    Eyes:  Negative for visual disturbance.   Respiratory:  Negative for chest tightness and shortness of breath.    Cardiovascular:  Positive for chest pain. Negative for leg swelling.   Gastrointestinal:  Positive for abdominal pain. Negative for abdominal distention, constipation, nausea and vomiting.   Endocrine: Negative for polydipsia, polyphagia and polyuria.   Genitourinary:  Negative for difficulty urinating and dysuria.   Musculoskeletal:  Positive for back pain.   Neurological:  Negative for dizziness.   Psychiatric/Behavioral:  Negative for agitation.      Objective:     Vital Signs (Most Recent):  Temp: 98.1 °F (36.7 °C) (09/17/24 0535)  Pulse: 73 (09/17/24 0535)  Resp: 13 (09/17/24 0535)  BP: (!) 148/79 (09/17/24 0535)  SpO2: 99 % (09/17/24 0535) Vital Signs (24h Range):  Temp:  [98 °F (36.7 °C)-98.4 °F (36.9 °C)] 98.1 °F (36.7 °C)  Pulse:  [72-84] 73  Resp:  [13-17] 13  SpO2:  [96 %-99 %] 99 %  BP: (100-148)/(55-88) 148/79      Weight: 112.9 kg (249 lb)  Body mass index is 40.19 kg/m².     Physical Exam  Vitals and nursing note reviewed.   HENT:      Mouth/Throat:      Mouth: Mucous membranes are moist.   Eyes:      Pupils: Pupils are equal, round, and reactive to light.   Cardiovascular:      Rate and Rhythm: Normal rate.   Pulmonary:      Effort: Pulmonary effort is normal.   Abdominal:      General: Bowel sounds are normal.      Palpations: Abdomen is soft. There is no mass.      Tenderness: There is abdominal tenderness. There is no guarding.      Hernia: No hernia is present.   Musculoskeletal:         General: Normal range of motion.      Right lower leg: No edema.      Left lower leg: No edema.   Skin:     General: Skin is warm.   Neurological:      General: No focal deficit present.      Mental Status: She is alert and oriented to person, place, and time.              CRANIAL NERVES     CN III, IV, VI   Pupils are equal, round, and reactive to light.       Significant Labs: All pertinent labs within the past 24 hours have been reviewed.  BMP:   Recent Labs   Lab 09/17/24  0553         K 3.8      CO2 24   BUN 10   CREATININE 0.8   CALCIUM 9.3     CBC:   Recent Labs   Lab 09/16/24  2349   WBC 6.99   HGB 13.3   HCT 40.4        CMP:   Recent Labs   Lab 09/16/24  2349 09/17/24  0553    140   K 3.8 3.8    106   CO2 26 24   * 107   BUN 14 10   CREATININE 0.8 0.8   CALCIUM 9.5 9.3   PROT 7.6 7.4   ALBUMIN 3.7 3.5   BILITOT 0.8 0.6   ALKPHOS 135 159*   * 339*   * 238*   ANIONGAP 11 10       Significant Imaging: I have reviewed all pertinent imaging results/findings within the past 24 hours.  US Abdomen Limited  Narrative: EXAMINATION:  ULTRASOUND ABDOMEN LIMITED (GALLBLADDER)    CLINICAL HISTORY:  epigastric pain;    TECHNIQUE:  Limited ultrasound of the right upper quadrant of the abdomen with attention to the gallbladder was  performed.    COMPARISON:  None.    FINDINGS:  Gallbladder: Multiple calculi.  The largest measures less than 1 cm.  No pericholecystic fluid.  No sonographic Bourne's sign.  The thickened gallbladder wall measures up to 4.2 mm in maximal thickness.    Biliary system: The common duct is not dilated, measuring 3 mm.  No intrahepatic ductal dilatation.    Miscellaneous: No upper abdominal ascites.  The liver is increased echogenicity relative to the right renal cortex.  Impression: Cholelithiasis.  Positive thick-walled gallbladder wall.  No pericholecystic fluid.  No sonographic Bourne sign.    Equivocal hepatic steatosis.    Electronically signed by: Melissa Martínez  Date:    09/17/2024  Time:    02:02  CT Abdomen Pelvis With IV Contrast NO Oral Contrast  Narrative: EXAMINATION:  CT OF ABDOMEN PELVIS WITH    CLINICAL HISTORY:  Epigastric pain;    TECHNIQUE:  5 mm enhanced axial images were obtained from the lung bases through the greater trochanters.  One hundred mL of Omnipaque 350 was injected.    COMPARISON:  05/30/2023    FINDINGS:  The liver, spleen, pancreas, kidneys, and adrenal glands are unremarkable. The thick-walled gallbladder contains no calcified gallstones.  A calcified splenic granulomas present.    There is no definite evidence for abdominal adenopathy or ascites.  A small fat containing umbilical hernia is present.    There are no pelvic masses or adenopathy.  There are prominent right lower quadrant mesenteric lymph nodes.  The appendix is not inflamed.    There is no free fluid in the pelvis.    At the lung bases, there is a stable 3-4 mm left lower lobe pulmonary nodule (series 2 axial image 18).  Otherwise, the lung bases are essentially clear.  Impression: Thickening of the gallbladder wall or gallbladder wall edema.  No calcified gallstones.  Consider dedicated gallbladder ultrasound.    3-4 mm left lower lobe nodule.  For a solid nodule <6 mm, Fleischner Society 2017 guidelines recommend no  routine follow up for a low risk patient, or follow-up with non-contrast chest CT at 12 months in a high risk patient.    This report was flagged in Epic as abnormal.    Electronically signed by: Melissa Martínez  Date:    09/17/2024  Time:    00:54      Assessment/Plan:     * Calculus of gallbladder with acute cholecystitis without obstruction  Abdominal pain radiating to back   No N/V/Constipation  Afebrile, No elevated WBCs    - CT A/P:  reports gallbladder wall thickening on US and  - elevated AST/ALT  - General Surgery consulted. Appreciate recommendations including: NPO midnight for laparoscopic cholecystectomy 9/18    Type 2 diabetes mellitus, without long-term current use of insulin  Monitor glucose AC/HS. Low dose SSI ordered. Patient takes metformin at home.       Obstructive sleep apnea syndrome, severe  CPAP ordered for qhs        VTE Risk Mitigation (From admission, onward)           Ordered     IP VTE HIGH RISK PATIENT  Once         09/17/24 0517     Place sequential compression device  Until discontinued         09/17/24 0517                         On 09/17/2024, patient  placed in hospital observation services under my care in collaboration with Dr Hercules.           Nidhi Mcgill, JANIS  Department of Hospital Medicine  [facility] - Emergency Dept

## 2024-09-18 ENCOUNTER — ANESTHESIA EVENT (OUTPATIENT)
Dept: SURGERY | Facility: OTHER | Age: 54
End: 2024-09-18
Payer: COMMERCIAL

## 2024-09-18 ENCOUNTER — ANESTHESIA (OUTPATIENT)
Dept: SURGERY | Facility: OTHER | Age: 54
End: 2024-09-18
Payer: COMMERCIAL

## 2024-09-18 LAB
ALBUMIN SERPL BCP-MCNC: 3.4 G/DL (ref 3.5–5.2)
ALP SERPL-CCNC: 139 U/L (ref 55–135)
ALT SERPL W/O P-5'-P-CCNC: 160 U/L (ref 10–44)
ANION GAP SERPL CALC-SCNC: 9 MMOL/L (ref 8–16)
AST SERPL-CCNC: 93 U/L (ref 10–40)
BASOPHILS # BLD AUTO: 0.02 K/UL (ref 0–0.2)
BASOPHILS NFR BLD: 0.4 % (ref 0–1.9)
BILIRUB SERPL-MCNC: 0.5 MG/DL (ref 0.1–1)
BUN SERPL-MCNC: 8 MG/DL (ref 6–20)
CALCIUM SERPL-MCNC: 9.5 MG/DL (ref 8.7–10.5)
CHLORIDE SERPL-SCNC: 106 MMOL/L (ref 95–110)
CO2 SERPL-SCNC: 26 MMOL/L (ref 23–29)
CREAT SERPL-MCNC: 0.8 MG/DL (ref 0.5–1.4)
DIFFERENTIAL METHOD BLD: ABNORMAL
EOSINOPHIL # BLD AUTO: 0.1 K/UL (ref 0–0.5)
EOSINOPHIL NFR BLD: 1.8 % (ref 0–8)
ERYTHROCYTE [DISTWIDTH] IN BLOOD BY AUTOMATED COUNT: 14.5 % (ref 11.5–14.5)
EST. GFR  (NO RACE VARIABLE): >60 ML/MIN/1.73 M^2
GLUCOSE SERPL-MCNC: 110 MG/DL (ref 70–110)
HCT VFR BLD AUTO: 39.7 % (ref 37–48.5)
HGB BLD-MCNC: 13 G/DL (ref 12–16)
IMM GRANULOCYTES # BLD AUTO: 0.01 K/UL (ref 0–0.04)
IMM GRANULOCYTES NFR BLD AUTO: 0.2 % (ref 0–0.5)
LYMPHOCYTES # BLD AUTO: 1.8 K/UL (ref 1–4.8)
LYMPHOCYTES NFR BLD: 36.3 % (ref 18–48)
MCH RBC QN AUTO: 26.6 PG (ref 27–31)
MCHC RBC AUTO-ENTMCNC: 32.7 G/DL (ref 32–36)
MCV RBC AUTO: 81 FL (ref 82–98)
MONOCYTES # BLD AUTO: 0.5 K/UL (ref 0.3–1)
MONOCYTES NFR BLD: 10.1 % (ref 4–15)
NEUTROPHILS # BLD AUTO: 2.5 K/UL (ref 1.8–7.7)
NEUTROPHILS NFR BLD: 51.2 % (ref 38–73)
NRBC BLD-RTO: 0 /100 WBC
PLATELET # BLD AUTO: 275 K/UL (ref 150–450)
PMV BLD AUTO: 9.8 FL (ref 9.2–12.9)
POCT GLUCOSE: 102 MG/DL (ref 70–110)
POCT GLUCOSE: 129 MG/DL (ref 70–110)
POCT GLUCOSE: 139 MG/DL (ref 70–110)
POTASSIUM SERPL-SCNC: 3.5 MMOL/L (ref 3.5–5.1)
PROT SERPL-MCNC: 7 G/DL (ref 6–8.4)
RBC # BLD AUTO: 4.89 M/UL (ref 4–5.4)
SODIUM SERPL-SCNC: 141 MMOL/L (ref 136–145)
WBC # BLD AUTO: 4.87 K/UL (ref 3.9–12.7)

## 2024-09-18 PROCEDURE — 63600175 PHARM REV CODE 636 W HCPCS: Performed by: NURSE PRACTITIONER

## 2024-09-18 PROCEDURE — 99231 SBSQ HOSP IP/OBS SF/LOW 25: CPT | Mod: 57,,, | Performed by: SURGERY

## 2024-09-18 PROCEDURE — 25000003 PHARM REV CODE 250: Performed by: NURSE PRACTITIONER

## 2024-09-18 PROCEDURE — 71000033 HC RECOVERY, INTIAL HOUR: Performed by: SURGERY

## 2024-09-18 PROCEDURE — 88304 TISSUE EXAM BY PATHOLOGIST: CPT | Mod: 26,,, | Performed by: PATHOLOGY

## 2024-09-18 PROCEDURE — 94761 N-INVAS EAR/PLS OXIMETRY MLT: CPT

## 2024-09-18 PROCEDURE — 71000039 HC RECOVERY, EACH ADD'L HOUR: Performed by: SURGERY

## 2024-09-18 PROCEDURE — 63600175 PHARM REV CODE 636 W HCPCS: Performed by: STUDENT IN AN ORGANIZED HEALTH CARE EDUCATION/TRAINING PROGRAM

## 2024-09-18 PROCEDURE — 99900035 HC TECH TIME PER 15 MIN (STAT)

## 2024-09-18 PROCEDURE — C1729 CATH, DRAINAGE: HCPCS | Performed by: SURGERY

## 2024-09-18 PROCEDURE — 25000003 PHARM REV CODE 250: Performed by: ANESTHESIOLOGY

## 2024-09-18 PROCEDURE — 47562 LAPAROSCOPIC CHOLECYSTECTOMY: CPT | Mod: ,,, | Performed by: SURGERY

## 2024-09-18 PROCEDURE — 80053 COMPREHEN METABOLIC PANEL: CPT | Performed by: NURSE PRACTITIONER

## 2024-09-18 PROCEDURE — 37000008 HC ANESTHESIA 1ST 15 MINUTES: Performed by: SURGERY

## 2024-09-18 PROCEDURE — 99900031 HC PATIENT EDUCATION (STAT)

## 2024-09-18 PROCEDURE — 25000003 PHARM REV CODE 250: Performed by: SURGERY

## 2024-09-18 PROCEDURE — 25000003 PHARM REV CODE 250: Performed by: EMERGENCY MEDICINE

## 2024-09-18 PROCEDURE — 88304 TISSUE EXAM BY PATHOLOGIST: CPT | Performed by: PATHOLOGY

## 2024-09-18 PROCEDURE — G0378 HOSPITAL OBSERVATION PER HR: HCPCS

## 2024-09-18 PROCEDURE — 63600175 PHARM REV CODE 636 W HCPCS

## 2024-09-18 PROCEDURE — C9290 INJ, BUPIVACAINE LIPOSOME: HCPCS | Performed by: ANESTHESIOLOGY

## 2024-09-18 PROCEDURE — 64486 TAP BLOCK UNIL BY INJECTION: CPT | Performed by: ANESTHESIOLOGY

## 2024-09-18 PROCEDURE — 63600175 PHARM REV CODE 636 W HCPCS: Performed by: ANESTHESIOLOGY

## 2024-09-18 PROCEDURE — 25000003 PHARM REV CODE 250: Performed by: STUDENT IN AN ORGANIZED HEALTH CARE EDUCATION/TRAINING PROGRAM

## 2024-09-18 PROCEDURE — 85025 COMPLETE CBC W/AUTO DIFF WBC: CPT | Performed by: NURSE PRACTITIONER

## 2024-09-18 PROCEDURE — 37000009 HC ANESTHESIA EA ADD 15 MINS: Performed by: SURGERY

## 2024-09-18 PROCEDURE — 27201423 OPTIME MED/SURG SUP & DEVICES STERILE SUPPLY: Performed by: SURGERY

## 2024-09-18 PROCEDURE — 96361 HYDRATE IV INFUSION ADD-ON: CPT

## 2024-09-18 PROCEDURE — 36000709 HC OR TIME LEV III EA ADD 15 MIN: Performed by: SURGERY

## 2024-09-18 PROCEDURE — 36000708 HC OR TIME LEV III 1ST 15 MIN: Performed by: SURGERY

## 2024-09-18 PROCEDURE — 63600175 PHARM REV CODE 636 W HCPCS: Mod: JZ,JG | Performed by: ANESTHESIOLOGY

## 2024-09-18 PROCEDURE — 94660 CPAP INITIATION&MGMT: CPT

## 2024-09-18 PROCEDURE — 36415 COLL VENOUS BLD VENIPUNCTURE: CPT | Performed by: NURSE PRACTITIONER

## 2024-09-18 RX ORDER — OXYCODONE HYDROCHLORIDE 10 MG/1
10 TABLET ORAL EVERY 4 HOURS PRN
Status: DISCONTINUED | OUTPATIENT
Start: 2024-09-18 | End: 2024-09-19 | Stop reason: HOSPADM

## 2024-09-18 RX ORDER — OXYCODONE HYDROCHLORIDE 5 MG/1
5 TABLET ORAL
Status: DISCONTINUED | OUTPATIENT
Start: 2024-09-18 | End: 2024-09-18

## 2024-09-18 RX ORDER — HYDROMORPHONE HYDROCHLORIDE 2 MG/ML
0.4 INJECTION, SOLUTION INTRAMUSCULAR; INTRAVENOUS; SUBCUTANEOUS EVERY 5 MIN PRN
Status: DISCONTINUED | OUTPATIENT
Start: 2024-09-18 | End: 2024-09-18

## 2024-09-18 RX ORDER — ONDANSETRON HYDROCHLORIDE 2 MG/ML
INJECTION, SOLUTION INTRAMUSCULAR; INTRAVENOUS
Status: DISCONTINUED | OUTPATIENT
Start: 2024-09-18 | End: 2024-09-18

## 2024-09-18 RX ORDER — PROCHLORPERAZINE EDISYLATE 5 MG/ML
5 INJECTION INTRAMUSCULAR; INTRAVENOUS EVERY 30 MIN PRN
Status: DISCONTINUED | OUTPATIENT
Start: 2024-09-18 | End: 2024-09-18

## 2024-09-18 RX ORDER — MEPERIDINE HYDROCHLORIDE 25 MG/ML
12.5 INJECTION INTRAMUSCULAR; INTRAVENOUS; SUBCUTANEOUS ONCE AS NEEDED
Status: DISCONTINUED | OUTPATIENT
Start: 2024-09-18 | End: 2024-09-18

## 2024-09-18 RX ORDER — CEFAZOLIN SODIUM 1 G/3ML
INJECTION, POWDER, FOR SOLUTION INTRAMUSCULAR; INTRAVENOUS
Status: DISCONTINUED | OUTPATIENT
Start: 2024-09-18 | End: 2024-09-18

## 2024-09-18 RX ORDER — MIDAZOLAM HYDROCHLORIDE 1 MG/ML
INJECTION INTRAMUSCULAR; INTRAVENOUS
Status: DISCONTINUED | OUTPATIENT
Start: 2024-09-18 | End: 2024-09-18

## 2024-09-18 RX ORDER — BUPIVACAINE HYDROCHLORIDE 2.5 MG/ML
INJECTION, SOLUTION EPIDURAL; INFILTRATION; INTRACAUDAL
Status: COMPLETED | OUTPATIENT
Start: 2024-09-18 | End: 2024-09-18

## 2024-09-18 RX ORDER — KETOROLAC TROMETHAMINE 30 MG/ML
INJECTION, SOLUTION INTRAMUSCULAR; INTRAVENOUS
Status: DISCONTINUED | OUTPATIENT
Start: 2024-09-18 | End: 2024-09-18

## 2024-09-18 RX ORDER — ONDANSETRON HYDROCHLORIDE 2 MG/ML
4 INJECTION, SOLUTION INTRAVENOUS EVERY 6 HOURS PRN
Status: DISCONTINUED | OUTPATIENT
Start: 2024-09-18 | End: 2024-09-19 | Stop reason: HOSPADM

## 2024-09-18 RX ORDER — PROPOFOL 10 MG/ML
VIAL (ML) INTRAVENOUS
Status: DISCONTINUED | OUTPATIENT
Start: 2024-09-18 | End: 2024-09-18

## 2024-09-18 RX ORDER — PHENYLEPHRINE HYDROCHLORIDE 10 MG/ML
INJECTION INTRAVENOUS
Status: DISCONTINUED | OUTPATIENT
Start: 2024-09-18 | End: 2024-09-18

## 2024-09-18 RX ORDER — FENTANYL CITRATE 50 UG/ML
INJECTION, SOLUTION INTRAMUSCULAR; INTRAVENOUS
Status: DISCONTINUED | OUTPATIENT
Start: 2024-09-18 | End: 2024-09-18

## 2024-09-18 RX ORDER — ACETAMINOPHEN 10 MG/ML
1000 INJECTION, SOLUTION INTRAVENOUS ONCE
Status: COMPLETED | OUTPATIENT
Start: 2024-09-18 | End: 2024-09-18

## 2024-09-18 RX ORDER — ROCURONIUM BROMIDE 10 MG/ML
INJECTION, SOLUTION INTRAVENOUS
Status: DISCONTINUED | OUTPATIENT
Start: 2024-09-18 | End: 2024-09-18

## 2024-09-18 RX ORDER — LIDOCAINE HYDROCHLORIDE 20 MG/ML
INJECTION, SOLUTION EPIDURAL; INFILTRATION; INTRACAUDAL; PERINEURAL
Status: DISCONTINUED | OUTPATIENT
Start: 2024-09-18 | End: 2024-09-18

## 2024-09-18 RX ORDER — SODIUM CHLORIDE 0.9 % (FLUSH) 0.9 %
3 SYRINGE (ML) INJECTION
Status: DISCONTINUED | OUTPATIENT
Start: 2024-09-18 | End: 2024-09-18

## 2024-09-18 RX ORDER — IBUPROFEN 600 MG/1
600 TABLET ORAL 3 TIMES DAILY
Status: DISCONTINUED | OUTPATIENT
Start: 2024-09-18 | End: 2024-09-19 | Stop reason: HOSPADM

## 2024-09-18 RX ORDER — OXYCODONE HYDROCHLORIDE 5 MG/1
5 TABLET ORAL EVERY 4 HOURS PRN
Status: DISCONTINUED | OUTPATIENT
Start: 2024-09-18 | End: 2024-09-19 | Stop reason: HOSPADM

## 2024-09-18 RX ORDER — DEXAMETHASONE SODIUM PHOSPHATE 4 MG/ML
INJECTION, SOLUTION INTRA-ARTICULAR; INTRALESIONAL; INTRAMUSCULAR; INTRAVENOUS; SOFT TISSUE
Status: DISCONTINUED | OUTPATIENT
Start: 2024-09-18 | End: 2024-09-18

## 2024-09-18 RX ORDER — GLUCAGON 1 MG
1 KIT INJECTION
Status: DISCONTINUED | OUTPATIENT
Start: 2024-09-18 | End: 2024-09-18

## 2024-09-18 RX ADMIN — SUGAMMADEX 400 MG: 100 INJECTION, SOLUTION INTRAVENOUS at 11:09

## 2024-09-18 RX ADMIN — FAMOTIDINE 40 MG: 20 TABLET ORAL at 02:09

## 2024-09-18 RX ADMIN — MIDAZOLAM HYDROCHLORIDE 2 MG: 1 INJECTION INTRAMUSCULAR; INTRAVENOUS at 09:09

## 2024-09-18 RX ADMIN — ONDANSETRON 4 MG: 2 INJECTION INTRAMUSCULAR; INTRAVENOUS at 10:09

## 2024-09-18 RX ADMIN — LIDOCAINE HYDROCHLORIDE 80 MG: 20 INJECTION, SOLUTION EPIDURAL; INFILTRATION; INTRACAUDAL; PERINEURAL at 10:09

## 2024-09-18 RX ADMIN — PHENYLEPHRINE HYDROCHLORIDE 200 MCG: 10 INJECTION INTRAVENOUS at 11:09

## 2024-09-18 RX ADMIN — FENTANYL CITRATE 25 MCG: 50 INJECTION, SOLUTION INTRAMUSCULAR; INTRAVENOUS at 10:09

## 2024-09-18 RX ADMIN — HYDROMORPHONE HYDROCHLORIDE 0.4 MG: 2 INJECTION, SOLUTION INTRAMUSCULAR; INTRAVENOUS; SUBCUTANEOUS at 11:09

## 2024-09-18 RX ADMIN — CEFAZOLIN 2 G: 330 INJECTION, POWDER, FOR SOLUTION INTRAMUSCULAR; INTRAVENOUS at 10:09

## 2024-09-18 RX ADMIN — HYDROMORPHONE HYDROCHLORIDE 0.4 MG: 2 INJECTION, SOLUTION INTRAMUSCULAR; INTRAVENOUS; SUBCUTANEOUS at 12:09

## 2024-09-18 RX ADMIN — SUCRALFATE 1 G: 1 SUSPENSION ORAL at 02:09

## 2024-09-18 RX ADMIN — DEXAMETHASONE SODIUM PHOSPHATE 4 MG: 4 INJECTION, SOLUTION INTRAMUSCULAR; INTRAVENOUS at 10:09

## 2024-09-18 RX ADMIN — OXYCODONE HYDROCHLORIDE 10 MG: 10 TABLET ORAL at 03:09

## 2024-09-18 RX ADMIN — GLYCOPYRROLATE 0.2 MG: 0.2 INJECTION, SOLUTION INTRAMUSCULAR; INTRAVITREAL at 11:09

## 2024-09-18 RX ADMIN — ONDANSETRON 4 MG: 2 INJECTION INTRAMUSCULAR; INTRAVENOUS at 04:09

## 2024-09-18 RX ADMIN — IBUPROFEN 600 MG: 600 TABLET, FILM COATED ORAL at 08:09

## 2024-09-18 RX ADMIN — FENTANYL CITRATE 100 MCG: 50 INJECTION, SOLUTION INTRAMUSCULAR; INTRAVENOUS at 09:09

## 2024-09-18 RX ADMIN — SODIUM CHLORIDE, SODIUM LACTATE, POTASSIUM CHLORIDE, AND CALCIUM CHLORIDE: .6; .31; .03; .02 INJECTION, SOLUTION INTRAVENOUS at 10:09

## 2024-09-18 RX ADMIN — PROPOFOL 20 MG: 10 INJECTION, EMULSION INTRAVENOUS at 11:09

## 2024-09-18 RX ADMIN — ACETAMINOPHEN 1000 MG: 10 INJECTION INTRAVENOUS at 11:09

## 2024-09-18 RX ADMIN — OXYCODONE HYDROCHLORIDE 5 MG: 5 TABLET ORAL at 11:09

## 2024-09-18 RX ADMIN — HYDROCHLOROTHIAZIDE 25 MG: 25 TABLET ORAL at 02:09

## 2024-09-18 RX ADMIN — ALUMINUM HYDROXIDE, MAGNESIUM HYDROXIDE, AND DIMETHICONE 30 ML: 200; 20; 200 SUSPENSION ORAL at 05:09

## 2024-09-18 RX ADMIN — PANTOPRAZOLE SODIUM 40 MG: 40 TABLET, DELAYED RELEASE ORAL at 02:09

## 2024-09-18 RX ADMIN — SUCRALFATE 1 G: 1 SUSPENSION ORAL at 05:09

## 2024-09-18 RX ADMIN — PROPOFOL 200 MG: 10 INJECTION, EMULSION INTRAVENOUS at 10:09

## 2024-09-18 RX ADMIN — ROCURONIUM BROMIDE 20 MG: 10 INJECTION INTRAVENOUS at 10:09

## 2024-09-18 RX ADMIN — ALUMINUM HYDROXIDE, MAGNESIUM HYDROXIDE, AND DIMETHICONE 30 ML: 200; 20; 200 SUSPENSION ORAL at 02:09

## 2024-09-18 RX ADMIN — BUPIVACAINE 20 ML: 13.3 INJECTION, SUSPENSION, LIPOSOMAL INFILTRATION at 09:09

## 2024-09-18 RX ADMIN — KETOROLAC TROMETHAMINE 30 MG: 30 INJECTION, SOLUTION INTRAMUSCULAR; INTRAVENOUS at 11:09

## 2024-09-18 RX ADMIN — BUPIVACAINE HYDROCHLORIDE 40 ML: 2.5 INJECTION, SOLUTION EPIDURAL; INFILTRATION; INTRACAUDAL; PERINEURAL at 09:09

## 2024-09-18 RX ADMIN — ROCURONIUM BROMIDE 50 MG: 10 INJECTION INTRAVENOUS at 10:09

## 2024-09-18 NOTE — ANESTHESIA PREPROCEDURE EVALUATION
09/18/2024  Dina Martínez is a 53 y.o., female.      Pre-op Assessment    I have reviewed the Patient Summary Reports.     I have reviewed the Nursing Notes. I have reviewed the NPO Status.   I have reviewed the Medications.     Review of Systems  Anesthesia Hx:  No problems with previous Anesthesia                Social:  Non-Smoker       Hematology/Oncology:  Hematology Normal   Oncology Normal                                   EENT/Dental:  EENT/Dental Normal           Cardiovascular:     Hypertension, well controlled                                        Pulmonary:        Sleep Apnea, CPAP                Hepatic/GI:     GERD, well controlled             Endocrine:  Diabetes, well controlled         Obesity / BMI > 30  Psych:  Psychiatric Normal                    Physical Exam  General: Cooperative and Alert    Airway:  Mallampati: II   Mouth Opening: Normal  TM Distance: Normal  Tongue: Normal  Neck ROM: Normal ROM    Dental:  Intact        Anesthesia Plan  Type of Anesthesia, risks & benefits discussed:    Anesthesia Type: Gen ETT  Intra-op Monitoring Plan: Standard ASA Monitors  Post Op Pain Control Plan: multimodal analgesia and peripheral nerve block  Induction:  IV  Airway Plan: Video  Informed Consent: Informed consent signed with the Patient and all parties understand the risks and agree with anesthesia plan.  All questions answered.   ASA Score: 3    Ready For Surgery From Anesthesia Perspective.     .

## 2024-09-18 NOTE — ANESTHESIA PROCEDURE NOTES
Intubation    Date/Time: 9/18/2024 10:22 AM    Performed by: Regla Sanz CRNA  Authorized by: Alberto Morales MD    Intubation:     Induction:  Intravenous    Mask Ventilation:  Easy with oral airway    Attempts:  1    Attempted By:  Student    Method of Intubation:  Video laryngoscopy    Blade:  Barker 3    Laryngeal View Grade: Grade I - full view of cords      Difficult Airway Encountered?: No      Complications:  None    Airway Device:  Oral endotracheal tube    Airway Device Size:  7.5    Style/Cuff Inflation:  Cuffed    Inflation Amount (mL):  6    Tube secured:  22    Secured at:  The lips    Placement Verified By:  Capnometry    Complicating Factors:  None    Findings Post-Intubation:  BS equal bilateral and atraumatic/condition of teeth unchanged

## 2024-09-18 NOTE — OP NOTE
Date of surgery: 09/18/2024    Operative Report  Pre-operative diagnosis: acute cholecystitis   Post-operative diagnosis: Same as above    Procedure:  Laparoscopic cholecystectomy    Findings:  Critical view of safety          Surgeon: Soumya Cagle MD   Assistant:  Shana Flores MD    Indication: Ms. Martínez is a 53 year old woman with a history of acute cholecystitis  who is scheduled to undergo laparoscopic cholecystectomy. The benefits, risks, and alternatives were discussed with the patient, they were given the opportunity to ask questions and they elected to proceed with operative intervention after signing written consent.    Procedure:  After pre-operative assessment and review of informed consent, the patient was taken to the operating room and received general anesthesia. Pre-operative antibiotics were administered if indicated and the patient was placed in supine position. The abdomen was prepped and draped in the usual sterile fashion and a timeout was performed according to [facility] Quality and Safety guidelines.      An incision was made at Hansen's point.  A veress needle was inserted and a negative drop test performed.  Insufflation was turned on and pneumoperitoneum achieved.  An incision was made at the umbilicus and an 11 mm trocar inserted in an optiview fashion.  Three additional 5 mm trocars were placed under direct laparoscopic visualization in the right upper quadrant.  A locking grasper was used to elevated the gallbladder.  The cystic duct and cystic artery were dissected out until a critical view of safety was obtained.  2 clips were placed proximally and 1 clip distally on both the cystic duct and cystic artery.  Both structures were transected with scissors.  There was bleeding from the base of the artery that was controlled with an additional clip. The gallbladder was dissected off of the liver bed with hook electrocautery.  The gallbladder was placed in an endocatch bag and  removed.  The liver bed was inspected and hemostasis obtained.  The site was irrigated.  The clip placed for hemostasis was inspected and was in appropriate position.  Hemoblast was placed in the fossa.     The trocar at the umbilicus was removed and the site closed with a stacey souza and a vicryl suture.  The remaining trocars were removed and pneumoperitoneum evacuated.  The incisions were irrigated and skin closed with monocryl.  The wounds were cleaned and dried and dressed with dermabond.       Prior to closure and after final closure instrument, needle, and sponge counts were correct.    The procedure was completed without complication and was well-tolerated. The patient was then brought to the post-anesthesia care unit in stable condition. I was present for the entire operation.    Complications: None  Estimated blood loss: 50 mL  Disposition: PACU    Soumya Cagle MD  Staff Surgeon   Colon & Rectal Surgery

## 2024-09-18 NOTE — PLAN OF CARE
Problem: Adult Inpatient Plan of Care  Goal: Plan of Care Review  Outcome: Progressing  Flowsheets (Taken 9/18/2024 1823)  Plan of Care Reviewed With:   patient   spouse  Goal: Optimal Comfort and Wellbeing  Outcome: Progressing  Intervention: Monitor Pain and Promote Comfort  Flowsheets (Taken 9/18/2024 1823)  Pain Management Interventions:   around-the-clock dosing utilized   care clustered   relaxation techniques promoted   quiet environment facilitated  Intervention: Provide Person-Centered Care  Flowsheets (Taken 9/18/2024 1823)  Trust Relationship/Rapport:   care explained   choices provided   questions answered   questions encouraged   thoughts/feelings acknowledged   emotional support provided   empathic listening provided   reassurance provided     Problem: Diabetes Comorbidity  Goal: Blood Glucose Level Within Targeted Range  Outcome: Progressing  Intervention: Monitor and Manage Glycemia  Flowsheets (Taken 9/18/2024 1823)  Glycemic Management: blood glucose monitored     Problem: Wound  Goal: Optimal Pain Control and Function  Outcome: Progressing  Goal: Skin Health and Integrity  Outcome: Progressing  Goal: Optimal Wound Healing  Outcome: Progressing  Intervention: Promote Wound Healing  Flowsheets (Taken 9/18/2024 1823)  Sleep/Rest Enhancement:   regular sleep/rest pattern promoted   relaxation techniques promoted

## 2024-09-18 NOTE — SUBJECTIVE & OBJECTIVE
Interval History: Mrs Arroyo is 3 hours out of the PACU.  She tolerated a regular diet. Her pain in the abdomen is 7/10. She has not ambulated yet.  Surgical sites closed with no erythema or drainage. BS active.     Review of Systems   Constitutional:  Negative for activity change, appetite change and fever.   HENT:  Negative for congestion.    Eyes:  Negative for visual disturbance.   Respiratory:  Negative for chest tightness and shortness of breath.    Cardiovascular:  Negative for chest pain and leg swelling.   Gastrointestinal:  Positive for abdominal pain and nausea. Negative for abdominal distention, constipation and vomiting.   Endocrine: Negative for polydipsia, polyphagia and polyuria.   Genitourinary:  Negative for difficulty urinating and dysuria.   Musculoskeletal:  Negative for back pain.   Neurological:  Negative for dizziness.   Psychiatric/Behavioral:  Negative for agitation.      Objective:     Vital Signs (Most Recent):  Temp: 97.5 °F (36.4 °C) (09/18/24 1401)  Pulse: 77 (09/18/24 1401)  Resp: 18 (09/18/24 1543)  BP: (!) 109/54 (09/18/24 1401)  SpO2: 99 % (09/18/24 1401) Vital Signs (24h Range):  Temp:  [97.3 °F (36.3 °C)-98.9 °F (37.2 °C)] 97.5 °F (36.4 °C)  Pulse:  [62-83] 77  Resp:  [14-18] 18  SpO2:  [94 %-100 %] 99 %  BP: (109-136)/(54-73) 109/54     Weight: 112.9 kg (248 lb 14.4 oz)  Body mass index is 40.17 kg/m².    Intake/Output Summary (Last 24 hours) at 9/18/2024 1620  Last data filed at 9/18/2024 1448  Gross per 24 hour   Intake 1140 ml   Output 650 ml   Net 490 ml         Physical Exam  Vitals and nursing note reviewed.   HENT:      Mouth/Throat:      Mouth: Mucous membranes are moist.   Eyes:      Pupils: Pupils are equal, round, and reactive to light.   Cardiovascular:      Rate and Rhythm: Normal rate.   Pulmonary:      Effort: Pulmonary effort is normal.   Abdominal:      General: Bowel sounds are normal.      Palpations: Abdomen is soft. There is no mass.      Tenderness: There is  abdominal tenderness. There is no guarding.      Hernia: No hernia is present.   Musculoskeletal:         General: Normal range of motion.      Right lower leg: No edema.      Left lower leg: No edema.   Skin:     General: Skin is warm.      Comments: 3 surgical sites closed, no drainage or erythema   Neurological:      General: No focal deficit present.      Mental Status: She is alert and oriented to person, place, and time.             Significant Labs: All pertinent labs within the past 24 hours have been reviewed.  BMP:   Recent Labs   Lab 09/18/24  0530         K 3.5      CO2 26   BUN 8   CREATININE 0.8   CALCIUM 9.5     CBC:   Recent Labs   Lab 09/16/24  2349 09/18/24  0530   WBC 6.99 4.87   HGB 13.3 13.0   HCT 40.4 39.7    275     CMP:   Recent Labs   Lab 09/16/24  2349 09/17/24  0553 09/18/24  0530    140 141   K 3.8 3.8 3.5    106 106   CO2 26 24 26   * 107 110   BUN 14 10 8   CREATININE 0.8 0.8 0.8   CALCIUM 9.5 9.3 9.5   PROT 7.6 7.4 7.0   ALBUMIN 3.7 3.5 3.4*   BILITOT 0.8 0.6 0.5   ALKPHOS 135 159* 139*   * 339* 93*   * 238* 160*   ANIONGAP 11 10 9       Significant Imaging: I have reviewed all pertinent imaging results/findings within the past 24 hours.

## 2024-09-18 NOTE — HOSPITAL COURSE
Mrs Arroyo was admitted for acute cholecystitis and is post op day 1 laparoscopic cholecystectomy.  She had had a bowle movement and tolerating a regular diet. Her pain is controlled and she is ambulatory.. Mrs Arroyo's surgical incisions are well approximated and without erythema, edema, or drainage. On exam, she has no abdominal distention or shortness of breath. She is cleared by her surgeon for discharge. Mrs Arroyo will be discharged home and will follow up surgery outpatient. Pulmonology referral placed for incidental lung nodule finding on CT a/p. Results discussed with Mrs Arroyo and her wife.

## 2024-09-18 NOTE — ANESTHESIA POSTPROCEDURE EVALUATION
Anesthesia Post Evaluation    Patient: Dina Martínez    Procedure(s) Performed: Procedure(s) (LRB):  CHOLECYSTECTOMY, LAPAROSCOPIC (N/A)    Final Anesthesia Type: general      Patient location during evaluation: PACU  Patient participation: Yes- Able to Participate  Level of consciousness: awake and alert  Post-procedure vital signs: reviewed and stable  Pain management: adequate  Airway patency: patent  BILL mitigation strategies: Extubation while patient is awake  PONV status at discharge: No PONV  Anesthetic complications: no      Cardiovascular status: hemodynamically stable  Respiratory status: unassisted  Hydration status: euvolemic  Follow-up not needed.              Vitals Value Taken Time   /54 09/18/24 1646   Temp 36.4 °C (97.52 °F) 09/18/24 1646   Pulse 77 09/18/24 1646   Resp 13 09/18/24 1646   SpO2 98 % 09/18/24 1646   Vitals shown include unfiled device data.      Event Time   Out of Recovery 09/18/2024 13:41:00         Pain/Barrett Score: Pain Rating Prior to Med Admin: 8 (9/18/2024  3:43 PM)  Pain Rating Post Med Admin: 6 (9/18/2024 12:45 PM)  Barrett Score: 10 (9/18/2024  1:00 PM)

## 2024-09-18 NOTE — OR NURSING
VSS on RA. Pain tolerable per pt. Dressings  C/D/I. PIV saline locked. Meets PACU discharge criteria, [facility]. Updates given to Kahtarine BROOKS. Bio Beat in place.

## 2024-09-18 NOTE — PROGRESS NOTES
[facility] Surg [address] Medicine  Progress Note    Patient Name: Dina Martínez  MRN: [id number]  Patient Class: OP- Observation   Admission Date: 9/16/2024  Length of Stay: 0 days  Attending Physician: Sandy Hercules MD  Primary Care Provider: Pamela Arnold MD        Subjective:     Principal Problem:Calculus of gallbladder with acute cholecystitis without obstruction        HPI:  Mrs Arroyo is a 53-year-old female with history of diabetes, hypertension, and BILL presents complaining of left upper abdominal pain for the past 5 days that radiates to her back and occasionally to her chest. The pain was initially intermittent but she states it has been constant over the past several hours today. She does report associated nausea but no vomiting, fever, chills, urinary symptoms, or changes in bowel movements. She denies any shortness of breath. She denies any similar pain. She has tried Bentyl and Protonix with no relief. These medications were prescribed to her previously for other abdominal issues. He denies any previous abdominal surgeries. She was no history of peptic ulcer disease, gastritis, or pancreatitis. She denies any heavy alcohol use or NSAID use.     Overview/Hospital Course:  Mrs Arroyo was admitted for acute cholecystitis and is post op day 0 laparoscopic cholecystectomy.      Interval History: Mrs Arroyo is 3 hours out of the PACU.  She tolerated a regular diet. Her pain in the abdomen is 7/10. She has not ambulated yet.  Surgical sites closed with no erythema or drainage. BS active.     Review of Systems   Constitutional:  Negative for activity change, appetite change and fever.   HENT:  Negative for congestion.    Eyes:  Negative for visual disturbance.   Respiratory:  Negative for chest tightness and shortness of breath.    Cardiovascular:  Negative for chest pain and leg swelling.   Gastrointestinal:  Positive for abdominal pain and nausea. Negative for abdominal distention,  constipation and vomiting.   Endocrine: Negative for polydipsia, polyphagia and polyuria.   Genitourinary:  Negative for difficulty urinating and dysuria.   Musculoskeletal:  Negative for back pain.   Neurological:  Negative for dizziness.   Psychiatric/Behavioral:  Negative for agitation.      Objective:     Vital Signs (Most Recent):  Temp: 97.5 °F (36.4 °C) (09/18/24 1401)  Pulse: 77 (09/18/24 1401)  Resp: 18 (09/18/24 1543)  BP: (!) 109/54 (09/18/24 1401)  SpO2: 99 % (09/18/24 1401) Vital Signs (24h Range):  Temp:  [97.3 °F (36.3 °C)-98.9 °F (37.2 °C)] 97.5 °F (36.4 °C)  Pulse:  [62-83] 77  Resp:  [14-18] 18  SpO2:  [94 %-100 %] 99 %  BP: (109-136)/(54-73) 109/54     Weight: 112.9 kg (248 lb 14.4 oz)  Body mass index is 40.17 kg/m².    Intake/Output Summary (Last 24 hours) at 9/18/2024 1620  Last data filed at 9/18/2024 1448  Gross per 24 hour   Intake 1140 ml   Output 650 ml   Net 490 ml         Physical Exam  Vitals and nursing note reviewed.   HENT:      Mouth/Throat:      Mouth: Mucous membranes are moist.   Eyes:      Pupils: Pupils are equal, round, and reactive to light.   Cardiovascular:      Rate and Rhythm: Normal rate.   Pulmonary:      Effort: Pulmonary effort is normal.   Abdominal:      General: Bowel sounds are normal.      Palpations: Abdomen is soft. There is no mass.      Tenderness: There is abdominal tenderness. There is no guarding.      Hernia: No hernia is present.   Musculoskeletal:         General: Normal range of motion.      Right lower leg: No edema.      Left lower leg: No edema.   Skin:     General: Skin is warm.      Comments: 3 surgical sites closed, no drainage or erythema   Neurological:      General: No focal deficit present.      Mental Status: She is alert and oriented to person, place, and time.             Significant Labs: All pertinent labs within the past 24 hours have been reviewed.  BMP:   Recent Labs   Lab 09/18/24  0530         K 3.5      CO2 26    BUN 8   CREATININE 0.8   CALCIUM 9.5     CBC:   Recent Labs   Lab 09/16/24  2349 09/18/24  0530   WBC 6.99 4.87   HGB 13.3 13.0   HCT 40.4 39.7    275     CMP:   Recent Labs   Lab 09/16/24  2349 09/17/24  0553 09/18/24  0530    140 141   K 3.8 3.8 3.5    106 106   CO2 26 24 26   * 107 110   BUN 14 10 8   CREATININE 0.8 0.8 0.8   CALCIUM 9.5 9.3 9.5   PROT 7.6 7.4 7.0   ALBUMIN 3.7 3.5 3.4*   BILITOT 0.8 0.6 0.5   ALKPHOS 135 159* 139*   * 339* 93*   * 238* 160*   ANIONGAP 11 10 9       Significant Imaging: I have reviewed all pertinent imaging results/findings within the past 24 hours.    Assessment/Plan:      * Calculus of gallbladder with acute cholecystitis without obstruction  Abdominal pain radiating to back   No N/V/Constipation  Afebrile, No elevated WBCs    - CT A/P:  reports gallbladder wall thickening on US and  - elevated AST/ALT  - General Surgery consulted. Appreciate recommendations including laparoscopic cholecystectomy 9/18    Type 2 diabetes mellitus, without long-term current use of insulin  Monitor glucose AC/HS. Low dose SSI ordered. Patient takes metformin at home.       Obstructive sleep apnea syndrome, severe  CPAP ordered for qhs        VTE Risk Mitigation (From admission, onward)           Ordered     IP VTE HIGH RISK PATIENT  Once         09/17/24 0517     Place sequential compression device  Until discontinued         09/17/24 0517                    Discharge Planning   WALLY: 9/19/2024     Code Status: Full Code   Is the patient medically ready for discharge?:     Reason for patient still in hospital (select all that apply): Patient trending condition and Consult recommendations  Discharge Plan A: Home, Home with family                  Nidhi Mcgill DNP  Department of Hospital Medicine   [facility] - Med Surg ([address])

## 2024-09-18 NOTE — PLAN OF CARE
LMSW met with the patient at the bedside. Patient is alert and oriented with no communication barriers. Prior to admission, the patient is independent. Patient denies the use of HH or DME. Patients PCP is correct on the face sheet. Patient choice pharmacy is bedside. Patient's family will transport the patient home at discharge.    [facility] - Med Surg ([address])  Initial Discharge Assessment       Primary Care Provider: Pamela Arnold MD    Admission Diagnosis: Epigastric abdominal pain [R10.13]  Transaminitis [R74.01]  Calculus of gallbladder without cholecystitis without obstruction [K80.20]  Left lower lobe pulmonary nodule [R91.1]  Left upper quadrant abdominal pain [R10.12]    Admission Date: 9/16/2024  Expected Discharge Date:     Transition of Care Barriers: (P) None    Payor: [employer] / Plan: [employer] [place] / Product Type: Self Funded /     Extended Emergency Contact Information  Primary Emergency Contact: Dang Cantu  Address: [address] [place] [address]  Home Phone: [phone]  Mobile Phone: [phone]  Relation: Spouse    Discharge Plan A: (P) Home, Home with family  Discharge Plan B: (P) Home      [facility] [place]  [address] [facility] [address]  Phone: [phone] Fax: [phone]    [facility]  [address]  [place] [address]  Phone: [phone] Fax: [phone]    [facility] [place] - [address]  [facility] [place] [address]  Phone: [phone] Fax: [phone]      Initial Assessment (most recent)       Adult Discharge Assessment - 09/18/24 0851          Discharge Assessment    Assessment Type Discharge Planning Assessment (P)      Confirmed/corrected address, phone number and insurance Yes (P)      Confirmed Demographics Correct on Facesheet (P)      Source of Information patient;health  record (P)      People in Home spouse (P)      Do you expect to return to your current living situation? Yes (P)      Do you have help at home or someone to help you manage your care at home? Yes (P)      Who are your caregiver(s) and their phone number(s)? Dang Cantu (Spouse)  [phone] (P)      Prior to hospitilization cognitive status: Alert/Oriented;No Deficits (P)      Current cognitive status: Alert/Oriented;No Deficits (P)      Equipment Currently Used at Home CPAP (P)      Readmission within 30 days? No (P)      Patient currently being followed by outpatient case management? No (P)      Do you currently have service(s) that help you manage your care at home? No (P)      Do you take prescription medications? Yes (P)      Do you have prescription coverage? Yes (P)      Do you have any problems affording any of your prescribed medications? No (P)      Is the patient taking medications as prescribed? yes (P)      How do you get to doctors appointments? family or friend will provide;car, drives self (P)      Are you on dialysis? No (P)    Patient is diabetic    Do you take coumadin? No (P)      Discharge Plan A Home;Home with family (P)      Discharge Plan B Home (P)      Discharge Plan discussed with: Patient (P)      Transition of Care Barriers None (P)

## 2024-09-18 NOTE — PROGRESS NOTES
Surgery Inpatient Progress Note    Date: 09/18/2024    Overnight events: Pain is improved from yesterday.  NPO since midnight.     O:   Vitals:    09/18/24 0452   BP: (!) 110/55   Pulse: 75   Resp: 18   Temp: 98.2 °F (36.8 °C)       Physical Exam   General: 53 y.o. female in no distress   Neuro: alert and oriented x 4.  Moves all extremities.     HEENT: normocephalic, atraumatic, PERRL, EOMI   Respiratory: respirations are even and unlabored  Cardiac: regular rate and rhythm  Abdomen: soft, NTND  Extremities: Warm dry and intact  Skin: no rashes     Labs:              Component Ref Range & Units 05:30  (9/18/24) 1 d ago  (9/17/24) 2 d ago  (9/16/24) 1 mo ago  (8/6/24) 6 mo ago  (3/21/24) 8 mo ago  (1/6/24) 10 mo ago  (10/31/23)   Sodium 136 - 145 mmol/L 141 140 139 139 143 136 137   Potassium 3.5 - 5.1 mmol/L 3.5 3.8 CM 3.8 3.7 4.2 3.5 4.0   Chloride 95 - 110 mmol/L 106 106 102 103 104 102 100   CO2 23 - 29 mmol/L 26 24 26 27 26 18 Low  22 Low    Glucose 70 - 110 mg/dL 110 107 119 High  99 104 200 High  128 High    BUN 6 - 20 mg/dL 8 10 14 17 13 13 13   Creatinine 0.5 - 1.4 mg/dL 0.8 0.8 0.8 0.7 0.8 0.9 0.8   Calcium 8.7 - 10.5 mg/dL 9.5 9.3 9.5 9.5 9.7 9.0 10.1   Total Protein 6.0 - 8.4 g/dL 7.0 7.4 7.6 7.7 7.2 7.3 8.4   Albumin 3.5 - 5.2 g/dL 3.4 Low  3.5 3.7 3.6 3.6 3.5 4.3   Total Bilirubin 0.1 - 1.0 mg/dL 0.5 0.6 CM 0.8 CM 0.3 CM 0.3 CM 0.3 CM 0.3 CM   Comment: For infants and newborns, interpretation of results should be based  on gestational age, weight and in agreement with clinical  observations.    Premature Infant recommended reference ranges:  Up to 24 hours.............<8.0 mg/dL  Up to 48 hours............<12.0 mg/dL  3-5 days..................<15.0 mg/dL  6-29 days.................<15.0 mg/dL   Alkaline Phosphatase 55 - 135 U/L 139 High  159 High  135 96 86 75 86   AST 10 - 40 U/L 93 High  339 High  292 High  15 13 17 19   ALT 10 - 44 U/L 160 High  238 High  148 High  17 14 15 19   eGFR >60  mL/min/1.73 m^2 >60 >60 >60 >60.0 >60.0 >60.0 >60   Anion Gap 8 - 16 mmol/L 9 10 11 9 13         Assessment and plan:   Dina Martínez is a 53 y.o. female who presents with acute cholecystitis     - labs reviewed and LFTs improved from admission.  Bilirubin stable.   - To OR today for laparoscopic cholecystectomy.  Risks, benefits and alternatives of the procedure discussed.  Specifically, we discussed risk of bleeding, infection, bile leak with need for further procedures, injury to the common bile duct and changes to bowel habits.  We reviewed post-operative restrictions.   - consent signed and placed in chart       Soumya Cagle MD  Staff Surgeon   Colon & Rectal Surgery

## 2024-09-18 NOTE — ASSESSMENT & PLAN NOTE
Abdominal pain radiating to back   No N/V/Constipation  Afebrile, No elevated WBCs    - CT A/P:  reports gallbladder wall thickening on US and  - elevated AST/ALT  - General Surgery consulted. Appreciate recommendations including laparoscopic cholecystectomy 9/18

## 2024-09-18 NOTE — TRANSFER OF CARE
"Anesthesia Transfer of Care Note    Patient: Dina Martínez    Procedure(s) Performed: Procedure(s) (LRB):  CHOLECYSTECTOMY, LAPAROSCOPIC (N/A)    Patient location: PACU    Anesthesia Type: general    Transport from OR: Transported from OR on 6-10 L/min O2 by face mask with adequate spontaneous ventilation    Post pain: adequate analgesia    Post assessment: no apparent anesthetic complications and tolerated procedure well    Post vital signs: stable    Level of consciousness: awake and alert    Nausea/Vomiting: no nausea/vomiting    Complications: none    Transfer of care protocol was followed      Last vitals: Visit Vitals  BP (!) 109/54   Pulse 75   Temp 36.3 °C (97.3 °F)   Resp 14   Ht 5' 6" (1.676 m)   Wt 112.9 kg (248 lb 14.4 oz)   LMP 10/25/2015   SpO2 99%   Breastfeeding No   BMI 40.17 kg/m²     "

## 2024-09-18 NOTE — ANESTHESIA PROCEDURE NOTES
Peripheral Block    Patient location during procedure: pre-op   Block not for primary anesthetic.  Reason for block: at surgeon's request and post-op pain management   Post-op Pain Location: abdominal wall pain    End time: 9/18/2024 9:48 AM    Staffing  Authorizing Provider: Alberto Morales MD  Performing Provider: Alberto Morales MD    Staffing  Performed by: Alberto Morales MD  Authorized by: Alberto Morales MD    Preanesthetic Checklist  Completed: patient identified, IV checked, site marked, risks and benefits discussed, surgical consent, monitors and equipment checked, pre-op evaluation and timeout performed  Peripheral Block  Patient position: supine  Prep: ChloraPrep  Patient monitoring: cardiac monitor, heart rate, continuous pulse ox, continuous capnometry and frequent blood pressure checks  Block type: TAP  Laterality: right  Injection technique: single shot  Needle  Needle type: Stimuplex   Needle gauge: 21 G  Needle length: 4 in  Needle localization: ultrasound guidance   -ultrasound image captured on disc.  Assessment  Injection assessment: negative aspiration, negative parasthesia and local visualized surrounding nerve  Paresthesia pain: none  Heart rate change: no  Slow fractionated injection: yes  Pain Tolerance: comfortable throughout block and no complaints  Medications:    Medications: bupivacaine (pf) (MARCAINE) injection 0.25% - Perineural   40 mL - 9/18/2024 9:48:00 AM    Additional Notes  VSS.  [facility] RN monitoring vitals throughout procedure.  Patient tolerated procedure well.  Right subcostal done also

## 2024-09-19 ENCOUNTER — PATIENT MESSAGE (OUTPATIENT)
Dept: INTERNAL MEDICINE | Facility: CLINIC | Age: 54
End: 2024-09-19
Payer: COMMERCIAL

## 2024-09-19 VITALS
BODY MASS INDEX: 40 KG/M2 | OXYGEN SATURATION: 97 % | DIASTOLIC BLOOD PRESSURE: 54 MMHG | WEIGHT: 248.88 LBS | TEMPERATURE: 98 F | HEART RATE: 73 BPM | SYSTOLIC BLOOD PRESSURE: 114 MMHG | HEIGHT: 66 IN | RESPIRATION RATE: 18 BRPM

## 2024-09-19 DIAGNOSIS — G47.33 OBSTRUCTIVE SLEEP APNEA: Primary | ICD-10-CM

## 2024-09-19 LAB
ALBUMIN SERPL BCP-MCNC: 3.3 G/DL (ref 3.5–5.2)
ALP SERPL-CCNC: 116 U/L (ref 55–135)
ALT SERPL W/O P-5'-P-CCNC: 115 U/L (ref 10–44)
ANION GAP SERPL CALC-SCNC: 10 MMOL/L (ref 8–16)
AST SERPL-CCNC: 48 U/L (ref 10–40)
BASOPHILS # BLD AUTO: 0.03 K/UL (ref 0–0.2)
BASOPHILS NFR BLD: 0.3 % (ref 0–1.9)
BILIRUB SERPL-MCNC: 0.3 MG/DL (ref 0.1–1)
BUN SERPL-MCNC: 10 MG/DL (ref 6–20)
CALCIUM SERPL-MCNC: 9.4 MG/DL (ref 8.7–10.5)
CHLORIDE SERPL-SCNC: 104 MMOL/L (ref 95–110)
CO2 SERPL-SCNC: 26 MMOL/L (ref 23–29)
CREAT SERPL-MCNC: 0.8 MG/DL (ref 0.5–1.4)
DIFFERENTIAL METHOD BLD: ABNORMAL
EOSINOPHIL # BLD AUTO: 0 K/UL (ref 0–0.5)
EOSINOPHIL NFR BLD: 0.4 % (ref 0–8)
ERYTHROCYTE [DISTWIDTH] IN BLOOD BY AUTOMATED COUNT: 14.5 % (ref 11.5–14.5)
EST. GFR  (NO RACE VARIABLE): >60 ML/MIN/1.73 M^2
GLUCOSE SERPL-MCNC: 98 MG/DL (ref 70–110)
HCT VFR BLD AUTO: 40.9 % (ref 37–48.5)
HGB BLD-MCNC: 13.2 G/DL (ref 12–16)
IMM GRANULOCYTES # BLD AUTO: 0.04 K/UL (ref 0–0.04)
IMM GRANULOCYTES NFR BLD AUTO: 0.4 % (ref 0–0.5)
LYMPHOCYTES # BLD AUTO: 2.9 K/UL (ref 1–4.8)
LYMPHOCYTES NFR BLD: 28.7 % (ref 18–48)
MCH RBC QN AUTO: 26.8 PG (ref 27–31)
MCHC RBC AUTO-ENTMCNC: 32.3 G/DL (ref 32–36)
MCV RBC AUTO: 83 FL (ref 82–98)
MONOCYTES # BLD AUTO: 0.6 K/UL (ref 0.3–1)
MONOCYTES NFR BLD: 6 % (ref 4–15)
NEUTROPHILS # BLD AUTO: 6.5 K/UL (ref 1.8–7.7)
NEUTROPHILS NFR BLD: 64.2 % (ref 38–73)
NRBC BLD-RTO: 0 /100 WBC
PLATELET # BLD AUTO: 277 K/UL (ref 150–450)
PMV BLD AUTO: 10.3 FL (ref 9.2–12.9)
POCT GLUCOSE: 105 MG/DL (ref 70–110)
POCT GLUCOSE: 106 MG/DL (ref 70–110)
POCT GLUCOSE: 126 MG/DL (ref 70–110)
POCT GLUCOSE: 155 MG/DL (ref 70–110)
POTASSIUM SERPL-SCNC: 3.5 MMOL/L (ref 3.5–5.1)
PROT SERPL-MCNC: 6.9 G/DL (ref 6–8.4)
RBC # BLD AUTO: 4.93 M/UL (ref 4–5.4)
SODIUM SERPL-SCNC: 140 MMOL/L (ref 136–145)
WBC # BLD AUTO: 10.13 K/UL (ref 3.9–12.7)

## 2024-09-19 PROCEDURE — 94761 N-INVAS EAR/PLS OXIMETRY MLT: CPT

## 2024-09-19 PROCEDURE — 96361 HYDRATE IV INFUSION ADD-ON: CPT

## 2024-09-19 PROCEDURE — 80053 COMPREHEN METABOLIC PANEL: CPT | Performed by: NURSE PRACTITIONER

## 2024-09-19 PROCEDURE — 36415 COLL VENOUS BLD VENIPUNCTURE: CPT | Performed by: HOSPITALIST

## 2024-09-19 PROCEDURE — 63600175 PHARM REV CODE 636 W HCPCS: Performed by: NURSE PRACTITIONER

## 2024-09-19 PROCEDURE — 25000003 PHARM REV CODE 250: Performed by: NURSE PRACTITIONER

## 2024-09-19 PROCEDURE — 85025 COMPLETE CBC W/AUTO DIFF WBC: CPT | Performed by: HOSPITALIST

## 2024-09-19 PROCEDURE — 25000003 PHARM REV CODE 250: Performed by: SURGERY

## 2024-09-19 PROCEDURE — 25000003 PHARM REV CODE 250: Performed by: EMERGENCY MEDICINE

## 2024-09-19 PROCEDURE — 36415 COLL VENOUS BLD VENIPUNCTURE: CPT | Performed by: NURSE PRACTITIONER

## 2024-09-19 PROCEDURE — G0378 HOSPITAL OBSERVATION PER HR: HCPCS

## 2024-09-19 RX ORDER — IBUPROFEN 600 MG/1
600 TABLET ORAL 3 TIMES DAILY PRN
Qty: 15 TABLET | Refills: 0 | Status: SHIPPED | OUTPATIENT
Start: 2024-09-19 | End: 2024-09-19

## 2024-09-19 RX ORDER — ONDANSETRON 4 MG/1
8 TABLET, ORALLY DISINTEGRATING ORAL EVERY 6 HOURS PRN
Qty: 10 TABLET | Refills: 0 | Status: SHIPPED | OUTPATIENT
Start: 2024-09-19

## 2024-09-19 RX ORDER — ACETAMINOPHEN 325 MG/1
650 TABLET ORAL EVERY 8 HOURS PRN
COMMUNITY
Start: 2024-09-19

## 2024-09-19 RX ORDER — OXYCODONE HYDROCHLORIDE 10 MG/1
10 TABLET ORAL EVERY 6 HOURS PRN
Qty: 8 TABLET | Refills: 0 | Status: SHIPPED | OUTPATIENT
Start: 2024-09-19

## 2024-09-19 RX ORDER — IBUPROFEN 600 MG/1
600 TABLET ORAL 3 TIMES DAILY PRN
Qty: 15 TABLET | Refills: 0 | Status: SHIPPED | OUTPATIENT
Start: 2024-09-19 | End: 2024-09-26 | Stop reason: SDUPTHER

## 2024-09-19 RX ADMIN — ALUMINUM HYDROXIDE, MAGNESIUM HYDROXIDE, AND DIMETHICONE 30 ML: 200; 20; 200 SUSPENSION ORAL at 10:09

## 2024-09-19 RX ADMIN — SUCRALFATE 1 G: 1 SUSPENSION ORAL at 05:09

## 2024-09-19 RX ADMIN — ONDANSETRON 4 MG: 2 INJECTION INTRAMUSCULAR; INTRAVENOUS at 02:09

## 2024-09-19 RX ADMIN — FAMOTIDINE 40 MG: 20 TABLET ORAL at 10:09

## 2024-09-19 RX ADMIN — OXYCODONE HYDROCHLORIDE 10 MG: 10 TABLET ORAL at 09:09

## 2024-09-19 RX ADMIN — ALUMINUM HYDROXIDE, MAGNESIUM HYDROXIDE, AND DIMETHICONE 30 ML: 200; 20; 200 SUSPENSION ORAL at 05:09

## 2024-09-19 RX ADMIN — PANTOPRAZOLE SODIUM 40 MG: 40 TABLET, DELAYED RELEASE ORAL at 10:09

## 2024-09-19 RX ADMIN — IBUPROFEN 600 MG: 600 TABLET, FILM COATED ORAL at 10:09

## 2024-09-19 RX ADMIN — SUCRALFATE 1 G: 1 SUSPENSION ORAL at 12:09

## 2024-09-19 NOTE — DISCHARGE INSTRUCTIONS
Okay to shower, let warm soapy water run over incisions  Diet as tolerated  No driving while taking narcotics  Avoid heavy lifting for 4 wees  Call [phone] for worsening pain, fever > 101, yellowing of the eyes or skin, inability to tolerate oral intake.  Clinic will call to arrange follow up.

## 2024-09-19 NOTE — PLAN OF CARE
Sw [facility] request [facility] on her CPAP.

## 2024-09-19 NOTE — TELEPHONE ENCOUNTER
"Order for CPAP supplies sent by Dr. Arnold 3/25/24  Can you re send for patient?    CPAP/BIPAP SUPPLIES [] (Order [id number])  General Supply  Date and Time: 3/25/2024  9:01 AM Department: [facility] Internal Medicine Rel By/Authorizing: Pamela Arnold MD     Dept Order Information    Date Department   3/25/2024 [facility] Internal Medicine     Order Information    Order Date/Time Release Date/Time Start Date/Time End Date/Time   03/25/24 09:01 AM None 3/25/2024 None     Order Details    Frequency Duration Priority Order Class   None None Routine Clinic Performed     Quantity    Ordering Quantity   1     Quantity    Ordering Quantity Ordering Quantity   1 1     Order Details    Order ID   [id number]     Comments    Mask of patient's choice - full face mask vs nasal pillows                Reprint Order Requisition    CPAP/BIPAP SUPPLIES (Order #[id number]) on 3/25/24             Associated Diagnoses     ICD-10-CM ICD-9-CM   Obstructive sleep apnea  - Primary    G47.33 327.23           CPAP/BIPAP SUPPLIES: Patient Communication     Not Released  Not seen         Order Questions    Question Answer   Length of need (1-99 months): 99   Choose ONE mask type and its corresponding cushions and/or pillows:  Nasal Mask, 1 per 90 days:  Nasal Cushions, (6 per 90 days):  Nasal Pillows, (6 per 90 days)   Choose EITHER Heated or Non-Heated Tubjing  Non-Heated Tubing, 1 per 90 days   All other supplies as needed as listed below:  Headgear, 1 per 180 days     Chin Strap, 1 per 180 days     Disposable Filter, 6 per 90 days     Non-Disposable Filter, 1 per 180 days     Exhalation Port, contact payer for quantity/frequency     Humidifier Chamber, 1 per 180 days                    Process Instructions    Only use the "Resulting Agency" field below if you are sending DME for a patient during a [facility], [facility], or [facility] visit.       Collection " Information                                  Patient Details  MRN:[id number]  Name Legal Sex:  SSStephen Jeffries Female 1970          Address Phone Email Laura   [address] [address] Home: [phone]  Work:  Cell: [phone] [email or web address]          Inpatient Unit Inpatient Room Inpatient Bed    [facility] MEDICAL SURGICAL [address]           PCP Allergies     Pamela Arnold MD No Known Allergies                Primary Visit Coverage    Payer Plan Sponsor Code Group Number Group Name   [employer]EE BENEFIT [employer] [place]  [id number]        Primary Visit Coverage Subscriber    Subscriber ID Subscriber Name Subscriber Address   [id number] STEPHEN HAYES [address] [id number]           Additional Information    Associated Reports   Priority and Order Details           ADT-Related Order Information         Encounter    View Encounter               Order Provider Info        Office phone Pager E-mail   Ordering User  Pamela Arnold MD  [phone] [email or web address]   Authorizing Provider  Pamela Arnold MD  [phone] [email or web address]     CPAP/BIPAP SUPPLIES [[id number]]    Electronically signed by: Pamela Arnold MD on 24 Status: Active   Ordering user: Pamela Arnold MD 24 Authorized by: Pamela Arnold MD   Ordering mode: Standard   Frequency:  24 -     Diagnoses  Obstructive sleep apnea [G47.33]     Questionnaire    Question Answer   Length of need (1-99 months): 99   Choose ONE mask type and its corresponding cushions and/or pillows:  Nasal Mask, 1 per 90 days:  Nasal Cushions, (6 per 90 days):  Nasal Pillows, (6 per 90 days)   Choose EITHER Heated or Non-Heated Tubjing  Non-Heated Tubing, 1 per 90 days   All other supplies as needed as listed below:   Headgear, 1 per 180 days   Chin Strap, 1 per 180 days   Disposable Filter, 6 per 90 days   Non-Disposable Filter, 1 per 180 days   Exhalation Port, contact payer for quantity/frequency   Humidifier Chamber, 1 per 180 days   Order comments: Mask of patient's choice - full face mask vs nasal pillows       Order Diagnosis and CPT Display    Order Diagnosis: Obstructive sleep apnea [G47.33 (ICD-10-CM)] CPT:                  Electronically signed by: Pamela Arnold MD Lic # [id number] NPI: [id number]

## 2024-09-19 NOTE — DISCHARGE SUMMARY
[facility]urg [address] Medicine  Discharge Summary      Patient Name: Dina Martínez  MRN: [id number]  ABIODUN: [id number]  Patient Class: OP- Observation  Admission Date: 9/16/2024  Hospital Length of Stay: 0 days  Discharge Date and Time:  09/19/2024 12:25 PM  Attending Physician: Sandy Hercules MD   Discharging Provider: Nidhi Mcgill DNP  Primary Care Provider: Pamela Arnold MD    Primary Care Team: Networked reference to record PCT     HPI:   Mrs Arroyo is a 53-year-old female with history of diabetes, hypertension, and BILL presents complaining of left upper abdominal pain for the past 5 days that radiates to her back and occasionally to her chest. The pain was initially intermittent but she states it has been constant over the past several hours today. She does report associated nausea but no vomiting, fever, chills, urinary symptoms, or changes in bowel movements. She denies any shortness of breath. She denies any similar pain. She has tried Bentyl and Protonix with no relief. These medications were prescribed to her previously for other abdominal issues. He denies any previous abdominal surgeries. She was no history of peptic ulcer disease, gastritis, or pancreatitis. She denies any heavy alcohol use or NSAID use.     Procedure(s) (LRB):  CHOLECYSTECTOMY, LAPAROSCOPIC (N/A)      Hospital Course:   Mrs Arroyo was admitted for acute cholecystitis and is post op day 1 laparoscopic cholecystectomy.  She had had a bowle movement and tolerating a regular diet. Her pain is controlled and she is ambulatory.. Mrs Arroyo's surgical incisions are well approximated and without erythema, edema, or drainage. On exam, she has no abdominal distention or shortness of breath. She is cleared by her surgeon for discharge. Mrs Arroyo will be discharged home and will follow up surgery outpatient. Pulmonology referral placed for incidental lung nodule finding on CT a/p. Results discussed with Mrs Arroyo  and her wife.      Goals of Care Treatment Preferences:  Code Status: Full Code         Consults:   Consults (From admission, onward)          Status Ordering Provider     Inpatient consult to General surgery  Once        Provider:  (Not yet assigned)    SHAY Palencia            No new Assessment & Plan notes have been filed under this hospital service since the last note was generated.  Service: Hospital Medicine    Final Active Diagnoses:    Diagnosis Date Noted POA    PRINCIPAL PROBLEM:  Calculus of gallbladder with acute cholecystitis without obstruction [K80.00] 09/17/2024 Yes    Type 2 diabetes mellitus, without long-term current use of insulin [E11.9] 06/13/2017 Yes    Obstructive sleep apnea syndrome, severe [G47.33]  Yes      Problems Resolved During this Admission:       Discharged Condition: good    Disposition: Home or Self Care    Follow Up:   Follow-up Information       Dme, [facility].    Specialties: PRINCESS Provider, Orthotics  Why: Press 3 for CPAP needs  Contact information:  [address]  [address]  [place] [address]  [phone]                           Patient Instructions:      Ambulatory referral/consult to Pulmonology   Standing Status: Future   Referral Priority: Routine Referral Type: Consultation   Referral Reason: Specialty Services Required   Requested Specialty: Pulmonary Disease   Number of Visits Requested: 1     Diet diabetic     Notify your health care provider if you experience any of the following:  temperature >100.4     Notify your health care provider if you experience any of the following:  persistent nausea and vomiting or diarrhea     Notify your health care provider if you experience any of the following:  redness, tenderness, or signs of infection (pain, swelling, redness, odor or green/yellow discharge around incision site)     Activity as tolerated       Significant Diagnostic Studies: N/A    Pending Diagnostic Studies:       Procedure Component  Value Units Date/Time    Specimen to Pathology, Surgery General Surgery [[id number]] Collected: 09/18/24 1131    Order Status: Sent Lab Status: In process Updated: 09/18/24 1457    Specimen: Tissue            Medications:  Reconciled Home Medications:      Medication List        START taking these medications      acetaminophen 325 MG tablet  Commonly known as: TYLENOL  Take 2 tablets (650 mg total) by mouth every 8 (eight) hours as needed for Temperature greater than or Pain (or equal to 101 degree F).     ibuprofen 600 MG tablet  Commonly known as: ADVIL,MOTRIN  Take 1 tablet (600 mg total) by mouth 3 (three) times daily as needed for Pain (4-6/10 on pain scale).     oxyCODONE 10 mg Tab immediate release tablet  Commonly known as: ROXICODONE  Take 1 tablet (10 mg total) by mouth every 6 (six) hours as needed for Pain (severe pain 7-10/10).            CHANGE how you take these medications      * ondansetron 4 MG Tbdl  Commonly known as: ZOFRAN-ODT  Dissolve 1 tablet (4 mg total) by mouth every 8 (eight) hours as needed.  What changed: Another medication with the same name was added. Make sure you understand how and when to take each.     * ondansetron 4 MG Tbdl  Commonly known as: ZOFRAN-ODT  dissolve 2 tablets (8 mg total) by mouth every 6 (six) hours as needed (nausea/vomitting).  What changed: You were already taking a medication with the same name, and this prescription was added. Make sure you understand how and when to take each.           * This list has 2 medication(s) that are the same as other medications prescribed for you. Read the directions carefully, and ask your doctor or other care provider to review them with you.                CONTINUE taking these medications      dicyclomine 10 MG capsule  Commonly known as: BENTYL  Take 1 capsule (10 mg total) by mouth 3 times daily before meals as needed (abdominal pain).     ergocalciferol 50,000 unit Cap  Commonly known as: VITAMIN D2  Take 1 capsule  (50,000 Units total) by mouth once a week.     famotidine 40 MG tablet  Commonly known as: PEPCID  Take 1 tablet (40 mg total) by mouth once daily.     fluticasone propionate 50 mcg/actuation nasal spray  Commonly known as: FLONASE  Instill 2 sprays (100 mcg total) by Each Nostril route daily as needed (nasal congestion).     metFORMIN 500 MG ER 24hr tablet  Commonly known as: GLUCOPHAGE-XR  Take 4 tablets (2,000 mg total) by mouth after dinner.     multivitamin capsule  Take 1 capsule by mouth once daily.     OZEMPIC 0.25 mg or 0.5 mg (2 mg/3 mL) pen injector  Generic drug: semaglutide  0.25 mg once weekly for 4 weeks then 0.5 mg once weekly     pantoprazole 40 MG tablet  Commonly known as: PROTONIX  Take 1 tablet (40 mg total) by mouth once daily.     triamterene-hydrochlorothiazide 37.5-25 mg 37.5-25 mg per tablet  Commonly known as: MAXZIDE-25  Take 1 tablet by mouth once daily.     valACYclovir 1000 MG tablet  Commonly known as: VALTREX  Take 2 tablets by mouth twice a day x 1 day for outbreak              Indwelling Lines/Drains at time of discharge:   Lines/Drains/Airways       None                   Time spent on the discharge of patient: 30 minutes         Nidhi Mcgill DNP  Department of Hospital Medicine  [facility] ([address])

## 2024-09-19 NOTE — PLAN OF CARE
Free from falls, injury, or skin breakdown this hospital admission. Pt eager & in agreement w/ DC. VU of DC instructions and the need to attend f/u appointments--paperwork passed & explained-- other scripts called to pharmacy per MD. IV removed w/ cath tip intact, WNL. Voiding, ambulating, & tolerating PO well. Incision WNL. To be DCd home w/ family--will be escorted downstairs via  transport team once dressed, ready & ride arrives. Pt discharged in no distress.

## 2024-09-19 NOTE — PROGRESS NOTES
Surgery Inpatient Progress Note    Date: 09/19/2024    Overnight events: Tolerating diet.  Post-operative pain manageable with oral pain medication.      O:   Vitals:    09/19/24 1005   BP: (!) 101/52   Pulse: 75   Resp: 15   Temp: 97.7 °F (36.5 °C)       Physical Exam   Gen: well developed female, NAD  HEENT: normocephalic, atraumatic, PERRL, EOMI   CV: RRR, no murmurs  Resp: nonlabored, CTAB   Abd: soft, incisions well approximated, appropriately tender to palpation   MSK: no gross deformities     Component Ref Range & Units 04:33  (9/19/24) 1 d ago  (9/18/24) 2 d ago  (9/17/24) 3 d ago  (9/16/24) 1 mo ago  (8/6/24) 6 mo ago  (3/21/24) 8 mo ago  (1/6/24)   Sodium 136 - 145 mmol/L 140 141 140 139 139 143 136   Potassium 3.5 - 5.1 mmol/L 3.5 3.5 3.8 CM 3.8 3.7 4.2 3.5   Chloride 95 - 110 mmol/L 104 106 106 102 103 104 102   CO2 23 - 29 mmol/L 26 26 24 26 27 26 18 Low    Glucose 70 - 110 mg/dL 98 110 107 119 High  99 104 200 High    BUN 6 - 20 mg/dL 10 8 10 14 17 13 13   Creatinine 0.5 - 1.4 mg/dL 0.8 0.8 0.8 0.8 0.7 0.8 0.9   Calcium 8.7 - 10.5 mg/dL 9.4 9.5 9.3 9.5 9.5 9.7 9.0   Total Protein 6.0 - 8.4 g/dL 6.9 7.0 7.4 7.6 7.7 7.2 7.3   Albumin 3.5 - 5.2 g/dL 3.3 Low  3.4 Low  3.5 3.7 3.6 3.6 3.5   Total Bilirubin 0.1 - 1.0 mg/dL 0.3 0.5 CM 0.6 CM 0.8 CM 0.3 CM 0.3 CM 0.3 CM   Comment: For infants and newborns, interpretation of results should be based  on gestational age, weight and in agreement with clinical  observations.    Premature Infant recommended reference ranges:  Up to 24 hours.............<8.0 mg/dL  Up to 48 hours............<12.0 mg/dL  3-5 days..................<15.0 mg/dL  6-29 days.................<15.0 mg/dL   Alkaline Phosphatase 55 - 135 U/L 116 139 High  159 High  135 96 86 75   AST 10 - 40 U/L 48 High  93 High  339 High  292 High  15 13 17   ALT 10 - 44 U/L 115 High  160 High  238 High  148 High  17 14 15   eGFR >60 mL/min/1.73 m^2 >60 >60 >60 >60 >60.0 >60.0 >60.0   Anion Gap 8 - 16 mmol/L  10 9 10 11        Assessment and plan:   Dina Martínez is a 53 y.o. female who presents with acute cholecystitis, POD 1 s/p laparoscopic cholecystectomy    - labs reviewed, bilirubin stable and LFTs downtrending.   - patient doing well and is appropriate for discharge    Okay to shower, let warm soapy water run over incisions  Diet as tolerated  No driving while taking narcotics  Avoid heavy lifting for 4 wees  Call [phone] for worsening pain, fever > 101, yellowing of the eyes or skin, inability to tolerate oral intake.  Clinic will call to arrange follow up.         Soumya Cagle MD  Staff Surgeon   Colon & Rectal Surgery

## 2024-09-19 NOTE — PLAN OF CARE
LMSW met with patient via bedside. Patient's preferred pharmacy is bedside. Patient's family will provide transportation home up discharge.     [facility] ([address])  Discharge Final Note    Primary Care Provider: Pamela Arnold MD    Expected Discharge Date: 9/19/2024    Final Discharge Note (most recent)       Final Note - 09/19/24 1215          Final Note    Assessment Type Final Discharge Note (P)      Anticipated Discharge Disposition Home or Self Care (P)      Hospital Resources/Appts/Education Provided Provided patient/caregiver with written discharge plan information;Appointments scheduled and added to AVS (P)         Post-Acute Status    Post-Acute Authorization Other (P)      Other Status No Post-Acute Service Needs (P)      Discharge Delays None known at this time (P)                    Contact Info       [facility]   Specialty: DME Provider, Orthotics    [address] [place] [id number]   Phone: [phone]       Next Steps: Follow up    Instructions: Press 3 for CPAP needs

## 2024-09-20 ENCOUNTER — TELEPHONE (OUTPATIENT)
Dept: SLEEP MEDICINE | Facility: CLINIC | Age: 54
End: 2024-09-20
Payer: COMMERCIAL

## 2024-09-20 ENCOUNTER — PATIENT OUTREACH (OUTPATIENT)
Dept: ADMINISTRATIVE | Facility: CLINIC | Age: 54
End: 2024-09-20
Payer: COMMERCIAL

## 2024-09-20 LAB
FINAL PATHOLOGIC DIAGNOSIS: NORMAL
GROSS: NORMAL
Lab: NORMAL

## 2024-09-20 NOTE — TELEPHONE ENCOUNTER
Wrote an order for cpap supplies and machine. She will need to contact [facility]    If she needs to be seen in sleep disorders clinic, I have placed a referral

## 2024-09-25 ENCOUNTER — OFFICE VISIT (OUTPATIENT)
Dept: INTERNAL MEDICINE | Facility: CLINIC | Age: 54
End: 2024-09-25
Payer: COMMERCIAL

## 2024-09-25 ENCOUNTER — TELEPHONE (OUTPATIENT)
Dept: SURGERY | Facility: CLINIC | Age: 54
End: 2024-09-25
Payer: COMMERCIAL

## 2024-09-25 ENCOUNTER — LAB VISIT (OUTPATIENT)
Dept: LAB | Facility: HOSPITAL | Age: 54
End: 2024-09-25
Attending: INTERNAL MEDICINE
Payer: COMMERCIAL

## 2024-09-25 VITALS
HEART RATE: 70 BPM | DIASTOLIC BLOOD PRESSURE: 76 MMHG | SYSTOLIC BLOOD PRESSURE: 118 MMHG | WEIGHT: 257.5 LBS | BODY MASS INDEX: 41.38 KG/M2 | HEIGHT: 66 IN | OXYGEN SATURATION: 96 %

## 2024-09-25 DIAGNOSIS — R10.11 RUQ PAIN: ICD-10-CM

## 2024-09-25 DIAGNOSIS — R10.11 RUQ PAIN: Primary | ICD-10-CM

## 2024-09-25 DIAGNOSIS — R79.89 ELEVATED LFTS: ICD-10-CM

## 2024-09-25 DIAGNOSIS — K59.00 CONSTIPATION, UNSPECIFIED CONSTIPATION TYPE: ICD-10-CM

## 2024-09-25 DIAGNOSIS — K76.0 FATTY LIVER: ICD-10-CM

## 2024-09-25 DIAGNOSIS — Z90.49 STATUS POST CHOLECYSTECTOMY: ICD-10-CM

## 2024-09-25 LAB
ALBUMIN SERPL BCP-MCNC: 3.4 G/DL (ref 3.5–5.2)
ALP SERPL-CCNC: 104 U/L (ref 55–135)
ALT SERPL W/O P-5'-P-CCNC: 23 U/L (ref 10–44)
ANION GAP SERPL CALC-SCNC: 11 MMOL/L (ref 8–16)
AST SERPL-CCNC: 13 U/L (ref 10–40)
BASOPHILS # BLD AUTO: 0.04 K/UL (ref 0–0.2)
BASOPHILS NFR BLD: 0.5 % (ref 0–1.9)
BILIRUB SERPL-MCNC: 0.3 MG/DL (ref 0.1–1)
BUN SERPL-MCNC: 10 MG/DL (ref 6–20)
CALCIUM SERPL-MCNC: 10.3 MG/DL (ref 8.7–10.5)
CHLORIDE SERPL-SCNC: 107 MMOL/L (ref 95–110)
CO2 SERPL-SCNC: 24 MMOL/L (ref 23–29)
CREAT SERPL-MCNC: 0.8 MG/DL (ref 0.5–1.4)
DIFFERENTIAL METHOD BLD: ABNORMAL
EOSINOPHIL # BLD AUTO: 0.2 K/UL (ref 0–0.5)
EOSINOPHIL NFR BLD: 2.6 % (ref 0–8)
ERYTHROCYTE [DISTWIDTH] IN BLOOD BY AUTOMATED COUNT: 15 % (ref 11.5–14.5)
EST. GFR  (NO RACE VARIABLE): >60 ML/MIN/1.73 M^2
GLUCOSE SERPL-MCNC: 96 MG/DL (ref 70–110)
HCT VFR BLD AUTO: 42.7 % (ref 37–48.5)
HGB BLD-MCNC: 12.8 G/DL (ref 12–16)
IMM GRANULOCYTES # BLD AUTO: 0.03 K/UL (ref 0–0.04)
IMM GRANULOCYTES NFR BLD AUTO: 0.4 % (ref 0–0.5)
LYMPHOCYTES # BLD AUTO: 2 K/UL (ref 1–4.8)
LYMPHOCYTES NFR BLD: 27.9 % (ref 18–48)
MCH RBC QN AUTO: 25.9 PG (ref 27–31)
MCHC RBC AUTO-ENTMCNC: 30 G/DL (ref 32–36)
MCV RBC AUTO: 86 FL (ref 82–98)
MONOCYTES # BLD AUTO: 0.5 K/UL (ref 0.3–1)
MONOCYTES NFR BLD: 6.4 % (ref 4–15)
NEUTROPHILS # BLD AUTO: 4.5 K/UL (ref 1.8–7.7)
NEUTROPHILS NFR BLD: 62.2 % (ref 38–73)
NRBC BLD-RTO: 0 /100 WBC
PLATELET # BLD AUTO: 271 K/UL (ref 150–450)
PMV BLD AUTO: 11.4 FL (ref 9.2–12.9)
POTASSIUM SERPL-SCNC: 4.4 MMOL/L (ref 3.5–5.1)
PROT SERPL-MCNC: 7.5 G/DL (ref 6–8.4)
RBC # BLD AUTO: 4.94 M/UL (ref 4–5.4)
SODIUM SERPL-SCNC: 142 MMOL/L (ref 136–145)
WBC # BLD AUTO: 7.31 K/UL (ref 3.9–12.7)

## 2024-09-25 PROCEDURE — 99214 OFFICE O/P EST MOD 30 MIN: CPT | Mod: S$GLB,,, | Performed by: INTERNAL MEDICINE

## 2024-09-25 PROCEDURE — 85025 COMPLETE CBC W/AUTO DIFF WBC: CPT | Performed by: INTERNAL MEDICINE

## 2024-09-25 PROCEDURE — 3008F BODY MASS INDEX DOCD: CPT | Mod: CPTII,S$GLB,, | Performed by: INTERNAL MEDICINE

## 2024-09-25 PROCEDURE — 80053 COMPREHEN METABOLIC PANEL: CPT | Performed by: INTERNAL MEDICINE

## 2024-09-25 PROCEDURE — 99999 PR PBB SHADOW E&M-EST. PATIENT-LVL IV: CPT | Mod: PBBFAC,,, | Performed by: INTERNAL MEDICINE

## 2024-09-25 PROCEDURE — 3074F SYST BP LT 130 MM HG: CPT | Mod: CPTII,S$GLB,, | Performed by: INTERNAL MEDICINE

## 2024-09-25 PROCEDURE — 36415 COLL VENOUS BLD VENIPUNCTURE: CPT | Performed by: INTERNAL MEDICINE

## 2024-09-25 PROCEDURE — 1160F RVW MEDS BY RX/DR IN RCRD: CPT | Mod: CPTII,S$GLB,, | Performed by: INTERNAL MEDICINE

## 2024-09-25 PROCEDURE — 1159F MED LIST DOCD IN RCRD: CPT | Mod: CPTII,S$GLB,, | Performed by: INTERNAL MEDICINE

## 2024-09-25 PROCEDURE — 3044F HG A1C LEVEL LT 7.0%: CPT | Mod: CPTII,S$GLB,, | Performed by: INTERNAL MEDICINE

## 2024-09-25 PROCEDURE — 3078F DIAST BP <80 MM HG: CPT | Mod: CPTII,S$GLB,, | Performed by: INTERNAL MEDICINE

## 2024-09-25 NOTE — PROGRESS NOTES
Subjective     Patient ID: Dina Martínez is a 54 y.o. female.    Chief Complaint: Follow-up    Follow-up  Associated symptoms include abdominal pain. Pertinent negatives include no fever.     Cholecystectomy on 9/18 .     She was feeling fine til yday when she developed RUQ pain - a cramp, worse with inspiration.  Lasted all day and night.  Better today.  Different than pre-surgical pain. 5-6/10 .    Was constipated yday but has had BM's x 2 since.  She took tylenol and ibuprofen yday.  She only took oxycodone x 1 post op.  Nausea yday only, no vomiting.  No fever.  No sob.    Htn  BILL - machine not working correctly.    Labs reviewed.  LFTS elevated preop, decreasing at discharge.  Abd US:  Equivocal hepatic steatosis.   Review of Systems   Constitutional:  Negative for fever.   Gastrointestinal:  Positive for abdominal pain.          Objective     Physical Exam  Constitutional:       General: She is not in acute distress.     Appearance: She is well-developed. She is not ill-appearing, toxic-appearing or diaphoretic.   Eyes:      General: No scleral icterus.  Neck:      Thyroid: No thyromegaly.      Vascular: No JVD.   Cardiovascular:      Rate and Rhythm: Normal rate and regular rhythm.      Heart sounds: Normal heart sounds.   Pulmonary:      Effort: Pulmonary effort is normal. No respiratory distress.      Breath sounds: Normal breath sounds. No stridor. No wheezing, rhonchi or rales.   Abdominal:      General: There is no distension.      Palpations: Abdomen is soft. There is no mass.      Tenderness: There is no abdominal tenderness. There is no guarding or rebound.      Hernia: No hernia is present.   Musculoskeletal:      Right lower leg: No edema.      Left lower leg: No edema.   Skin:     Findings: No erythema (at surgical sites).   Neurological:      Mental Status: She is alert and oriented to person, place, and time.      Cranial Nerves: No cranial nerve deficit.   Psychiatric:         Mood and  Affect: Mood normal.         Behavior: Behavior normal.         Thought Content: Thought content normal.         Judgment: Judgment normal.            Assessment and Plan     1. RUQ pain  -     CBC Auto Differential; Future; Expected date: 09/25/2024  -     Comprehensive Metabolic Panel; Future; Expected date: 09/25/2024    2. Status post cholecystectomy    3. Fatty liver    4. Elevated LFTs    5. Constipation, unspecified constipation type    Abd soft today, and pain has improved.      Constipation may have been contributing.    US if no better.  ED if pain severe.         No follow-ups on file.

## 2024-09-25 NOTE — TELEPHONE ENCOUNTER
Pt called stating that she has been having a nagging, constant pain on her right side since her sx.  Pt is also experiencing cramping at times and nausea.  RN asked pt if she has been taking her pain meds ATC.  Pt is taking tylenol and ibuprofen, but she has not taken any Oxy since POD#1.  Pt also ran out of ibuprofen today and needs a refill.  RN asked pt about her diet and if she has been passing gas.  Pt stated that she was constipated previously, but now her bowels are moving without issues.  RN will message Dr. Cagle to let her know about the pain, but also encouraged pt to try and take her Oxy and see if it helps.  Pt stated that she does not like taking the Oxy so she stopped.  RN suggested that pt take a Zofran 30min before taking her Oxy and to try taking her Oxy before bed to see if it helps her to get some rest.  Pt will also send a picture of her incision so that we can make sure it is not infected.  Appropriate s/s of infection reviewed with pt.   Pt verbalized understanding to all.

## 2024-09-26 ENCOUNTER — PATIENT MESSAGE (OUTPATIENT)
Dept: SURGERY | Facility: CLINIC | Age: 54
End: 2024-09-26
Payer: COMMERCIAL

## 2024-09-26 DIAGNOSIS — G89.18 POST-OP PAIN: Primary | ICD-10-CM

## 2024-09-26 NOTE — TELEPHONE ENCOUNTER
RN called pt to let her know that Dr. Cagle reviewed her lab work that she had drawn with her PCP and everything looked normal.  Dr. Cagle stated that she is expecting pt to have a bit of pain right now with only being 1 week out from sx and with having 4 incisions to her right side.  RN asked pt to get on a pain med schedule ATC, continue to move about appropriately, eat more protein and less gassy foods and to drink lots of water and take her stool softeners.  Pt verbalized understanding to all and will contact us soon if she wan not begin to feel better.

## 2024-09-27 RX ORDER — IBUPROFEN 600 MG/1
600 TABLET ORAL 3 TIMES DAILY PRN
Qty: 15 TABLET | Refills: 0 | Status: SHIPPED | OUTPATIENT
Start: 2024-09-27

## 2024-09-30 ENCOUNTER — OFFICE VISIT (OUTPATIENT)
Dept: SLEEP MEDICINE | Facility: CLINIC | Age: 54
End: 2024-09-30
Payer: COMMERCIAL

## 2024-09-30 VITALS
HEIGHT: 66 IN | BODY MASS INDEX: 41.26 KG/M2 | WEIGHT: 256.75 LBS | HEART RATE: 80 BPM | DIASTOLIC BLOOD PRESSURE: 80 MMHG | SYSTOLIC BLOOD PRESSURE: 121 MMHG

## 2024-09-30 DIAGNOSIS — G47.33 OBSTRUCTIVE SLEEP APNEA: ICD-10-CM

## 2024-09-30 PROCEDURE — 1160F RVW MEDS BY RX/DR IN RCRD: CPT | Mod: CPTII,S$GLB,, | Performed by: NURSE PRACTITIONER

## 2024-09-30 PROCEDURE — 99214 OFFICE O/P EST MOD 30 MIN: CPT | Mod: S$GLB,,, | Performed by: NURSE PRACTITIONER

## 2024-09-30 PROCEDURE — 1159F MED LIST DOCD IN RCRD: CPT | Mod: CPTII,S$GLB,, | Performed by: NURSE PRACTITIONER

## 2024-09-30 PROCEDURE — 99999 PR PBB SHADOW E&M-EST. PATIENT-LVL IV: CPT | Mod: PBBFAC,,, | Performed by: NURSE PRACTITIONER

## 2024-09-30 PROCEDURE — 3079F DIAST BP 80-89 MM HG: CPT | Mod: CPTII,S$GLB,, | Performed by: NURSE PRACTITIONER

## 2024-09-30 PROCEDURE — 3008F BODY MASS INDEX DOCD: CPT | Mod: CPTII,S$GLB,, | Performed by: NURSE PRACTITIONER

## 2024-09-30 PROCEDURE — 3044F HG A1C LEVEL LT 7.0%: CPT | Mod: CPTII,S$GLB,, | Performed by: NURSE PRACTITIONER

## 2024-09-30 PROCEDURE — 3074F SYST BP LT 130 MM HG: CPT | Mod: CPTII,S$GLB,, | Performed by: NURSE PRACTITIONER

## 2024-09-30 NOTE — PROGRESS NOTES
ESTABLISHED PATIENT VISIT    Dina Martínez  is a pleasant 54 y.o. female established with the [facility]    Here today for:  follow-up/supplies    Since last visit:   See assessment below      Past Medical History:   Diagnosis Date    Diabetes mellitus     Hypertension     Obstructive sleep apnea syndrome, severe      Patient Active Problem List   Diagnosis    Family history of colon cancer    S/P TONYA-BSO    Sleep apnea    BILL (obstructive sleep apnea)    Vitamin D deficiency    Type 2 diabetes mellitus, without long-term current use of insulin    Epigastric pain    Gastroesophageal reflux disease without esophagitis    Personal history of colonic polyps    Essential hypertension, benign    Obstructive sleep apnea syndrome, severe    Posterior tibial tendonitis, left    Calculus of gallbladder with acute cholecystitis without obstruction       Current Outpatient Medications:     ergocalciferol (VITAMIN D2) 50,000 unit Cap, Take 1 capsule (50,000 Units total) by mouth once a week., Disp: 12 capsule, Rfl: 4    famotidine (PEPCID) 40 MG tablet, Take 1 tablet (40 mg total) by mouth once daily., Disp: 90 tablet, Rfl: 4    fluticasone propionate (FLONASE) 50 mcg/actuation nasal spray, Instill 2 sprays (100 mcg total) by Each Nostril route daily as needed (nasal congestion)., Disp: 16 g, Rfl: 0    ibuprofen (ADVIL,MOTRIN) 600 MG tablet, Take 1 tablet (600 mg total) by mouth 3 (three) times daily as needed for Pain (4-6/10 on pain scale)., Disp: 15 tablet, Rfl: 0    metFORMIN (GLUCOPHAGE-XR) 500 MG ER 24hr tablet, Take 4 tablets (2,000 mg total) by mouth after dinner., Disp: 360 tablet, Rfl: 4    multivitamin capsule, Take 1 capsule by mouth once daily., Disp: , Rfl:     ondansetron (ZOFRAN-ODT) 4 MG TbDL, Dissolve 1 tablet (4 mg total) by mouth every 8 (eight) hours as needed., Disp: 30 tablet, Rfl: 2    ondansetron (ZOFRAN-ODT) 4 MG TbDL, dissolve 2 tablets (8 mg total) by mouth every 6 (six) hours as  needed (nausea/vomitting)., Disp: 10 tablet, Rfl: 0    pantoprazole (PROTONIX) 40 MG tablet, Take 1 tablet (40 mg total) by mouth once daily., Disp: 90 tablet, Rfl: 3    semaglutide (OZEMPIC) 0.25 mg or 0.5 mg (2 mg/3 mL) pen injector, 0.25 mg once weekly for 4 weeks then 0.5 mg once weekly, Disp: 3 mL, Rfl: 12    triamterene-hydrochlorothiazide 37.5-25 mg (MAXZIDE-25) 37.5-25 mg per tablet, Take 1 tablet by mouth once daily., Disp: 90 tablet, Rfl: 4    valACYclovir (VALTREX) 1000 MG tablet, Take 2 tablets by mouth twice a day x 1 day for outbreak, Disp: 30 tablet, Rfl: 0    acetaminophen (TYLENOL) 325 MG tablet, Take 2 tablets (650 mg total) by mouth every 8 (eight) hours as needed for Temperature greater than or Pain (or equal to 101 degree F). (Patient not taking: Reported on 9/30/2024), Disp: , Rfl:     dicyclomine (BENTYL) 10 MG capsule, Take 1 capsule (10 mg total) by mouth 3 times daily before meals as needed (abdominal pain). (Patient not taking: Reported on 9/30/2024), Disp: 90 capsule, Rfl: 3    oxyCODONE (ROXICODONE) 10 mg Tab immediate release tablet, Take 1 tablet (10 mg total) by mouth every 6 (six) hours as needed for Pain (severe pain 7-10/10). (Patient not taking: Reported on 9/30/2024), Disp: 8 tablet, Rfl: 0       Vitals:    09/30/24 1058   Weight: 116.4 kg (256 lb 11.6 oz)     Physical Exam:    GEN:   Well-appearing  Psych:  Appropriate affect, demonstrates insight  SKIN:  No rash on the face or bridge of the nose      LABS:   Lab Results   Component Value Date    HGB 12.8 09/25/2024    CO2 24 09/25/2024         RECORDS REVIEWED:        ASSESSMENT    Sig PMH:  PROBLEM DESCRIPTION/ Sx on Presentation Interval Hx STATUS PLAN      BILL   Presentation:     Has dreamstation 2    PAP history   Dx Study HST 10.27.21 - AHI 25, RDI 36   Mask Nasal mask   DME HME   My Air    CPAP age 2022   PAP altn    Benefits    PROBS          SLEEP SCHEDULE   Duration    Wind- down    Envmnt    CBTi    Meds prior    Meds  now    Bed Time 9PM   Lights out    Latency 30 min   Arousals 3   Back to sleep 5 min   Stim. ctrl    Wake time 430AM (work), 730AM (off)   Caffeine    Naps 0   Nocturia 3   Work               Since last visit:          Was diagnosed with BILL in late 2021 and used her CPAP every day and was benefiting from it..  For the last year, she had gradually stopped - but started again while hospitalized after recent surgery and found that she slept much better with the cpap.    Has restarted using it 2 weeks ago, but found that it was giving her dry mouth and sore throat.  Machine interrogation shows 100% mask fit and 90% pressure of 11.  Recommended pt increase the humidifier setting and showed her how to do this.         likely controlled     PAP PLAN   E min 5 cwp    I max 11 cwp    PS/epr    RAMP 5 x 30 min   Other    Altn.                The patient is using and benefitting from PAP therapy when they have proper supplies         Daytime Sx     ESS 10/24 on intake          partially controlled   -continue treatment of BILL as above     Nocturia     x 3 per sleep period    stable          RTC:  yearly or sooner if problems arise

## 2024-10-18 ENCOUNTER — TELEPHONE (OUTPATIENT)
Dept: SURGERY | Facility: CLINIC | Age: 54
End: 2024-10-18
Payer: COMMERCIAL

## 2024-10-18 ENCOUNTER — OFFICE VISIT (OUTPATIENT)
Dept: SURGERY | Facility: CLINIC | Age: 54
End: 2024-10-18
Payer: COMMERCIAL

## 2024-10-18 VITALS
RESPIRATION RATE: 19 BRPM | WEIGHT: 259.81 LBS | BODY MASS INDEX: 41.76 KG/M2 | DIASTOLIC BLOOD PRESSURE: 64 MMHG | HEART RATE: 76 BPM | OXYGEN SATURATION: 100 % | HEIGHT: 66 IN | SYSTOLIC BLOOD PRESSURE: 132 MMHG

## 2024-10-18 DIAGNOSIS — G89.18 POST-OP PAIN: Primary | ICD-10-CM

## 2024-10-18 DIAGNOSIS — K80.00 CALCULUS OF GALLBLADDER WITH ACUTE CHOLECYSTITIS WITHOUT OBSTRUCTION: ICD-10-CM

## 2024-10-18 PROCEDURE — 99999 PR PBB SHADOW E&M-EST. PATIENT-LVL III: CPT | Mod: PBBFAC,,, | Performed by: SURGERY

## 2024-10-18 NOTE — PROGRESS NOTES
Colon & Rectal Surgery Clinic Follow Up    HPI:   Dina Martínez is a 54 y.o. female who presents for follow up after laparoscopic cholecystectomy for cholecystitis on 9/18/24     Interval history:   Healing well.  Upper abdominal incisions well healed, umbilical incision with mild skin separation and fibrinous drainage.  Eating well, does report some food triggers with fecal urgency.      Objective:   Vitals:    10/18/24 1021   BP: 132/64   Pulse: 76   Resp: 19        Physical Exam   Gen: well developed female, NAD  HEENT: normocephalic, atraumatic, PERRL, EOMI   CV: RRR, no murmurs  Resp: nonlabored, CTAB   Abd: soft, incisions   Msk: no gross deformities, no cyanosis or edema     Value: Gallbladder, cholecystectomy:  Mild chronic cholecystitis.  Cholesterolosis.  Cholelithiasis.         Assessment and Plan:   Dina Martínez  is a 54 y.o. female who presents for follow up of cholecystitis     - patient doing well s/p cholecystectomy, umbilical incision with superficial dehiscence.    - pathology reviewed and benign, post discharge CMP reviewed with normal bilirubin and LFTs   - will try compression shorts/abdominal binder to assist with abdominal discomfort, okay for vaseline or aquaphor to incision   - will message when pain is tolerable to return to work to RTW without restrictions       Soumya Cagle MD  Staff Surgeon   Colon & Rectal Surgery

## 2024-10-18 NOTE — TELEPHONE ENCOUNTER
----- Message from Nahomi sent at 10/18/2024 11:24 AM CDT -----  Regarding: paperwork  Contact: @[phone]  Pt called in regards to her update [facility] paperwork and her return to work on 10/28  .....Please call and adv @[phone]

## 2024-10-18 NOTE — TELEPHONE ENCOUNTER
Spoke with pt regarding RTW date on 10/28. Offered to send RTW letter to portal while waiting for [facility] paperwork to be processed. Pt agreed to receive via portal while [facility] is being updated. No further questions or concerns at this time.        ----- Message from CECILIA Linares sent at 10/18/2024 11:44 AM CDT -----  NERY, she called [facility] to update RTW date to 10/28, so they might send something to us or Dr Cagle to sign?

## 2024-10-18 NOTE — TELEPHONE ENCOUNTER
Spoke with patient, states that she called [facility] to update her RTW date to 10/28 so the [facility] office may be reaching out to Dr Cagle's office for her to sign something.

## 2024-10-28 ENCOUNTER — TELEPHONE (OUTPATIENT)
Dept: SURGERY | Facility: CLINIC | Age: 54
End: 2024-10-28
Payer: COMMERCIAL

## 2024-10-28 ENCOUNTER — E-VISIT (OUTPATIENT)
Dept: INTERNAL MEDICINE | Facility: CLINIC | Age: 54
End: 2024-10-28
Payer: COMMERCIAL

## 2024-10-28 DIAGNOSIS — M54.9 BACK PAIN, UNSPECIFIED BACK LOCATION, UNSPECIFIED BACK PAIN LATERALITY, UNSPECIFIED CHRONICITY: Primary | ICD-10-CM

## 2024-10-28 RX ORDER — IBUPROFEN 800 MG/1
800 TABLET ORAL 3 TIMES DAILY PRN
Qty: 30 TABLET | Refills: 1 | Status: SHIPPED | OUTPATIENT
Start: 2024-10-28

## 2024-10-28 RX ORDER — TIZANIDINE 4 MG/1
4 TABLET ORAL EVERY 8 HOURS PRN
Qty: 40 TABLET | Refills: 3 | Status: SHIPPED | OUTPATIENT
Start: 2024-10-28

## 2024-11-01 ENCOUNTER — OFFICE VISIT (OUTPATIENT)
Dept: ORTHOPEDICS | Facility: CLINIC | Age: 54
End: 2024-11-01
Payer: COMMERCIAL

## 2024-11-01 VITALS
WEIGHT: 259.5 LBS | DIASTOLIC BLOOD PRESSURE: 84 MMHG | HEIGHT: 66 IN | SYSTOLIC BLOOD PRESSURE: 134 MMHG | HEART RATE: 86 BPM | BODY MASS INDEX: 41.71 KG/M2

## 2024-11-01 DIAGNOSIS — M17.11 PRIMARY OSTEOARTHRITIS OF RIGHT KNEE: Primary | ICD-10-CM

## 2024-11-01 PROCEDURE — 99999 PR PBB SHADOW E&M-EST. PATIENT-LVL IV: CPT | Mod: PBBFAC,,,

## 2024-11-08 ENCOUNTER — OFFICE VISIT (OUTPATIENT)
Dept: ORTHOPEDICS | Facility: CLINIC | Age: 54
End: 2024-11-08
Payer: COMMERCIAL

## 2024-11-08 VITALS
HEIGHT: 66 IN | DIASTOLIC BLOOD PRESSURE: 85 MMHG | SYSTOLIC BLOOD PRESSURE: 144 MMHG | WEIGHT: 259.5 LBS | HEART RATE: 91 BPM | BODY MASS INDEX: 41.71 KG/M2

## 2024-11-08 DIAGNOSIS — M17.11 PRIMARY OSTEOARTHRITIS OF RIGHT KNEE: Primary | ICD-10-CM

## 2024-11-08 PROCEDURE — 99999 PR PBB SHADOW E&M-EST. PATIENT-LVL IV: CPT | Mod: PBBFAC,,,

## 2024-11-08 NOTE — PROCEDURES
Large Joint Aspiration/Injection: R knee    Date/Time: 11/8/2024 1:00 PM    Performed by: Gloria Mobley PA-C  Authorized by: Gloria Mobley PA-C    Consent Done?:  Yes (Verbal)  Indications:  Pain and arthritis  Site marked: the procedure site was marked    Timeout: prior to procedure the correct patient, procedure, and site was verified    Prep: patient was prepped and draped in usual sterile fashion    Local anesthetic:  Topical anesthetic    Details:  Needle Size:  22 G  Ultrasonic Guidance for needle placement?: No    Approach:  Anterolateral  Location:  Knee  Site:  R knee  Medications:  20 mg sodium hyaluronate (EUFLEXXA) 10 mg/mL injection ([place] formul[place] preferred)  Patient tolerance:  Patient tolerated the procedure well with no immediate complications

## 2024-11-08 NOTE — PROGRESS NOTES
Dina Martínez is here for follow up of right knee arthritis. Pt is requesting Euflexxa series injections #2 of 3.  [facility] reviewed per encounter record. Has failed other conservative modalities including NSAIDS, activity modification, weight loss.    The prior shot was tolerated well.    PHYSICAL EXAMINATION:     General: The patient is alert and oriented x 3. Mood is pleasant.   Observation of ears, eyes and nose reveals no gross abnormalities. No   labored breathing observed.     No signs of infection or adverse reaction to knee.    Medical Necessity for viscosupplementation use: After thorough evaluation of the patient, I have determined that viscosupplementation treatment is medically necessary. The patient has painful degenerative joint disease (DJD) of the knee(s) with failure of conservative treatments including lifestyle modifications and rehabilitation exercises. Oral analgesics including NSAIDs have not adequately controlled the patient's symptoms. There is radiographic evidence of Kellgren-Donal grade II (or greater) osteoarthritic (OA) changes, or if lack of radiographic evidence, there is arthroscopic or other evidence of chondrosis of the knee(s).     Injection Procedure  A time out was performed, including verification of patient ID, procedure, site and side, availability of information and equipment, review of safety issues, and agreement with consent, the procedure site was marked.    After time out was performed, the patient was prepped aseptically with chloraprep. A diagnostic and therapeutic injection of 2cc Euflexxa was given under sterile technique using a 22g x 1.5 needle from the anterolateral  aspect of the right Knee Joint in the sitting  position.      Dina Martínez had no adverse reactions to the medication. Pain decreased. She was instructed to apply ice to the joint for 20 minutes and avoid strenuous activities for 24-36 hours following the injection. She was warned of  possible blood sugar and/or blood pressure changes during that time. Following that time, she can resume regular activities.    She was reminded to call the clinic immediately for any adverse side effects as explained in clinic today.    RTC to see Gloria Mobley PA-C for Euflexxa series injections #3 of 3.    All of the patient's questions were answered and the patient will contact us if they have any questions or concerns in the interim.

## 2024-11-15 ENCOUNTER — PATIENT MESSAGE (OUTPATIENT)
Dept: ORTHOPEDICS | Facility: CLINIC | Age: 54
End: 2024-11-15

## 2024-11-15 ENCOUNTER — TELEPHONE (OUTPATIENT)
Dept: ORTHOPEDICS | Facility: CLINIC | Age: 54
End: 2024-11-15

## 2024-11-15 ENCOUNTER — OFFICE VISIT (OUTPATIENT)
Dept: ORTHOPEDICS | Facility: CLINIC | Age: 54
End: 2024-11-15
Payer: COMMERCIAL

## 2024-11-15 VITALS
HEART RATE: 86 BPM | DIASTOLIC BLOOD PRESSURE: 83 MMHG | SYSTOLIC BLOOD PRESSURE: 128 MMHG | WEIGHT: 257.81 LBS | BODY MASS INDEX: 41.64 KG/M2

## 2024-11-15 DIAGNOSIS — M17.11 PRIMARY OSTEOARTHRITIS OF RIGHT KNEE: Primary | ICD-10-CM

## 2024-11-15 PROCEDURE — 99999 PR PBB SHADOW E&M-EST. PATIENT-LVL III: CPT | Mod: PBBFAC,,,

## 2024-11-15 NOTE — PROGRESS NOTES
Dina Martínez is here for follow up of right knee arthritis. Pt is requesting Euflexxa series injections #3 of 3.  [facility]. Has failed other conservative modalities including NSAIDS, activity modification, weight loss.    The prior shot was tolerated well.    PHYSICAL EXAMINATION:     General: The patient is alert and oriented x 3. Mood is pleasant.   Observation of ears, eyes and nose reveals no gross abnormalities. No   labored breathing observed.     No signs of infection or adverse reaction to knee.    Medical Necessity for viscosupplementation use: After thorough evaluation of the patient, I have determined that viscosupplementation treatment is medically necessary. The patient has painful degenerative joint disease (DJD) of the knee(s) with failure of conservative treatments including lifestyle modifications and rehabilitation exercises. Oral analgesics including NSAIDs have not adequately controlled the patient's symptoms. There is radiographic evidence of Kellgren-Donal grade II (or greater) osteoarthritic (OA) changes, or if lack of radiographic evidence, there is arthroscopic or other evidence of chondrosis of the knee(s).     Injection Procedure  A time out was performed, including verification of patient ID, procedure, site and side, availability of information and equipment, review of safety issues, and agreement with consent, the procedure site was marked.    After time out was performed, the patient was prepped aseptically with chloraprep. A diagnostic and therapeutic injection of 2cc Euflexxa was given under sterile technique using a 22g x 1.5 needle from the anterolateral  aspect of the right Knee Joint in the sitting  position.      Dina Martínez had no adverse reactions to the medication. Pain decreased. She was instructed to apply ice to the joint for 20 minutes and avoid strenuous activities for 24-36 hours following the injection. She was warned of  possible blood sugar and/or blood pressure changes during that time. Following that time, she can resume regular activities.    She was reminded to call the clinic immediately for any adverse side effects as explained in clinic today.    RTC to see Gloria Mobley PA-C as needed. If no improvement, may consider follow up with surgeon to discuss TKA.    All of the patient's questions were answered and the patient will contact us if they have any questions or concerns in the interim.

## 2024-11-15 NOTE — PROCEDURES
Large Joint Aspiration/Injection: R knee    Date/Time: 11/15/2024 1:00 PM    Performed by: Gloria Mobley PA-C  Authorized by: Gloria Mobley PA-C    Consent Done?:  Yes (Verbal)  Indications:  Pain and arthritis  Site marked: the procedure site was marked    Timeout: prior to procedure the correct patient, procedure, and site was verified    Prep: patient was prepped and draped in usual sterile fashion    Local anesthetic:  Topical anesthetic    Details:  Needle Size:  22 G  Ultrasonic Guidance for needle placement?: No    Approach:  Anterolateral  Location:  Knee  Site:  R knee  Medications:  20 mg sodium hyaluronate (EUFLEXXA) 10 mg/mL injection ([place] formul[place] preferred)  Patient tolerance:  Patient tolerated the procedure well with no immediate complications

## 2024-11-20 ENCOUNTER — OFFICE VISIT (OUTPATIENT)
Dept: PULMONOLOGY | Facility: CLINIC | Age: 54
End: 2024-11-20
Payer: COMMERCIAL

## 2024-11-20 VITALS
BODY MASS INDEX: 41.73 KG/M2 | DIASTOLIC BLOOD PRESSURE: 76 MMHG | OXYGEN SATURATION: 97 % | SYSTOLIC BLOOD PRESSURE: 124 MMHG | WEIGHT: 265.88 LBS | HEIGHT: 67 IN | HEART RATE: 83 BPM

## 2024-11-20 DIAGNOSIS — R91.1 SOLITARY PULMONARY NODULE: Primary | ICD-10-CM

## 2024-11-20 DIAGNOSIS — R91.1 LEFT LOWER LOBE PULMONARY NODULE: ICD-10-CM

## 2024-11-20 PROCEDURE — 99999 PR PBB SHADOW E&M-EST. PATIENT-LVL V: CPT | Mod: PBBFAC,,, | Performed by: INTERNAL MEDICINE

## 2024-11-20 PROCEDURE — 3078F DIAST BP <80 MM HG: CPT | Mod: CPTII,S$GLB,, | Performed by: INTERNAL MEDICINE

## 2024-11-20 PROCEDURE — 3044F HG A1C LEVEL LT 7.0%: CPT | Mod: CPTII,S$GLB,, | Performed by: INTERNAL MEDICINE

## 2024-11-20 PROCEDURE — 3074F SYST BP LT 130 MM HG: CPT | Mod: CPTII,S$GLB,, | Performed by: INTERNAL MEDICINE

## 2024-11-20 PROCEDURE — 3008F BODY MASS INDEX DOCD: CPT | Mod: CPTII,S$GLB,, | Performed by: INTERNAL MEDICINE

## 2024-11-20 PROCEDURE — 1159F MED LIST DOCD IN RCRD: CPT | Mod: CPTII,S$GLB,, | Performed by: INTERNAL MEDICINE

## 2024-11-20 PROCEDURE — 1160F RVW MEDS BY RX/DR IN RCRD: CPT | Mod: CPTII,S$GLB,, | Performed by: INTERNAL MEDICINE

## 2024-11-20 PROCEDURE — 99203 OFFICE O/P NEW LOW 30 MIN: CPT | Mod: S$GLB,,, | Performed by: INTERNAL MEDICINE

## 2024-11-20 NOTE — PROGRESS NOTES
"Subjective:     Reason for visit:   Lung nodule    Patient ID:  Dina Martínez is a 54 y.o. female    History:   Ms. Martínez is a 54-year-old with GERD, diabetes, hypertension that presents for evaluation of a lung nodule.  She was having some gallbladder issues and had a CT of her abdomen that revealed a left lower lobe nodule.  Radiology read it as a stable nodule but she has never had a CT scan of her chest.  She did have a CT scan of her abdomen in 2023 but no mentioned of a nodule in those reports.  She has no personal history of lung disease only occasional smoking in high school but nothing since then.  No family history of lung disease or lung cancer.  She works as a  with no known exposures.        Objective:     Vitals:    11/20/24 1346   BP: 124/76   Pulse: 83   SpO2: 97%   Weight: 120.6 kg (265 lb 14 oz)   Height: 5' 7" (1.702 m)         Physical Exam  Vitals reviewed.   Constitutional:       General: She is not in acute distress.     Appearance: She is not diaphoretic.   HENT:      Head: Normocephalic and atraumatic.      Right Ear: External ear normal.      Left Ear: External ear normal.   Eyes:      Conjunctiva/sclera: Conjunctivae normal.      Pupils: Pupils are equal, round, and reactive to light.   Neck:      Trachea: No tracheal deviation.   Cardiovascular:      Rate and Rhythm: Normal rate and regular rhythm.      Heart sounds: Normal heart sounds. No murmur heard.  Pulmonary:      Effort: Pulmonary effort is normal. No respiratory distress.      Breath sounds: Normal breath sounds. No stridor. No wheezing or rales.   Abdominal:      General: Bowel sounds are normal. There is no distension.      Palpations: Abdomen is soft.      Tenderness: There is no abdominal tenderness.   Musculoskeletal:         General: Normal range of motion.      Cervical back: Normal range of motion and neck supple.   Skin:     General: Skin is warm and dry.      Findings: No erythema.   Neurological: "      Mental Status: She is alert and oriented to person, place, and time.      Gait: Gait is intact.   Psychiatric:         Mood and Affect: Mood and affect normal.         Cognition and Memory: Memory normal.         Judgment: Judgment normal.          Personal Diagnostic Review and Interpretation   Reviewed most recent CT scan very small nodule with no concerning features          Assessment & Plan:       Problem List Items Addressed This Visit          Pulmonary    Left lower lobe pulmonary nodule    Current Assessment & Plan       Incidentally found left lower lobe nodule that is 3 mm and seems to be unchanged from previous however it is hard to tell.    She is low risk but we will obtain a CT chest to get a view of her entire chest and follow-up this 1 nodule.    If it is stable and there was nothing else I identified no other follow-up is needed.          Other Visit Diagnoses       Solitary pulmonary nodule    -  Primary    Relevant Orders    CT Chest Without Contrast             RETURN TO CLINIC based on results       Portions of the record may have been created with voice-recognition software. Occasional wrong-word or sound-a-like substitutions may have occurred due to the inherent limitations of voice-recognition software. Read the chart carefully and recognize, using context, where substitutions have occurred.

## 2024-11-20 NOTE — ASSESSMENT & PLAN NOTE
Incidentally found left lower lobe nodule that is 3 mm and seems to be unchanged from previous however it is hard to tell.    She is low risk but we will obtain a CT chest to get a view of her entire chest and follow-up this 1 nodule.    If it is stable and there was nothing else I identified no other follow-up is needed.

## 2024-11-27 ENCOUNTER — TELEPHONE (OUTPATIENT)
Dept: ORTHOPEDICS | Facility: CLINIC | Age: 54
End: 2024-11-27
Payer: COMMERCIAL

## 2024-11-27 NOTE — TELEPHONE ENCOUNTER
"----- Message from Minda sent at 11/27/2024 10:28 AM CST -----  Regarding: pt advice  Contact: [phone]  .Name Of Caller: Self     Contact Preference?: [phone]     What is the nature of the call?: pt calling in regards to confirming status of order that was supposedly be be sent for pt knee brace. Psl call           Additional Notes:  "Thank you for all that you do for our patients"  "

## 2024-12-09 ENCOUNTER — PATIENT MESSAGE (OUTPATIENT)
Dept: PULMONOLOGY | Facility: CLINIC | Age: 54
End: 2024-12-09

## 2024-12-09 ENCOUNTER — HOSPITAL ENCOUNTER (OUTPATIENT)
Dept: RADIOLOGY | Facility: HOSPITAL | Age: 54
Discharge: HOME | End: 2024-12-09
Attending: INTERNAL MEDICINE
Payer: COMMERCIAL

## 2024-12-09 DIAGNOSIS — R91.1 SOLITARY PULMONARY NODULE: ICD-10-CM

## 2024-12-09 PROCEDURE — 71250 CT THORAX DX C-: CPT | Mod: 26,,, | Performed by: RADIOLOGY

## 2024-12-09 PROCEDURE — 71250 CT THORAX DX C-: CPT | Mod: TC

## 2024-12-23 ENCOUNTER — PATIENT MESSAGE (OUTPATIENT)
Dept: INTERNAL MEDICINE | Facility: CLINIC | Age: 54
End: 2024-12-23
Payer: COMMERCIAL

## 2025-01-21 ENCOUNTER — PATIENT MESSAGE (OUTPATIENT)
Dept: INTERNAL MEDICINE | Facility: CLINIC | Age: 55
End: 2025-01-21
Payer: COMMERCIAL

## 2025-01-22 ENCOUNTER — OFFICE VISIT (OUTPATIENT)
Dept: INTERNAL MEDICINE | Facility: CLINIC | Age: 55
End: 2025-01-22
Payer: COMMERCIAL

## 2025-01-22 DIAGNOSIS — E55.9 VITAMIN D DEFICIENCY DISEASE: ICD-10-CM

## 2025-01-22 DIAGNOSIS — E66.01 SEVERE OBESITY: ICD-10-CM

## 2025-01-22 DIAGNOSIS — M17.0 PRIMARY OSTEOARTHRITIS OF BOTH KNEES: ICD-10-CM

## 2025-01-22 DIAGNOSIS — E53.8 VITAMIN B12 DEFICIENCY: ICD-10-CM

## 2025-01-22 DIAGNOSIS — Z12.31 SCREENING MAMMOGRAM FOR BREAST CANCER: ICD-10-CM

## 2025-01-22 DIAGNOSIS — I10 ESSENTIAL HYPERTENSION: Primary | ICD-10-CM

## 2025-01-22 DIAGNOSIS — E11.9 TYPE 2 DIABETES MELLITUS WITHOUT COMPLICATION, WITHOUT LONG-TERM CURRENT USE OF INSULIN: ICD-10-CM

## 2025-01-22 RX ORDER — PANTOPRAZOLE SODIUM 40 MG/1
40 TABLET, DELAYED RELEASE ORAL 2 TIMES DAILY
Qty: 180 TABLET | Refills: 3 | Status: SHIPPED | OUTPATIENT
Start: 2025-01-22 | End: 2026-01-22

## 2025-01-22 RX ORDER — GABAPENTIN 300 MG/1
300 CAPSULE ORAL NIGHTLY
Qty: 30 CAPSULE | Refills: 4 | Status: SHIPPED | OUTPATIENT
Start: 2025-01-22 | End: 2026-01-22

## 2025-01-22 RX ORDER — DICYCLOMINE HYDROCHLORIDE 10 MG/1
10 CAPSULE ORAL
Qty: 90 CAPSULE | Refills: 3 | Status: SHIPPED | OUTPATIENT
Start: 2025-01-22

## 2025-01-22 RX ORDER — SEMAGLUTIDE 0.68 MG/ML
INJECTION, SOLUTION SUBCUTANEOUS
Qty: 3 ML | Refills: 12 | Status: SHIPPED | OUTPATIENT
Start: 2025-01-22

## 2025-01-22 NOTE — PATIENT INSTRUCTIONS
Pantoprazole 40 mg twice daily for reflux    Gabapentin 300 mg nightly for knee pain.     Tylenol 500 mg 2 tablets three times daily as needed for knee pain.

## 2025-01-22 NOTE — PROGRESS NOTES
"Chief Complaint: Follow up of multiple issues     HPI:THis is a 53 year old woman who presnets for above      She is no longer taking Ozempic due to nausea, vomiting, abdominal pain, constipation.  She is taking metformin ER 2 tablets once daily.      Urinary urgency is controlled with scheduled toileting.       She continues to have right knee pain. She has trouble climbing steps.  The right knee keeps her awake at night. She has diclofenac (has not been takign due to stomach)  -She has had 3 injections in her right knee in November without relief of her knee pain.  She takes ibuprofen 800 mg twice daily as needed which helps.      She has occasional foot pain.  She has the correct shoes with inserts and feet are doing well. She is not taking gabapentin routinely She does her exercises at home. She saw Dr Triplett in orthoepdics for her foot pain. She had a MRI on 3/4/21."The MRI of the left hindfoot reveals significant tenosynovitis with fluid around the posterior tibial tendon and some mild tendinosis near the insertion of the posterior tibial tendon".  She was put in a boot and has weaned out of the boot.        She gets fever blisters once a year and valtrex 1000 2 tablets twice daily for only one day which works.  No fever blisters     She has sleep apnea. HEr sleep study 8/1/16 she has severe obstructive sleep apnea. She has a CPAP machine - she has been wearing the CPAP more- 3 times a week - wears 6-7 hours at night.  The CPAP machine helps. She is wearing the CPAP machine more      Her blood pressure is controlled on triamterene hct 37.5/25 one tablet daily.  NO chest pain, shortness of breath, paplitations.      She is taking vitamin D 50,000 units once a week     Mood is doing ok. No anxiety or depression      She is working nights at [employer] for [employer].              Past Medical History:   Diagnosis Date    Diabetes mellitus      Hypertension      Obstructive sleep apnea syndrome, severe   "                      Past Surgical History:   Procedure Laterality Date    COLONOSCOPY N/A 8/22/2018     Procedure: COLONOSCOPY;  Surgeon: Bianca Cooney MD;  Location: [facility];  Service: Endoscopy;  Laterality: N/A;    ENDOMETRIAL ABLATION        ESOPHAGOGASTRODUODENOSCOPY N/A 6/27/2018     Procedure: ESOPHAGOGASTRODUODENOSCOPY (EGD);  Surgeon: Bianca Cooney MD;  Location: [facility] ENDO;  Service: Endoscopy;  Laterality: N/A;    TOTAL ABDOMINAL HYSTERECTOMY W/ BILATERAL SALPINGOOPHORECTOMY   11/12/2015      Social History                   Socioeconomic History    Marital status:        Spouse name: Not on file    Number of children: Not on file    Years of education: Not on file    Highest education level: Not on file   Occupational History    Occupation: security   Social Needs    Financial resource strain: Not hard at all    Food insecurity       Worry: Never true       Inability: Never true    Transportation needs       Medical: No       Non-medical: No   Tobacco Use    Smoking status: Never Smoker    Smokeless tobacco: Never Used   Substance and Sexual Activity    Alcohol use: Yes       Alcohol/week: 1.0 standard drinks       Types: 1 Cans of beer per week       Frequency: Monthly or less       Drinks per session: 1 or 2       Binge frequency: Never       Comment: 1-2 drinks a week    Drug use: No    Sexual activity: Never       Birth control/protection: None   Lifestyle    Physical activity       Days per week: Not on file       Minutes per session: Not on file    Stress: Not on file   Relationships    Social connections       Talks on phone: Once a week       Gets together: Never       Attends Gnosticism service: Never       Active member of club or organization: No       Attends meetings of clubs or organizations: Never       Relationship status: Living with partner   Other Topics Concern    Are you pregnant or think you may be? Not Asked    Breast-feeding Not Asked   Social History Narrative     Not on file                   Family Status   Relation Name Status    Father       Mother   Alive    MGM       MGF       PGM       PGF       Brother   Alive    Neg Hx   (Not Specified)               Meds and allergies: updated on Epic                Physical exam:          General: alert, oriented x 3, no apparent distress.  Affect normal  HEENT: Conjunctivae: anicteric,  Neck: supple,  Resp: effort normal  Speech normal      Labs 25 reviewed           Assessment/Plan:      Knee pain - gabapentin 300 mg nightly. Get brace for the knee.  Discussed geniculate nerve ablation.  If pain persists, may need to be evaluated for knee replacement  GERD - increase panotprazole 40 mg twice daily  Left Foot pain and ankle pain -improved  HTN - controlled  Diabetes . Controlled. Continue metformin  mg 2 daily. Get back on Ozempic 0.25 mg weekly.   Morbid obesity - Ozempic 0.25mg once weekly  Vitamin D deficiency - on replacement.   Colonoscopy due 2018 - normal  - father  of colon cancer at age 74.  Colonoscopy 23 - 4 small polyps- hyperplastic - due   EGD normal 18  MMG      Follow up in 4 months, [place] if issues    Answers submitted by the patient for this visit:  Review of Systems Questionnaire (Submitted on 2025)  activity change: No  unexpected weight change: No  neck pain: No  hearing loss: No  rhinorrhea: No  trouble swallowing: No  eye discharge: No  visual disturbance: No  chest tightness: No  wheezing: No  chest pain: No  palpitations: No  blood in stool: No  constipation: No  vomiting: No  diarrhea: No  polydipsia: No  polyuria: No  difficulty urinating: No  hematuria: No  menstrual problem: No  dysuria: No  joint swelling: No  arthralgias: Yes  headaches: No  weakness: No  confusion: No  dysphoric mood: No

## 2025-03-26 DIAGNOSIS — I10 ESSENTIAL HYPERTENSION: ICD-10-CM

## 2025-03-26 RX ORDER — TRIAMTERENE/HYDROCHLOROTHIAZID 37.5-25 MG
1 TABLET ORAL DAILY
Qty: 90 TABLET | Refills: 2 | Status: SHIPPED | OUTPATIENT
Start: 2025-03-26

## 2025-03-26 RX ORDER — IBUPROFEN 800 MG/1
800 TABLET ORAL 3 TIMES DAILY PRN
Qty: 30 TABLET | Refills: 1 | Status: SHIPPED | OUTPATIENT
Start: 2025-03-26

## 2025-03-26 NOTE — TELEPHONE ENCOUNTER
Refill Decision Note   Dina Martínez  is requesting a refill authorization.  Brief Assessment and Rationale for Refill:  Approve     Medication Therapy Plan:         Comments:     Note composed:3:18 PM 03/26/2025

## 2025-03-26 NOTE — TELEPHONE ENCOUNTER
Care Due:                  Date            Visit Type   Department     Provider  --------------------------------------------------------------------------------                                ESTABLISHED                              PATIENT -    [facility] INTERNAL  Last Visit: 01-      VIRTUAL      MEDICINE       SHIRA ABDUL                              EP -                              PRIMARY      [facility] INTERNAL  Next Visit: 05-      CARE ([facility])   MEDICINE       SHIRA ABDUL                                                            Last  Test          Frequency    Reason                     Performed    Due Date  --------------------------------------------------------------------------------    Vitamin D...  12 months..  ergocalciferol...........  03- 03-    Health [facility] Embedded Care Due Messages. Reference number: [id number].   3/26/2025 10:16:14 AM CDT

## 2025-03-26 NOTE — TELEPHONE ENCOUNTER
No care due was identified.  Health [facility] Embedded Care Due Messages. Reference number: [id number].   3/26/2025 10:16:42 AM CDT

## 2025-04-02 ENCOUNTER — HOSPITAL ENCOUNTER (OUTPATIENT)
Dept: RADIOLOGY | Facility: HOSPITAL | Age: 55
Discharge: HOME | End: 2025-04-02
Attending: INTERNAL MEDICINE
Payer: COMMERCIAL

## 2025-04-02 DIAGNOSIS — Z12.31 SCREENING MAMMOGRAM FOR BREAST CANCER: ICD-10-CM

## 2025-04-02 PROCEDURE — 77067 SCR MAMMO BI INCL CAD: CPT | Mod: 26,,, | Performed by: RADIOLOGY

## 2025-04-02 PROCEDURE — 77063 BREAST TOMOSYNTHESIS BI: CPT | Mod: 26,,, | Performed by: RADIOLOGY

## 2025-04-02 PROCEDURE — 77063 BREAST TOMOSYNTHESIS BI: CPT | Mod: TC

## 2025-05-15 ENCOUNTER — TELEPHONE (OUTPATIENT)
Dept: INTERNAL MEDICINE | Facility: CLINIC | Age: 55
End: 2025-05-15
Payer: COMMERCIAL

## 2025-05-16 ENCOUNTER — OFFICE VISIT (OUTPATIENT)
Dept: INTERNAL MEDICINE | Facility: CLINIC | Age: 55
End: 2025-05-16
Payer: COMMERCIAL

## 2025-05-16 VITALS
WEIGHT: 259.5 LBS | HEART RATE: 80 BPM | DIASTOLIC BLOOD PRESSURE: 70 MMHG | HEIGHT: 67 IN | OXYGEN SATURATION: 99 % | BODY MASS INDEX: 40.73 KG/M2 | SYSTOLIC BLOOD PRESSURE: 128 MMHG

## 2025-05-16 DIAGNOSIS — I10 ESSENTIAL HYPERTENSION, BENIGN: ICD-10-CM

## 2025-05-16 DIAGNOSIS — I10 ESSENTIAL HYPERTENSION: ICD-10-CM

## 2025-05-16 DIAGNOSIS — E53.8 VITAMIN B12 DEFICIENCY: ICD-10-CM

## 2025-05-16 DIAGNOSIS — M17.0 PRIMARY OSTEOARTHRITIS OF BOTH KNEES: ICD-10-CM

## 2025-05-16 DIAGNOSIS — G47.33 OBSTRUCTIVE SLEEP APNEA: ICD-10-CM

## 2025-05-16 DIAGNOSIS — M25.569 KNEE PAIN, UNSPECIFIED CHRONICITY, UNSPECIFIED LATERALITY: Primary | ICD-10-CM

## 2025-05-16 DIAGNOSIS — E11.9 TYPE 2 DIABETES MELLITUS WITHOUT COMPLICATION, WITHOUT LONG-TERM CURRENT USE OF INSULIN: ICD-10-CM

## 2025-05-16 DIAGNOSIS — E66.01 SEVERE OBESITY: ICD-10-CM

## 2025-05-16 DIAGNOSIS — E55.9 VITAMIN D DEFICIENCY DISEASE: ICD-10-CM

## 2025-05-16 PROCEDURE — 99999 PR PBB SHADOW E&M-EST. PATIENT-LVL V: CPT | Mod: PBBFAC,,, | Performed by: INTERNAL MEDICINE

## 2025-05-16 NOTE — PROGRESS NOTES
"Chief Complaint: Follow up of multiple issues     HPI:THis is a 54 year old woman who presnets for above    Heartburn is better controlled on pantoprazole 40 mg twice daily      She is taking Ozempic 0.5 mg ocne a week - she has lost 6 pounds since our last visit.  Slight nausea. She has diarrhea every other day.  She is off metformin.      Urinary urgency is controlled with scheduled toileting.       She continues to have right knee pain. She has trouble climbing steps.  The right knee keeps her awake at night. She has diclofenac (has not been takign due to stomach)  -She has had 3 injections in her right knee in November without relief of her knee pain.  She takes ibuprofen 800 mg twice daily as needed which helps- does not take often due to stomach issues. .      She has occasional foot pain.  She has the correct shoes with inserts and feet are doing well. She is not taking gabapentin routinely- takes as needed.  She does her exercises at home. She saw Dr Triplett in orthoepdics for her foot pain. She had a MRI on 3/4/21."The MRI of the left hindfoot reveals significant tenosynovitis with fluid around the posterior tibial tendon and some mild tendinosis near the insertion of the posterior tibial tendon".  She was put in a boot and has weaned out of the boot.        She gets fever blisters once a year and valtrex 1000 2 tablets twice daily for only one day which works.  No fever blisters     She has sleep apnea. HEr sleep study 8/1/16 she has severe obstructive sleep apnea. She has a CPAP machine - she has been wearing the CPAP more- 3-4 times a week - wears 6-7 hours at night.  The CPAP machine helps. She is wearing the CPAP machine more      Her blood pressure is controlled on triamterene hct 37.5/25 one tablet daily.  NO chest pain, shortness of breath, paplitations.      She is taking vitamin D 50,000 units once a week     Mood is doing ok. No anxiety or depression      She is working nights at [employer] for [employer].              Past Medical History:   Diagnosis Date    Diabetes mellitus      Hypertension      Obstructive sleep apnea syndrome, severe                        Past Surgical History:   Procedure Laterality Date    COLONOSCOPY N/A 8/22/2018     Procedure: COLONOSCOPY;  Surgeon: Bianca Cooney MD;  Location: [facility];  Service: Endoscopy;  Laterality: N/A;    ENDOMETRIAL ABLATION        ESOPHAGOGASTRODUODENOSCOPY N/A 6/27/2018     Procedure: ESOPHAGOGASTRODUODENOSCOPY (EGD);  Surgeon: Bianca Cooney MD;  Location: [facility] ENDO;  Service: Endoscopy;  Laterality: N/A;    TOTAL ABDOMINAL HYSTERECTOMY W/ BILATERAL SALPINGOOPHORECTOMY   11/12/2015      Social History                   Socioeconomic History    Marital status:        Spouse name: Not on file    Number of children: Not on file    Years of education: Not on file    Highest education level: Not on file   Occupational History    Occupation: security   Social Needs    Financial resource strain: Not hard at all    Food insecurity       Worry: Never true       Inability: Never true    Transportation needs       Medical: No       Non-medical: No   Tobacco Use    Smoking status: Never Smoker    Smokeless tobacco: Never Used   Substance and Sexual Activity    Alcohol use: Yes       Alcohol/week: 1.0 standard drinks       Types: 1 Cans of beer per week       Frequency: Monthly or less       Drinks per session: 1 or 2       Binge frequency: Never       Comment: 1-2 drinks a week    Drug use: No    Sexual activity: Never       Birth control/protection: None   Lifestyle    Physical activity       Days per week: Not on file       Minutes per session: Not on file    Stress: Not on file   Relationships    Social connections       Talks on phone: Once a week       Gets together: Never       Attends Yarsanism service: Never       Active member of club or organization: No       Attends meetings of clubs or organizations: Never        Relationship status: Living with partner   Other Topics Concern    Are you pregnant or think you may be? Not Asked    Breast-feeding Not Asked   Social History Narrative    Not on file                   Family Status   Relation Name Status    Father       Mother   Alive    MGM       MGF       PGM       PGF       Brother   Alive    Neg Hx   (Not Specified)               Meds and allergies: updated on Epic                 Physical exam:          General: alert, oriented x 3, no apparent distress.  Affect normal  HEENT: Conjunctivae: anicteric, PERRL, EOMI, TM clear. Oralpharynx clear  Neck: supple, no thyroid enlargement, no cervical lymphadenopathy  Resp: effort normal, lungs clear bilaterally  CV: Regular rate and rhythm without murmurs, gallops or rubs, no lower extremity edema,       Labs 25 reviewed           Assessment/Plan:      Knee pain - physical therapy. to pain management for  geniculate nerve ablation.  If pain persists, may need to be evaluated for knee replacement. Tylenol arthritis (Acetaminophen) 650 mg 2 tablets twice daily.    GERD - better on pantoprazole 40 mg twice daily    OSA_ try to wear cpap every night.     Left Foot pain and ankle pain -improved  HTN - controlled  Diabetes . Controlled on  Ozempic 0.5 mg weekly.   Morbid obesity - Ozempic 0.5mg once weekly  Vitamin D deficiency - on replacement.   Colonoscopy due 2018 - normal  - father  of colon cancer at age 74.  Colonoscopy 23 - 4 small polyps- hyperplastic - due   EGD normal 18  MMG      Follow up in 5 months, [place] if issues    Visit today included increased complexity associated with the care of the episodic problem  addressed and managing the longitudinal care of the patient due to the serious and/or complex managed problem(s) htn, diabetes, severe obesity, conchita, gerd, knee pain.
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
## 2025-06-13 ENCOUNTER — TELEPHONE (OUTPATIENT)
Dept: PAIN MEDICINE | Facility: CLINIC | Age: 55
End: 2025-06-13
Payer: COMMERCIAL

## 2025-06-13 NOTE — TELEPHONE ENCOUNTER
Staff attempted to contact pt to r/s appointment but pt was unavailable. Staff sent a message to pt and appointment was canceled.

## 2025-07-01 NOTE — PROGRESS NOTES
Outpatient Rehab    Physical Therapy Evaluation    Patient Name: Dina Martínez  MRN: [id number]  YOB: 1970  Encounter Date: 7/2/2025    Therapy Diagnosis:   Encounter Diagnoses   Name Primary?    Difficulty in walking Yes    Decreased strength involving knee joint      Physician: Pamela Arnold MD    Physician Orders: Eval and Treat  Medical Diagnosis: Knee pain, unspecified chronicity, unspecified laterality  Surgical Diagnosis: Not applicable for this Episode   Surgical Date: Not applicable for this Episode  Days Since Last Surgery: Not applicable for this Episode    Visit # / Visits Authorized:  1 / 1  Insurance Authorization Period: 5/16/2025 to 5/16/2026  Date of Evaluation: 7/2/2025  Plan of Care Certification: 7/2/2025 to 9/10/25     Time In: 0800   Time Out: 0845  Total Time (in minutes): 45   Total Billable Time (in minutes): 45    Intake Outcome Measure for FOTO Survey    Therapist reviewed FOTO scores for Dina Martínez on 7/2/2025.   FOTO report - see Media section or FOTO account episode details.     Intake Score: 47 %    Subjective   History of Present Illness  Dina is a 54 y.o. female who reports to physical therapy with a chief concern of R knee pain.         Diagnostic tests related to this condition: None.        History of Present Condition/Illness: Patient reports that she has been having difficulty with her R knee for awhile. She is having more pain to her upper thigh and down the front of her leg as well. Patient reports that she wears a brace when it feels really bad, but it restricts her movement too much. Patient reports that she got a newer brace that is not as bulky, but does still help her overall pain level. Patient reports this has been going on for the last several years and she has been getting cortisone and gel shots with minimal relief. Does not report any buckling or numbness tingling. Patient reports being 60 % of her normal. Patient reports that she went to  the gym recently and had increase in pain afterward.    Pain     Patient reports a current pain level of 7/10. Pain at best is reported as 5/10. Pain at worst is reported as 9/10.   Location: R knee  Clinical Progression (since onset): Worsening  Pain Qualities: Aching, Dull, Pressure, Grinding, Tightness, Sharp  Pain-Relieving Factors: Activity modification, Change in position, Medications - prescription, Relaxation, Rest, Medications - over-the-counter, Ice, Compression  Pain-Aggravating Factors: Squatting, Stair climbing, Standing, Sleeping, Kneeling, Walking, Transfers         Employment  Patient reports: Does the patient's condition impact their ability to work?  Employment Status: Employed full-time   Security      Past Medical History/Physical Systems Review:   Dina Martínez  has a past medical history of Diabetes mellitus, Hypertension, and Obstructive sleep apnea syndrome, severe.    Dina Martínez  has a past surgical history that includes Endometrial ablation; Esophagogastroduodenoscopy (N/A, 6/27/2018); Colonoscopy (N/A, 8/22/2018); Total abdominal hysterectomy w/ bilateral salpingoophorectomy (11/12/2015); Colonoscopy (N/A, 7/6/2023); and Laparoscopic cholecystectomy (N/A, 9/18/2024).    Dina Robbins has a current medication list which includes the following prescription(s): acetaminophen, dicyclomine, ergocalciferol, famotidine, fluticasone propionate, gabapentin, ibuprofen, metformin, multivitamin, ondansetron, ondansetron, pantoprazole, ozempic, tizanidine, triamterene-hydrochlorothiazide 37.5-25 mg, and valacyclovir.    Review of patient's allergies indicates:  No Known Allergies     Objective      Sensation: intact to light touch    DTR: intact to light touch    GAIT DEVIATIONS: Dina Robbins displays decreased step length;decreased weight shift;antalgic gait    Range of Motion:   Knee Left active Left Passive   Flexion 120 135   Extension 0 -2     Knee Right active Right Passive   Flexion 105  110   Extension 0 -2     Lower Extremity Strength   Right LE  Left LE    Knee extension: 4/5 Knee extension: 4+/5   Knee flexion: 4/5 Knee flexion: 4+/5   Hip flexion: 4-/5 Hip flexion: 4/5   Hip extension:  4-/5 Hip extension: 4/5   Hip abduction: 3+/5 Hip abduction: 4/5   Hip adduction: 3+/5 Hip adduction 4-/5   Ankle dorsiflexion: 4/5 Ankle dorsiflexion: 4+/5   Ankle plantarflexion: 4/5 Ankle plantarflexion: 4+/5     Special Tests:   Right Left   Valgus Stress Test Negative Negative   Varus Stress test Negative Negative   Lachman's test Negative Negative   Posterior Drawer Negative Negative   Anterior Drawer Negative Negative   Angeles's Test Positive Negative   Thessaly's Test Negative Negative   Patellar Grind Test Positive Negative     Squat: decreased weight shift to R side, increased R hip ER  Single leg balance R: 3 seconds and needed touch down   Single leg balance L: 5 seconds    Joint Mobility: hypomobile to R knee      Palpation: TTP to L medial joint line    Edema: minimal to R side compared to L side in medial area of joint line    Treatment:  Therapeutic Exercise  TE 1: HEP review: seated heel slides, squat to chair with G TB, and standing heel raises  TE 2: Next visit: LE bike, single leg press, hamstring curl machine, leg EX machine, bridges, standing glut med, tailgaters, DKTC with ball      Time Entry(in minutes):       Assessment & Plan   Assessment  Dina presents with a condition of Low complexity.   Presentation of Symptoms: Stable  Will Comorbidities Impact Care: No       Functional Limitations: Activity tolerance, Ambulating on uneven surfaces, Bed mobility, Completing work/school activities, Decreased ambulation distance/endurance, Disrupted sleep pattern, Functional mobility, Gait limitations, Getting off the floor, Performing household chores, Participating in leisure activities, Painful locomotion/ambulation, Pain with ADLs/IADLs, Range of motion, Sitting tolerance, Squatting, Standing  tolerance, Transfers  Impairments: Weight-bearing intolerance, Impaired physical strength, Lack of appropriate home exercise program, Pain with functional activity, Abnormal or restricted range of motion, Activity intolerance, Abnormal gait, Abnormal muscle firing  Personal Factors Affecting Prognosis: Pain    Patient Goal for Therapy (PT): Be able to walk, do stairs, and feel better  Prognosis: Excellent  Assessment Details: Patient presents to skilled therapy with for chronic R knee pain. Patient's symptoms are consistent with R knee OA related pain exacerbated by decreased ROM as well as decreased Hip/LE functional strength.    Plan  From a physical therapy perspective, the patient would benefit from: Skilled Rehab Services    Planned therapy interventions include: Therapeutic exercise, Therapeutic activities, Neuromuscular re-education, Manual therapy, and Gait training.    Planned modalities to include: Cryotherapy (cold pack), Electrical stimulation - attended, Electrical stimulation - passive/unattended, Thermotherapy (hot pack), and Other (Comment). Dry Needling      Visit Frequency: 1 times Per Week for 10 Weeks.  Other/tapered frequency details: 1-2 times a week    This plan was discussed with Patient.   Discussion participants: Agreed Upon Plan of Care             The patient's spiritual, cultural, and educational needs were considered, and the patient is agreeable to the plan of care and goals.           Goals:   Active       LTG       Patient will improve R knee ROM to 100% with minimal increase in adverse symptoms.       Start:  07/02/25    Expected End:  09/10/25            Patient will improve B LE/hip LE strength by 1 grade.       Start:  07/02/25    Expected End:  09/10/25               STG       Patient will improve pain/adverse symptoms to 5/10 or less with all activities.       Start:  07/02/25    Expected End:  08/06/25                Fer Kaiser, PT, DPT

## 2025-07-02 ENCOUNTER — CLINICAL SUPPORT (OUTPATIENT)
Dept: REHABILITATION | Facility: OTHER | Age: 55
End: 2025-07-02
Payer: COMMERCIAL

## 2025-07-02 DIAGNOSIS — R29.898 DECREASED STRENGTH INVOLVING KNEE JOINT: ICD-10-CM

## 2025-07-02 DIAGNOSIS — R26.2 DIFFICULTY IN WALKING: Primary | ICD-10-CM

## 2025-07-02 PROCEDURE — 97161 PT EVAL LOW COMPLEX 20 MIN: CPT | Performed by: PHYSICAL THERAPIST

## 2025-07-02 PROCEDURE — 97110 THERAPEUTIC EXERCISES: CPT | Performed by: PHYSICAL THERAPIST

## 2025-07-10 ENCOUNTER — TELEPHONE (OUTPATIENT)
Dept: PAIN MEDICINE | Facility: CLINIC | Age: 55
End: 2025-07-10
Payer: COMMERCIAL

## 2025-07-10 NOTE — TELEPHONE ENCOUNTER
Staff contacted the patient to inform them that their scheduled appointment will need to be canceled/rescheduled due to Dr. Dubon being out of the office. The patient was advised that the appointment will need to be rescheduled with a different provider. The patient verbalized understanding, and staff assisted with rescheduling to the next available appointment with

## 2025-07-21 ENCOUNTER — PATIENT MESSAGE (OUTPATIENT)
Dept: ADMINISTRATIVE | Facility: HOSPITAL | Age: 55
End: 2025-07-21
Payer: COMMERCIAL

## 2025-07-24 ENCOUNTER — TELEPHONE (OUTPATIENT)
Dept: PAIN MEDICINE | Facility: CLINIC | Age: 55
End: 2025-07-24
Payer: COMMERCIAL

## 2025-07-24 RX ORDER — ERGOCALCIFEROL 1.25 MG/1
50000 CAPSULE ORAL WEEKLY
Qty: 12 CAPSULE | Refills: 4 | Status: CANCELLED | OUTPATIENT
Start: 2025-07-24

## 2025-07-24 NOTE — TELEPHONE ENCOUNTER
No care due was identified.  [facility] Embedded Care Due Messages. Reference number: [id number].   7/24/2025 2:12:09 PM CDT

## 2025-07-24 NOTE — TELEPHONE ENCOUNTER
Spoke with patient. Confirmed appointment location & time on 07/25/25  with Dr. Granda.  Patient expressed understanding & gratitude. Call ended.

## 2025-07-25 ENCOUNTER — CLINICAL SUPPORT (OUTPATIENT)
Dept: REHABILITATION | Facility: OTHER | Age: 55
End: 2025-07-25
Payer: COMMERCIAL

## 2025-07-25 ENCOUNTER — OFFICE VISIT (OUTPATIENT)
Dept: PAIN MEDICINE | Facility: CLINIC | Age: 55
End: 2025-07-25
Payer: COMMERCIAL

## 2025-07-25 VITALS
TEMPERATURE: 98 F | OXYGEN SATURATION: 100 % | WEIGHT: 263.69 LBS | BODY MASS INDEX: 41.39 KG/M2 | HEIGHT: 67 IN | DIASTOLIC BLOOD PRESSURE: 78 MMHG | HEART RATE: 78 BPM | RESPIRATION RATE: 18 BRPM | SYSTOLIC BLOOD PRESSURE: 115 MMHG

## 2025-07-25 DIAGNOSIS — E66.813 CLASS 3 SEVERE OBESITY WITH BODY MASS INDEX (BMI) OF 40.0 TO 44.9 IN ADULT, UNSPECIFIED OBESITY TYPE, UNSPECIFIED WHETHER SERIOUS COMORBIDITY PRESENT: ICD-10-CM

## 2025-07-25 DIAGNOSIS — R26.2 DIFFICULTY IN WALKING: Primary | ICD-10-CM

## 2025-07-25 DIAGNOSIS — M17.11 OSTEOARTHRITIS OF RIGHT KNEE, UNSPECIFIED OSTEOARTHRITIS TYPE: Primary | ICD-10-CM

## 2025-07-25 DIAGNOSIS — R29.898 DECREASED STRENGTH INVOLVING KNEE JOINT: ICD-10-CM

## 2025-07-25 PROCEDURE — 99999 PR PBB SHADOW E&M-EST. PATIENT-LVL IV: CPT | Mod: PBBFAC,,, | Performed by: ANESTHESIOLOGY

## 2025-07-25 PROCEDURE — 97110 THERAPEUTIC EXERCISES: CPT | Mod: CQ

## 2025-07-25 RX ORDER — ERGOCALCIFEROL 1.25 MG/1
50000 CAPSULE ORAL WEEKLY
Qty: 12 CAPSULE | Refills: 4 | Status: SHIPPED | OUTPATIENT
Start: 2025-07-25

## 2025-07-25 NOTE — PROGRESS NOTES
PCP: Pamela Arnold MD    REFERRING PHYSICIAN: Kina Nelson    CHIEF COMPLAINT: knee pain    Original HISTORY OF PRESENT ILLNESS: Dina Martínez presents to the clinic for the evaluation of the above pain. The pain started two years ago with no inciting event. Patient states that she has seen orthopedic surgery, who has given her both intra-articular steroid injections as well as Euflexxa injections x 3, none of which have provided her relief in the past. Patient has previously completed physical therapy and is currently enrolled in a new round of PT for her knee. She states orthopedic surgery gave her an offloader brace and she alternates between that and another brace she bought online.    Original Pain Description:  The pain is located in the right knee. The pain is described as sharp and throbbing. Exacerbating factors: Walking. Mitigating factors medications. Symptoms interfere with daily activity, sleeping, and work. The patient feels like symptoms have been worsening. Patient denies urinary incontinence, bowel incontinence, and significant weight loss.    Original PAIN SCORES:  Best: Pain is 6  Worst: Pain is 10  Current: Pain is 7        7/25/2025     8:19 AM   Last 3 PDI Scores   Pain Disability Index (PDI) 56       INTERVAL HISTORY: (Newest visit at the bottom)   Interval History (Date):       6 weeks of Conservative therapy:  PT: Currently participating in PT for knee (Must include dates)  Chiro:  HEP:       Treatments / Medications: (Ice/Heat/NSAIDS/APAP/etc):  Aspercreme  Gabapentin  Zanaflex  Ibuprofen      Interventional Pain Procedures: (Previous injections)  Intra-articular steroid injections R knee. Right knee hyaluronic acid injection with ortho    Past Medical History:   Diagnosis Date    Diabetes mellitus     Hypertension     Obstructive sleep apnea syndrome, severe      Past Surgical History:   Procedure Laterality Date    COLONOSCOPY N/A 8/22/2018    Procedure: COLONOSCOPY;   Surgeon: Bianca Cooney MD;  Location: [facility] ENDO;  Service: Endoscopy;  Laterality: N/A;    COLONOSCOPY N/A 7/6/2023    Procedure: COLONOSCOPY;  Surgeon: Ramo Hutson MD;  Location: [facility] ENDO (4TH FLR);  Service: Endoscopy;  Laterality: N/A;  prep instructions given to pt and sent to portal -Jm  Suprep  time frame 4-12 weeks  pt diabetic/uses cpap  6/30 pre-call confirmed; MB    ENDOMETRIAL ABLATION      ESOPHAGOGASTRODUODENOSCOPY N/A 6/27/2018    Procedure: ESOPHAGOGASTRODUODENOSCOPY (EGD);  Surgeon: Bianca Cooney MD;  Location: [facility] ENDO;  Service: Endoscopy;  Laterality: N/A;    LAPAROSCOPIC CHOLECYSTECTOMY N/A 9/18/2024    Procedure: CHOLECYSTECTOMY, LAPAROSCOPIC;  Surgeon: Soumya Cagle MD;  Location: [facility] OR;  Service: Colon and Rectal;  Laterality: N/A;    TOTAL ABDOMINAL HYSTERECTOMY W/ BILATERAL SALPINGOOPHORECTOMY  11/12/2015     Social History[1]  Family History   Problem Relation Name Age of Onset    Hypertension Mother      Colon cancer Father  74    Cirrhosis Brother      Diabetes Neg Hx      Heart disease Neg Hx      Melanoma Neg Hx         Review of patient's allergies indicates:  No Known Allergies    Current Outpatient Medications   Medication Sig    acetaminophen (TYLENOL) 325 MG tablet Take 2 tablets (650 mg total) by mouth every 8 (eight) hours as needed for Temperature greater than or Pain (or equal to 101 degree F).    dicyclomine (BENTYL) 10 MG capsule Take 1 capsule (10 mg total) by mouth 3 times daily before meals as needed (abdominal pain).    ergocalciferol (VITAMIN D2) 50,000 unit Cap Take 1 capsule (50,000 Units total) by mouth once a week.    fluticasone propionate (FLONASE) 50 mcg/actuation nasal spray Instill 2 sprays (100 mcg total) by Each Nostril route daily as needed (nasal congestion).    gabapentin (NEURONTIN) 300 MG capsule Take 1 capsule (300 mg total) by mouth every evening.    ibuprofen (ADVIL,MOTRIN) 800 MG tablet Take 1 tablet (800 mg total) by mouth 3  (three) times daily as needed for Pain.    metFORMIN (GLUCOPHAGE-XR) 500 MG ER 24hr tablet Take 4 tablets (2,000 mg total) by mouth after dinner.    multivitamin capsule Take 1 capsule by mouth once daily.    ondansetron (ZOFRAN-ODT) 4 MG TbDL Dissolve 1 tablet (4 mg total) by mouth every 8 (eight) hours as needed.    ondansetron (ZOFRAN-ODT) 4 MG TbDL dissolve 2 tablets (8 mg total) by mouth every 6 (six) hours as needed (nausea/vomitting).    pantoprazole (PROTONIX) 40 MG tablet Take 1 tablet (40 mg total) by mouth 2 (two) times daily.    semaglutide (OZEMPIC) 0.25 mg or 0.5 mg (2 mg/3 mL) pen injector Inject 0.25 mg once weekly for 4 weeks then 0.5 mg once weekly    tiZANidine (ZANAFLEX) 4 MG tablet Take 1 tablet (4 mg total) by mouth every 8 (eight) hours as needed (muscle spasm.).    triamterene-hydrochlorothiazide 37.5-25 mg (MAXZIDE-25) 37.5-25 mg per tablet Take 1 tablet by mouth once daily.    valACYclovir (VALTREX) 1000 MG tablet Take 2 tablets by mouth twice a day x 1 day for outbreak    famotidine (PEPCID) 40 MG tablet Take 1 tablet (40 mg total) by mouth once daily.     No current facility-administered medications for this visit.       ROS:  GENERAL: No fever. No chills. No fatigue. Denies weight loss. Denies weight gain.  HEENT: Denies headaches. Denies vision change. Denies eye pain. Denies double vision. Denies ear pain.   CV: Denies chest pain.   PULM: Denies of shortness of breath.  GI: Denies constipation. No diarrhea. No abdominal pain. Denies nausea. Denies vomiting. No blood in stool.  HEME: Denies bleeding problems.  : Denies urgency. No painful urination. No blood in urine.  MS: Denies joint stiffness. Denies joint swelling.  Denies back pain.  SKIN: Denies rash.   NEURO: Denies seizures. No weakness.  PSYCH:  Denies difficulty sleeping. No anxiety. Denies depression. No suicidal thoughts.       VITALS:   Vitals:    07/25/25 0817   BP: 115/78   Pulse: 78   Resp: 18   Temp: 98.2 °F (36.8 °C)  "  TempSrc: Oral   SpO2: 100%   Weight: 119.6 kg (263 lb 10.7 oz)   Height: 5' 7" (1.702 m)   PainSc:   8   PainLoc: Knee         PHYSICAL EXAM:   GENERAL: Well appearing, in no acute distress, alert and oriented x3.  PSYCH:  Mood and affect appropriate.  SKIN: Skin color, texture, turgor normal, no rashes or lesions.  HEENT:  Normocephalic, atraumatic. Cranial nerves grossly intact.  PULM: No evidence of respiratory difficulty, symmetric chest rise.  GI:  Non-distended  BACK: Normal range of motion.   EXTREMITIES: No deformities, edema, or skin discoloration.   MUSCULOSKELETAL: Right Knee: Full ROM, stable joint, pain to palpation along medial joint line.   NEURO: Sensation is equal and appropriate bilaterally. Bilateral upper and lower extremity strength is normal and symmetric. Bilateral upper and lower extremity coordination and muscle stretch reflexes are physiologic and symmetric.   GAIT: normal.      LABS:      IMAGING:        ASSESSMENT: 54 y.o. year old female with pain, consistent with:    Encounter Diagnoses   Name Primary?    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. She has been having right knee pain which is worst with walking and activity. Imaging shows significant joint space narrowing in the right medial compartment. Exam shows pain to palpation of the medial joint line. She has been seeing ortho and failed off-, steroid, and gel injections.      PLAN:  Continue Physical Therapy  Encouraged patient to continue to use offloader brace.  Discussed that weight loss would be beneficial  Order for Therapeutic Genicular Nerve block of Right knee  Follow up after DIANA Granda  07/25/2025           [1]   Social " History  Socioeconomic History    Marital status:    Occupational History    Occupation: security   Tobacco Use    Smoking status: Never     Passive exposure: Never    Smokeless tobacco: Never   Substance and Sexual Activity    Alcohol use: Yes     Alcohol/week: 1.0 standard drink of alcohol     Types: 1 Cans of beer per week     Comment: 1-2 drinks a week    Drug use: No    Sexual activity: Never     Birth control/protection: None     Social Drivers of Health     Financial Resource Strain: Low Risk  (4/21/2020)    Overall Financial Resource Strain (CARDIA)     Difficulty of Paying Living Expenses: Not hard at all   Food Insecurity: No Food Insecurity (4/21/2020)    Hunger Vital Sign     Worried About Running Out of Food in the Last Year: Never true     Ran Out of Food in the Last Year: Never true   Transportation Needs: No Transportation Needs (4/21/2020)    PRAPARE - Transportation     Lack of Transportation (Medical): No     Lack of Transportation (Non-Medical): No   Physical Activity: Insufficiently Active (1/22/2025)    Exercise Vital Sign     Days of Exercise per Week: 3 days     Minutes of Exercise per Session: 20 min   Stress: No Stress Concern Present (1/22/2025)    [employer] - Occupational Stress Questionnaire     Feeling of Stress : Only a little

## 2025-07-25 NOTE — PROGRESS NOTES
"  Outpatient Rehab    Physical Therapy Visit    Patient Name: Dina Martínez  MRN: [id number]  YOB: 1970  Encounter Date: 7/25/2025    Therapy Diagnosis:   Encounter Diagnoses   Name Primary?    Difficulty in walking Yes    Decreased strength involving knee joint      Physician: Pamela Arnold MD    Physician Orders: Eval and Treat  Medical Diagnosis: Knee pain, unspecified chronicity, unspecified laterality  Surgical Diagnosis: Not applicable for this Episode   Surgical Date: Not applicable for this Episode  Days Since Last Surgery: Not applicable for this Episode    Visit # / Visits Authorized:  1 / 10  Insurance Authorization Period: 7/2/2025 to 12/31/2025  Date of Evaluation: 7/2/2025  Plan of Care Certification: 7/2/2025 to 9/10/2025      PT/PTA: PTA   Number of PTA visits since last PT visit:1  Time In: 0900   Time Out: 0955  Total Time (in minutes): 55   Total Billable Time (in minutes): 55    FOTO:  Intake Score (%): Not applicable for this Episode  Survey Score 2 (%): Not applicable for this Episode  Survey Score 3 (%): Not applicable for this Episode    Precautions:         Subjective   She feels the same. She has been doing the exercises at home..  Pain reported as 7/10.      Objective            Treatment:  Therapeutic Exercise  TE 1: standing heel raises 2 x 10 x 3"  TE 2: Recumbent bike x 6'  TE 3: Bridges 2 x 12 x 3"  TE 4: DKTC 15 x 5"  TE 5: HS and gastroc stretch 3 x 30"  TE 6: STS from chair with GTB 2 x 10  TE 7: Seated heel slides 2' x 5" hold  TE 8: Recumebent bike x 6'  TE 9: Knee ext 56# 2 x 10: Knee flexion 70# 2 x 10; SL press 80# 2 x 10  TE 10: Standing glut med RTB 2 x 10  Therapeutic Activity  TA 1: Seated tailgaters 5# x 2'  TA 2: 6in step up 2 x 10 ea    Time Entry(in minutes):  Therapeutic Activity Time Entry: 8  Therapeutic Exercise Time Entry: 47    Assessment & Plan   Assessment: Pt presents to first follow up with mod levels of pain. Reviewed HEP with pt able to " perform with mod cues indicating HEP compliance. Introduced additional LE strengthening and ROM with appropriate challenge.   Evaluation/Treatment Tolerance: Patient tolerated treatment well    The patient will continue to benefit from skilled outpatient physical therapy in order to address the deficits listed in the problem list on the initial evaluation, provide patient and family education, and maximize the patients level of independence in the home and community environments.     The patient's spiritual, cultural, and educational needs were considered, and the patient is agreeable to the plan of care and goals.           Plan: Continue POC    Goals:   Active       LTG       Patient will improve R knee ROM to 100% with minimal increase in adverse symptoms.       Start:  07/02/25    Expected End:  09/10/25            Patient will improve B LE/hip LE strength by 1 grade.       Start:  07/02/25    Expected End:  09/10/25               STG       Patient will improve pain/adverse symptoms to 5/10 or less with all activities.       Start:  07/02/25    Expected End:  08/06/25                Iggy Martino PTA

## 2025-07-25 NOTE — TELEPHONE ENCOUNTER
No care due was identified.  [facility] Embedded Care Due Messages. Reference number: [id number].   7/25/2025 10:07:46 AM CDT

## 2025-08-19 ENCOUNTER — HOSPITAL ENCOUNTER (OUTPATIENT)
Facility: OTHER | Age: 55
Discharge: HOME | End: 2025-08-19
Attending: ANESTHESIOLOGY | Admitting: ANESTHESIOLOGY
Payer: COMMERCIAL

## 2025-08-19 VITALS
HEIGHT: 67 IN | OXYGEN SATURATION: 100 % | WEIGHT: 262 LBS | DIASTOLIC BLOOD PRESSURE: 76 MMHG | SYSTOLIC BLOOD PRESSURE: 131 MMHG | TEMPERATURE: 99 F | BODY MASS INDEX: 41.12 KG/M2 | HEART RATE: 73 BPM | RESPIRATION RATE: 18 BRPM

## 2025-08-19 DIAGNOSIS — G89.29 CHRONIC PAIN: ICD-10-CM

## 2025-08-19 DIAGNOSIS — M25.561 CHRONIC PAIN OF RIGHT KNEE: Primary | ICD-10-CM

## 2025-08-19 DIAGNOSIS — G89.29 CHRONIC PAIN OF RIGHT KNEE: Primary | ICD-10-CM

## 2025-08-19 LAB — POCT GLUCOSE: 90 MG/DL (ref 70–110)

## 2025-08-19 PROCEDURE — 20610 DRAIN/INJ JOINT/BURSA W/O US: CPT | Mod: RT,,, | Performed by: ANESTHESIOLOGY

## 2025-08-19 PROCEDURE — 20610 DRAIN/INJ JOINT/BURSA W/O US: CPT | Mod: RT | Performed by: ANESTHESIOLOGY

## 2025-08-19 PROCEDURE — 63600175 PHARM REV CODE 636 W HCPCS: Performed by: ANESTHESIOLOGY

## 2025-08-19 PROCEDURE — 77002 NEEDLE LOCALIZATION BY XRAY: CPT | Mod: 26,,, | Performed by: ANESTHESIOLOGY

## 2025-08-19 RX ORDER — LIDOCAINE HYDROCHLORIDE 20 MG/ML
INJECTION, SOLUTION INFILTRATION; PERINEURAL
Status: DISCONTINUED | OUTPATIENT
Start: 2025-08-19 | End: 2025-08-19 | Stop reason: HOSPADM

## 2025-08-19 RX ORDER — TRIAMCINOLONE ACETONIDE 40 MG/ML
INJECTION, SUSPENSION INTRA-ARTICULAR; INTRAMUSCULAR
Status: DISCONTINUED | OUTPATIENT
Start: 2025-08-19 | End: 2025-08-19 | Stop reason: HOSPADM

## 2025-08-19 RX ORDER — SODIUM CHLORIDE 9 MG/ML
INJECTION, SOLUTION INTRAVENOUS CONTINUOUS
Status: DISCONTINUED | OUTPATIENT
Start: 2025-08-19 | End: 2025-08-19 | Stop reason: HOSPADM

## 2025-08-19 RX ORDER — BUPIVACAINE HYDROCHLORIDE 2.5 MG/ML
INJECTION, SOLUTION EPIDURAL; INFILTRATION; INTRACAUDAL; PERINEURAL
Status: DISCONTINUED | OUTPATIENT
Start: 2025-08-19 | End: 2025-08-19 | Stop reason: HOSPADM

## 2025-08-19 RX ORDER — MIDAZOLAM HYDROCHLORIDE 1 MG/ML
INJECTION INTRAMUSCULAR; INTRAVENOUS
Status: DISCONTINUED | OUTPATIENT
Start: 2025-08-19 | End: 2025-08-19 | Stop reason: HOSPADM

## (undated) DEVICE — IRRIGATOR ENDOSCOPY DISP.

## (undated) DEVICE — SYS SEE SHARP SCP ANTIFG LNG

## (undated) DEVICE — KIT PROCEDURE STER INLET CLOSU

## (undated) DEVICE — APPLIER CLIP EPIX UNIV 5X34

## (undated) DEVICE — GLOVE SENSICARE PI SURG 6

## (undated) DEVICE — APPLICATOR HEMOBLAST LAPSCP

## (undated) DEVICE — SUT ENDOLOOP PDSII 18 LIGA

## (undated) DEVICE — PENCIL ELECTROSURG HOLST W/BLD

## (undated) DEVICE — DRAIN WND 15FRX3/16X4.7MM TRCR

## (undated) DEVICE — ADHESIVE DERMABOND ADVANCED

## (undated) DEVICE — SOL NORMAL USPCA 0.9%

## (undated) DEVICE — TROCAR KII FIOS 11MM X 100MM

## (undated) DEVICE — DRAPE LAPSCP CHOLE 122X102X78

## (undated) DEVICE — NDL INSUFFLATION VERRES 120MM

## (undated) DEVICE — SOL IRR SOD CHL .9% POUR

## (undated) DEVICE — TROCAR KII FIOS 5MM X 100MM

## (undated) DEVICE — KIT WING PAD POSITIONING

## (undated) DEVICE — NDL HYPO REG 25G X 1 1/2

## (undated) DEVICE — BAG TISS RETRV MONARCH 10MM

## (undated) DEVICE — ELECTRODE REM PLYHSV RETURN 9

## (undated) DEVICE — SUT VICRYL 0 27 CT-2

## (undated) DEVICE — BELLOW CANN HEMOBLAST 1.65GR

## (undated) DEVICE — SOL POVIDONE SCRUB IODINE 4 OZ

## (undated) DEVICE — Device

## (undated) DEVICE — GLOVE SENSICARE PI GRN 6.5

## (undated) DEVICE — SYR B-D DISP CONTROL 10CC100/C

## (undated) DEVICE — SUT MCRYL PLUS 4-0 PS2 27IN